# Patient Record
Sex: MALE | Race: WHITE | NOT HISPANIC OR LATINO | Employment: UNEMPLOYED | URBAN - METROPOLITAN AREA
[De-identification: names, ages, dates, MRNs, and addresses within clinical notes are randomized per-mention and may not be internally consistent; named-entity substitution may affect disease eponyms.]

---

## 2019-08-26 PROBLEM — F41.1 GENERALIZED ANXIETY DISORDER: Status: ACTIVE | Noted: 2019-03-19

## 2019-08-26 PROBLEM — F33.2 SEVERE EPISODE OF RECURRENT MAJOR DEPRESSIVE DISORDER, WITHOUT PSYCHOTIC FEATURES: Status: ACTIVE | Noted: 2019-03-19

## 2020-04-03 PROBLEM — F33.0 MILD EPISODE OF RECURRENT MAJOR DEPRESSIVE DISORDER: Status: ACTIVE | Noted: 2020-04-03

## 2022-03-04 PROBLEM — F43.10 POSTTRAUMATIC STRESS DISORDER: Status: ACTIVE | Noted: 2019-03-19

## 2022-03-04 PROBLEM — F32.9 MAJOR DEPRESSIVE DISORDER WITH SINGLE EPISODE: Status: ACTIVE | Noted: 2022-03-04

## 2022-03-04 PROBLEM — F31.9 BIPOLAR DISORDER: Status: ACTIVE | Noted: 2022-03-04

## 2022-03-04 PROBLEM — E66.01 MORBID OBESITY: Status: ACTIVE | Noted: 2022-03-04

## 2022-04-10 ENCOUNTER — HISTORICAL (OUTPATIENT)
Dept: ADMINISTRATIVE | Facility: HOSPITAL | Age: 32
End: 2022-04-10

## 2022-04-27 VITALS
BODY MASS INDEX: 44.1 KG/M2 | WEIGHT: 315 LBS | DIASTOLIC BLOOD PRESSURE: 84 MMHG | SYSTOLIC BLOOD PRESSURE: 169 MMHG | HEIGHT: 71 IN

## 2024-06-03 ENCOUNTER — TELEPHONE (OUTPATIENT)
Dept: FAMILY MEDICINE | Facility: CLINIC | Age: 34
End: 2024-06-03

## 2024-09-27 ENCOUNTER — HOSPITAL ENCOUNTER (INPATIENT)
Facility: HOSPITAL | Age: 34
LOS: 1 days | Discharge: HOME | DRG: 101 | End: 2024-09-28
Attending: EMERGENCY MEDICINE | Admitting: INTERNAL MEDICINE
Payer: MEDICAID

## 2024-09-27 DIAGNOSIS — F31.9 BIPOLAR AFFECTIVE DISORDER, REMISSION STATUS UNSPECIFIED: ICD-10-CM

## 2024-09-27 DIAGNOSIS — R07.9 CHEST PAIN: ICD-10-CM

## 2024-09-27 DIAGNOSIS — F41.9 ANXIETY: ICD-10-CM

## 2024-09-27 DIAGNOSIS — G47.30 SLEEP APNEA, UNSPECIFIED TYPE: ICD-10-CM

## 2024-09-27 DIAGNOSIS — R56.9 NEW ONSET SEIZURE: ICD-10-CM

## 2024-09-27 DIAGNOSIS — G96.89 CNS MASS: Primary | ICD-10-CM

## 2024-09-27 DIAGNOSIS — E66.9 OBESITY, UNSPECIFIED CLASSIFICATION, UNSPECIFIED OBESITY TYPE, UNSPECIFIED WHETHER SERIOUS COMORBIDITY PRESENT: ICD-10-CM

## 2024-09-27 LAB
ABORH RETYPE: NORMAL
ALBUMIN SERPL-MCNC: 3.9 G/DL (ref 3.5–5)
ALBUMIN/GLOB SERPL: 1.2 RATIO (ref 1.1–2)
ALP SERPL-CCNC: 65 UNIT/L (ref 40–150)
ALT SERPL-CCNC: 58 UNIT/L (ref 0–55)
ANION GAP SERPL CALC-SCNC: 8 MEQ/L
APTT PPP: 28.2 SECONDS (ref 23.2–33.7)
AST SERPL-CCNC: 39 UNIT/L (ref 5–34)
BASOPHILS # BLD AUTO: 0.05 X10(3)/MCL
BASOPHILS NFR BLD AUTO: 0.6 %
BILIRUB SERPL-MCNC: 0.4 MG/DL
BUN SERPL-MCNC: 13.1 MG/DL (ref 8.9–20.6)
CALCIUM SERPL-MCNC: 9.1 MG/DL (ref 8.4–10.2)
CHLORIDE SERPL-SCNC: 102 MMOL/L (ref 98–107)
CO2 SERPL-SCNC: 26 MMOL/L (ref 22–29)
CREAT SERPL-MCNC: 1.12 MG/DL (ref 0.73–1.18)
CREAT/UREA NIT SERPL: 12
EOSINOPHIL # BLD AUTO: 0.03 X10(3)/MCL (ref 0–0.9)
EOSINOPHIL NFR BLD AUTO: 0.3 %
ERYTHROCYTE [DISTWIDTH] IN BLOOD BY AUTOMATED COUNT: 12.5 % (ref 11.5–17)
GFR SERPLBLD CREATININE-BSD FMLA CKD-EPI: >60 ML/MIN/1.73/M2
GLOBULIN SER-MCNC: 3.3 GM/DL (ref 2.4–3.5)
GLUCOSE SERPL-MCNC: 164 MG/DL (ref 74–100)
GROUP & RH: NORMAL
HCT VFR BLD AUTO: 48.3 % (ref 42–52)
HGB BLD-MCNC: 16.4 G/DL (ref 14–18)
IMM GRANULOCYTES # BLD AUTO: 0.03 X10(3)/MCL (ref 0–0.04)
IMM GRANULOCYTES NFR BLD AUTO: 0.3 %
INDIRECT COOMBS: NORMAL
INR PPP: 1
LYMPHOCYTES # BLD AUTO: 0.97 X10(3)/MCL (ref 0.6–4.6)
LYMPHOCYTES NFR BLD AUTO: 11.3 %
MAGNESIUM SERPL-MCNC: 2.1 MG/DL (ref 1.6–2.6)
MCH RBC QN AUTO: 32.6 PG (ref 27–31)
MCHC RBC AUTO-ENTMCNC: 34 G/DL (ref 33–36)
MCV RBC AUTO: 96 FL (ref 80–94)
MONOCYTES # BLD AUTO: 0.19 X10(3)/MCL (ref 0.1–1.3)
MONOCYTES NFR BLD AUTO: 2.2 %
NEUTROPHILS # BLD AUTO: 7.32 X10(3)/MCL (ref 2.1–9.2)
NEUTROPHILS NFR BLD AUTO: 85.3 %
NRBC BLD AUTO-RTO: 0 %
PHOSPHATE SERPL-MCNC: 3.4 MG/DL (ref 2.3–4.7)
PLATELET # BLD AUTO: 207 X10(3)/MCL (ref 130–400)
PMV BLD AUTO: 9.1 FL (ref 7.4–10.4)
POTASSIUM SERPL-SCNC: 4.6 MMOL/L (ref 3.5–5.1)
PROT SERPL-MCNC: 7.2 GM/DL (ref 6.4–8.3)
PROTHROMBIN TIME: 12.9 SECONDS (ref 12.5–14.5)
RBC # BLD AUTO: 5.03 X10(6)/MCL (ref 4.7–6.1)
SODIUM SERPL-SCNC: 136 MMOL/L (ref 136–145)
SPECIMEN OUTDATE: NORMAL
WBC # BLD AUTO: 8.59 X10(3)/MCL (ref 4.5–11.5)

## 2024-09-27 PROCEDURE — 99223 1ST HOSP IP/OBS HIGH 75: CPT | Mod: FS,,, | Performed by: NEUROLOGICAL SURGERY

## 2024-09-27 PROCEDURE — 63600175 PHARM REV CODE 636 W HCPCS: Performed by: NURSE PRACTITIONER

## 2024-09-27 PROCEDURE — 86900 BLOOD TYPING SEROLOGIC ABO: CPT | Performed by: EMERGENCY MEDICINE

## 2024-09-27 PROCEDURE — 27000221 HC OXYGEN, UP TO 24 HOURS

## 2024-09-27 PROCEDURE — A9577 INJ MULTIHANCE: HCPCS | Performed by: INTERNAL MEDICINE

## 2024-09-27 PROCEDURE — 86850 RBC ANTIBODY SCREEN: CPT | Performed by: EMERGENCY MEDICINE

## 2024-09-27 PROCEDURE — 99285 EMERGENCY DEPT VISIT HI MDM: CPT | Mod: 25

## 2024-09-27 PROCEDURE — 85025 COMPLETE CBC W/AUTO DIFF WBC: CPT | Performed by: EMERGENCY MEDICINE

## 2024-09-27 PROCEDURE — 25500020 PHARM REV CODE 255: Performed by: INTERNAL MEDICINE

## 2024-09-27 PROCEDURE — 85730 THROMBOPLASTIN TIME PARTIAL: CPT | Performed by: EMERGENCY MEDICINE

## 2024-09-27 PROCEDURE — 80053 COMPREHEN METABOLIC PANEL: CPT | Performed by: EMERGENCY MEDICINE

## 2024-09-27 PROCEDURE — 84100 ASSAY OF PHOSPHORUS: CPT | Performed by: NURSE PRACTITIONER

## 2024-09-27 PROCEDURE — 25000003 PHARM REV CODE 250: Performed by: INTERNAL MEDICINE

## 2024-09-27 PROCEDURE — 85610 PROTHROMBIN TIME: CPT | Performed by: EMERGENCY MEDICINE

## 2024-09-27 PROCEDURE — 83735 ASSAY OF MAGNESIUM: CPT | Performed by: EMERGENCY MEDICINE

## 2024-09-27 PROCEDURE — 86901 BLOOD TYPING SEROLOGIC RH(D): CPT | Performed by: EMERGENCY MEDICINE

## 2024-09-27 PROCEDURE — 11000001 HC ACUTE MED/SURG PRIVATE ROOM

## 2024-09-27 RX ORDER — LABETALOL HYDROCHLORIDE 5 MG/ML
10 INJECTION, SOLUTION INTRAVENOUS EVERY 4 HOURS PRN
Status: DISCONTINUED | OUTPATIENT
Start: 2024-09-27 | End: 2024-09-28 | Stop reason: HOSPADM

## 2024-09-27 RX ORDER — HYDRALAZINE HYDROCHLORIDE 20 MG/ML
10 INJECTION INTRAMUSCULAR; INTRAVENOUS EVERY 4 HOURS PRN
Status: DISCONTINUED | OUTPATIENT
Start: 2024-09-27 | End: 2024-09-28 | Stop reason: HOSPADM

## 2024-09-27 RX ORDER — LISINOPRIL 20 MG/1
20 TABLET ORAL DAILY
Status: DISCONTINUED | OUTPATIENT
Start: 2024-09-28 | End: 2024-09-28 | Stop reason: HOSPADM

## 2024-09-27 RX ORDER — DULOXETIN HYDROCHLORIDE 60 MG/1
60 CAPSULE, DELAYED RELEASE ORAL DAILY
COMMUNITY

## 2024-09-27 RX ORDER — ACETAMINOPHEN 325 MG/1
650 TABLET ORAL EVERY 6 HOURS PRN
Status: DISCONTINUED | OUTPATIENT
Start: 2024-09-27 | End: 2024-09-28 | Stop reason: HOSPADM

## 2024-09-27 RX ORDER — POLYETHYLENE GLYCOL 3350 17 G/17G
17 POWDER, FOR SOLUTION ORAL 2 TIMES DAILY PRN
Status: DISCONTINUED | OUTPATIENT
Start: 2024-09-27 | End: 2024-09-28 | Stop reason: HOSPADM

## 2024-09-27 RX ORDER — HYDROCHLOROTHIAZIDE 12.5 MG/1
12.5 TABLET ORAL DAILY
Status: DISCONTINUED | OUTPATIENT
Start: 2024-09-28 | End: 2024-09-28 | Stop reason: HOSPADM

## 2024-09-27 RX ORDER — DEXAMETHASONE SODIUM PHOSPHATE 4 MG/ML
6 INJECTION, SOLUTION INTRA-ARTICULAR; INTRALESIONAL; INTRAMUSCULAR; INTRAVENOUS; SOFT TISSUE
Status: DISCONTINUED | OUTPATIENT
Start: 2024-09-27 | End: 2024-09-27

## 2024-09-27 RX ORDER — DULOXETIN HYDROCHLORIDE 30 MG/1
60 CAPSULE, DELAYED RELEASE ORAL DAILY
Status: DISCONTINUED | OUTPATIENT
Start: 2024-09-28 | End: 2024-09-28 | Stop reason: HOSPADM

## 2024-09-27 RX ORDER — FAMOTIDINE 20 MG/1
20 TABLET, FILM COATED ORAL 2 TIMES DAILY
Status: DISCONTINUED | OUTPATIENT
Start: 2024-09-27 | End: 2024-09-28 | Stop reason: HOSPADM

## 2024-09-27 RX ORDER — LEVETIRACETAM 15 MG/ML
1500 INJECTION INTRAVASCULAR
Status: DISCONTINUED | OUTPATIENT
Start: 2024-09-27 | End: 2024-09-27

## 2024-09-27 RX ORDER — ONDANSETRON HYDROCHLORIDE 2 MG/ML
4 INJECTION, SOLUTION INTRAVENOUS EVERY 4 HOURS PRN
Status: DISCONTINUED | OUTPATIENT
Start: 2024-09-27 | End: 2024-09-28 | Stop reason: HOSPADM

## 2024-09-27 RX ORDER — PROCHLORPERAZINE EDISYLATE 5 MG/ML
5 INJECTION INTRAMUSCULAR; INTRAVENOUS EVERY 6 HOURS PRN
Status: DISCONTINUED | OUTPATIENT
Start: 2024-09-27 | End: 2024-09-28 | Stop reason: HOSPADM

## 2024-09-27 RX ORDER — SIMETHICONE 80 MG
1 TABLET,CHEWABLE ORAL 4 TIMES DAILY PRN
Status: DISCONTINUED | OUTPATIENT
Start: 2024-09-27 | End: 2024-09-28 | Stop reason: HOSPADM

## 2024-09-27 RX ORDER — CLONAZEPAM 1 MG/1
1 TABLET ORAL 2 TIMES DAILY PRN
COMMUNITY

## 2024-09-27 RX ORDER — LURASIDONE HYDROCHLORIDE 40 MG/1
80 TABLET, FILM COATED ORAL DAILY
Status: DISCONTINUED | OUTPATIENT
Start: 2024-09-28 | End: 2024-09-28 | Stop reason: HOSPADM

## 2024-09-27 RX ORDER — MUPIROCIN 20 MG/G
OINTMENT TOPICAL 2 TIMES DAILY
Status: DISCONTINUED | OUTPATIENT
Start: 2024-09-29 | End: 2024-09-28 | Stop reason: HOSPADM

## 2024-09-27 RX ORDER — DEXAMETHASONE 4 MG/1
4 TABLET ORAL EVERY 8 HOURS
Status: DISCONTINUED | OUTPATIENT
Start: 2024-09-27 | End: 2024-09-28 | Stop reason: HOSPADM

## 2024-09-27 RX ORDER — TRAZODONE HYDROCHLORIDE 100 MG/1
100 TABLET ORAL NIGHTLY
Status: DISCONTINUED | OUTPATIENT
Start: 2024-09-27 | End: 2024-09-28 | Stop reason: HOSPADM

## 2024-09-27 RX ORDER — ALUMINUM HYDROXIDE, MAGNESIUM HYDROXIDE, AND SIMETHICONE 1200; 120; 1200 MG/30ML; MG/30ML; MG/30ML
30 SUSPENSION ORAL 4 TIMES DAILY PRN
Status: DISCONTINUED | OUTPATIENT
Start: 2024-09-27 | End: 2024-09-28 | Stop reason: HOSPADM

## 2024-09-27 RX ORDER — TALC
6 POWDER (GRAM) TOPICAL NIGHTLY PRN
Status: DISCONTINUED | OUTPATIENT
Start: 2024-09-27 | End: 2024-09-28 | Stop reason: HOSPADM

## 2024-09-27 RX ORDER — HYDROXYZINE PAMOATE 50 MG/1
50 CAPSULE ORAL EVERY 8 HOURS PRN
COMMUNITY

## 2024-09-27 RX ORDER — LEVETIRACETAM 500 MG/1
1500 TABLET ORAL 2 TIMES DAILY
Status: DISCONTINUED | OUTPATIENT
Start: 2024-09-27 | End: 2024-09-28 | Stop reason: HOSPADM

## 2024-09-27 RX ORDER — TRAZODONE HYDROCHLORIDE 100 MG/1
100 TABLET ORAL NIGHTLY
COMMUNITY

## 2024-09-27 RX ORDER — FAMOTIDINE 20 MG/1
20 TABLET, FILM COATED ORAL 2 TIMES DAILY
Status: ON HOLD | COMMUNITY
End: 2024-09-28 | Stop reason: HOSPADM

## 2024-09-27 RX ORDER — CLONAZEPAM 1 MG/1
1 TABLET ORAL 2 TIMES DAILY PRN
Status: DISCONTINUED | OUTPATIENT
Start: 2024-09-27 | End: 2024-09-28 | Stop reason: HOSPADM

## 2024-09-27 RX ORDER — LEVETIRACETAM 750 MG/1
1500 TABLET ORAL 2 TIMES DAILY
Qty: 28 TABLET | Refills: 0 | Status: SHIPPED | OUTPATIENT
Start: 2024-09-27 | End: 2024-09-27 | Stop reason: HOSPADM

## 2024-09-27 RX ORDER — BUSPIRONE HYDROCHLORIDE 5 MG/1
15 TABLET ORAL 3 TIMES DAILY
Status: DISCONTINUED | OUTPATIENT
Start: 2024-09-27 | End: 2024-09-28 | Stop reason: HOSPADM

## 2024-09-27 RX ORDER — DEXAMETHASONE 4 MG/1
4 TABLET ORAL EVERY 8 HOURS
Qty: 21 TABLET | Refills: 0 | Status: SHIPPED | OUTPATIENT
Start: 2024-09-27 | End: 2024-09-27 | Stop reason: HOSPADM

## 2024-09-27 RX ORDER — LISINOPRIL AND HYDROCHLOROTHIAZIDE 12.5; 2 MG/1; MG/1
1 TABLET ORAL DAILY
COMMUNITY

## 2024-09-27 RX ORDER — BUSPIRONE HYDROCHLORIDE 15 MG/1
15 TABLET ORAL 3 TIMES DAILY
COMMUNITY

## 2024-09-27 RX ORDER — LEVETIRACETAM 500 MG/1
1500 TABLET ORAL 2 TIMES DAILY
Status: DISCONTINUED | OUTPATIENT
Start: 2024-09-27 | End: 2024-09-27

## 2024-09-27 RX ORDER — NALOXONE HCL 0.4 MG/ML
0.02 VIAL (ML) INJECTION
Status: DISCONTINUED | OUTPATIENT
Start: 2024-09-27 | End: 2024-09-28 | Stop reason: HOSPADM

## 2024-09-27 RX ORDER — LURASIDONE HYDROCHLORIDE 40 MG/1
80 TABLET, FILM COATED ORAL DAILY
COMMUNITY

## 2024-09-27 RX ADMIN — IOHEXOL 100 ML: 350 INJECTION, SOLUTION INTRAVENOUS at 10:09

## 2024-09-27 RX ADMIN — DEXAMETHASONE SODIUM PHOSPHATE 6 MG: 4 INJECTION, SOLUTION INTRA-ARTICULAR; INTRALESIONAL; INTRAMUSCULAR; INTRAVENOUS; SOFT TISSUE at 09:09

## 2024-09-27 RX ADMIN — GADOBENATE DIMEGLUMINE 20 ML: 529 INJECTION, SOLUTION INTRAVENOUS at 06:09

## 2024-09-27 RX ADMIN — FAMOTIDINE 20 MG: 20 TABLET, FILM COATED ORAL at 09:09

## 2024-09-27 RX ADMIN — DEXAMETHASONE SODIUM PHOSPHATE 6 MG: 4 INJECTION, SOLUTION INTRA-ARTICULAR; INTRALESIONAL; INTRAMUSCULAR; INTRAVENOUS; SOFT TISSUE at 04:09

## 2024-09-27 RX ADMIN — LEVETIRACETAM INJECTION 1500 MG: 15 INJECTION INTRAVENOUS at 04:09

## 2024-09-27 RX ADMIN — BUSPIRONE HYDROCHLORIDE 15 MG: 5 TABLET ORAL at 09:09

## 2024-09-27 RX ADMIN — DEXAMETHASONE SODIUM PHOSPHATE 6 MG: 4 INJECTION, SOLUTION INTRA-ARTICULAR; INTRALESIONAL; INTRAMUSCULAR; INTRAVENOUS; SOFT TISSUE at 12:09

## 2024-09-27 NOTE — ED PROVIDER NOTES
Encounter Date: 9/26/2024       History     Chief Complaint   Patient presents with    Transfer     Transfer from [facility] for new onset seizure with brain mass     34 M transferred from [facility] for neurosurgical services.  Patient had new onset seizure activity today and CT scan showed large temporal brain mass.    He has history of bipolar disorder, depression, anxiety, sleep apnea (not compliant with cpap), obesity, hypertension.  States he was never had seizure activity before today around lunchtime he was sitting in his chair with his parents and he was told that he suddenly went stiff became unresponsive then vomited and woke up.  He reports that over the last 1-2 weeks he has been experiencing headaches only whenever she coughs but he states when he coughs he develops a severe splitting headache like his head is about a bust.  States he gets nauseated during those spells.  They last 1-2 minutes and then resolved.  States it was balance has been a little bit off when he walks for last 1-2 days Denies any vision changes denies headache currently no nausea or vomiting currently.  He denies any numbness or weakness in his extremities.  He states it was only previous CT of his brain was when he was a child of approximately 9 years of age because he was having some intermittent numbness weakness in his leg but he reports the CT of the head was normal at that time he was far as he knows            Review of patient's allergies indicates:   Allergen Reactions    Tree nut      Past Medical History:   Diagnosis Date    Bipolar disorder 3/4/2022    Depression     Posttraumatic stress disorder 3/19/2019     No past surgical history on file.  Family History   Problem Relation Name Age of Onset    Mental illness Mother      Mental illness Father      Heart disease Father       Social History     Tobacco Use    Smoking status: Never    Smokeless tobacco: Never     Review of Systems   Constitutional:  Negative for  chills and fever.   Respiratory:  Positive for cough. Negative for shortness of breath.    Cardiovascular:  Negative for chest pain.   Gastrointestinal:  Positive for nausea. Negative for abdominal pain.   Musculoskeletal:  Negative for myalgias.   Neurological:  Positive for headaches. Negative for weakness and numbness.   All other systems reviewed and are negative.      Physical Exam     Initial Vitals [09/27/24 0148]   BP Pulse Resp Temp SpO2   (!) 167/106 80 16 98.6 °F (37 °C) (!) 94 %      MAP       --         Physical Exam    Nursing note and vitals reviewed.  Constitutional: He appears well-developed and well-nourished. No distress.   HENT:   Head: Normocephalic and atraumatic.   Eyes: Conjunctivae and EOM are normal. Pupils are equal, round, and reactive to light.   Cardiovascular:  Normal rate.           Pulmonary/Chest: No respiratory distress. He has no wheezes. He has no rhonchi.   Abdominal: Abdomen is soft. There is no abdominal tenderness. There is no rebound and no guarding.   Musculoskeletal:         General: Normal range of motion.     Neurological: He is alert and oriented to person, place, and time. He has normal strength. No cranial nerve deficit or sensory deficit. GCS score is 15. GCS eye subscore is 4. GCS verbal subscore is 5. GCS motor subscore is 6.   Slight finger-to-nose ataxia bilaterally  Nl strength deltoids, 5/5 f/e elbows, wrists, nl hand . Nl light touch sensation in radial, median and ulner distribution  No leg drift.  Normal flexion-extension of the ankle is normal light touch sensation bilateral lower extremities   Skin: Skin is warm and dry.   Psychiatric: He has a normal mood and affect.         ED Course   Procedures  Labs Reviewed   COMPREHENSIVE METABOLIC PANEL - Abnormal       Result Value    Sodium 136      Potassium 4.6      Chloride 102      CO2 26      Glucose 164 (*)     Blood Urea Nitrogen 13.1      Creatinine 1.12      Calcium 9.1      Protein Total 7.2       Albumin 3.9      Globulin 3.3      Albumin/Globulin Ratio 1.2      Bilirubin Total 0.4      ALP 65      ALT 58 (*)     AST 39 (*)     eGFR >60      Anion Gap 8.0      BUN/Creatinine Ratio 12     CBC WITH DIFFERENTIAL - Abnormal    WBC 8.59      RBC 5.03      Hgb 16.4      Hct 48.3      MCV 96.0 (*)     MCH 32.6 (*)     MCHC 34.0      RDW 12.5      Platelet 207      MPV 9.1      Neut % 85.3      Lymph % 11.3      Mono % 2.2      Eos % 0.3      Basophil % 0.6      Lymph # 0.97      Neut # 7.32      Mono # 0.19      Eos # 0.03      Baso # 0.05      IG# 0.03      IG% 0.3      NRBC% 0.0     APTT - Normal    PTT 28.2     PROTIME-INR - Normal    PT 12.9      INR 1.0     MAGNESIUM - Normal    Magnesium Level 2.10     CBC W/ AUTO DIFFERENTIAL    Narrative:     The following orders were created for panel order CBC auto differential.  Procedure                               Abnormality         Status                     ---------                               -----------         ------                     CBC with Differential[[id number]]       Abnormal            Final result                 Please view results for these tests on the individual orders.   TYPE & SCREEN    Group & Rh A POS      Indirect Norma GEL NEG      Specimen Outdate 09/30/2024 23:59     ABORH RETYPE    ABORH Retype A POS            Imaging Results    None          Medications - No data to display  Medical Decision Making  Amount and/or Complexity of Data Reviewed  Labs: ordered.    Risk  Decision regarding hospitalization.               ED Course as of 09/27/24 0344   Fri Sep 27, 2024   0203 Prior to transfer patient received 1500 mg of Keppra, 12 mg of IV Decadron at 2108 hours lactated Ringer's [LF]   0340 I discussed the case with Dr. Olivo.  Recommends continuing Decadron 6 mg q.8 hours continue Keppra 1500 b.i.d..  He will order the MRIs with stealth protocol in the morning.  Okay to admit to the floor.  I discussed case with Mercedes of our  hospitalist service [facility] place orders with the Decadron and Keppra starting at 0500 hours (previous doses 21:00).  Hospitalist will admit [LF]      ED Course User Index  [LF] Jayme Aragon MD                           Clinical Impression:  Final diagnoses:  [G96.89] CNS mass (Primary)  [R56.9] New onset seizure  [E66.9] Obesity, unspecified classification, unspecified obesity type, unspecified whether serious comorbidity present  [G47.30] Sleep apnea, unspecified type  [F31.9] Bipolar affective disorder, remission status unspecified  [F41.9] Anxiety          ED Disposition Condition    Admit Stable                Jayme Aragon MD  09/27/24 0342       Jayme Aragon MD  09/27/24 0344

## 2024-09-27 NOTE — PLAN OF CARE
Cm met with pt for initial discharge assessment. Pt is single with three children. Pt has no POA or Living Will. Pt had no current identified needs.    09/27/24 0918   Discharge Assessment   Assessment Type Discharge Planning Assessment   Confirmed/corrected address, phone number and insurance Yes   Confirmed Demographics Correct on Facesheet   Source of Information patient   When was your last doctors appointment?   (Dr. René Huertas,[place] and Cristina Travis, Psych)   Communicated SELVIN with patient/caregiver Date not available/Unable to determine   People in Home parent(s)   Do you expect to return to your current living situation? Yes   Do you have help at home or someone to help you manage your care at home? Yes   Who are your caregiver(s) and their phone number(s)? Chrissie Gomes, Mother at [phone]   Prior to hospitilization cognitive status: Unable to Assess   Current cognitive status: Alert/Oriented   Walking or Climbing Stairs Difficulty no   Dressing/Bathing Difficulty no   Home Layout Able to live on 1st floor   Equipment Currently Used at Home none   Readmission within 30 days? No   Patient currently being followed by outpatient case management? No   Do you currently have service(s) that help you manage your care at home? No   Do you take prescription medications? Yes   Do you have prescription coverage? Yes   Coverage Medicaid   Do you have any problems affording any of your prescribed medications? No   Is the patient taking medications as prescribed? yes   Who is going to help you get home at discharge? pending   How do you get to doctors appointments? family or friend will provide   Are you on dialysis? No   Do you take coumadin? No   Discharge Plan A   (TBD)   DME Needed Upon Discharge    (TBD)   Transition of Care Barriers   (no barriers at this time)   Physical Activity   On average, how many days per week do you engage in moderate to strenuous exercise (like a brisk walk)? 0 days   On average,  how many minutes do you engage in exercise at this level? 0 min   Financial Resource Strain   How hard is it for you to pay for the very basics like food, housing, medical care, and heating? Not hard   Housing Stability   In the last 12 months, was there a time when you were not able to pay the mortgage or rent on time? N   At any time in the past 12 months, were you homeless or living in a shelter (including now)? N   Transportation Needs   Has the lack of transportation kept you from medical appointments, meetings, work or from getting things needed for daily living? No   Food Insecurity   Within the past 12 months, you worried that your food would run out before you got the money to buy more. Never true   Within the past 12 months, the food you bought just didn't last and you didn't have money to get more. Never true   Stress   Do you feel stress - tense, restless, nervous, or anxious, or unable to sleep at night because your mind is troubled all the time - these days? To some exte  (No resources needed per pt.)   Social Isolation   How often do you feel lonely or isolated from those around you?  Rarely   Alcohol Use   Q1: How often do you have a drink containing alcohol? 2-3 per wk   Q2: How many drinks containing alcohol do you have on a typical day when you are drinking? 1 or 2   Q3: How often do you have six or more drinks on one occasion? Never   Utilities   In the past 12 months has the electric, gas, oil, or water company threatened to shut off services in your home? No   Health Literacy   How often do you need to have someone help you when you read instructions, pamphlets, or other written material from your doctor or pharmacy? Never   OTHER   Name(s) of People in Home Chrissie Gomes

## 2024-09-27 NOTE — NURSING
Nurses Note -- 4 Eyes      9/27/2024   12:54 PM      Skin assessed during: Admit      [] No Altered Skin Integrity Present    []Prevention Measures Documented      [x] Yes- Altered Skin Integrity Present or Discovered   [] LDA Added if Not in Epic (Describe Wound)   [x] New Altered Skin Integrity was Present on Admit and Documented in LDA   [x] Wound Image Taken    Wound Care Consulted? yes      Attending Nurse:  Helga Ricci RN/Staff Member:   VIDYA Serrano

## 2024-09-27 NOTE — CONSULTS
[facility] - Emergency Dept  Neurosurgery  Consult Note    Inpatient consult to Neurosurgery  Consult performed by: Beatriz Feldman, AGACNP-BC  Consult ordered by: Jayme Aragon MD      Subjective:     Chief Complaint/Reason for Admission:  New onset seizure with brain mass    History of Present Illness:  This is a 34-year-old male transferred from [facility] for Neurological Services patient with new onset seizure activity today and CT demonstrated large temporal brain mass.  Past medical history significant for the following: Bipolar disorder, depression, anxiety, sleep apnea not compliant with CPAP, obesity, hypertension.  Patient has never experienced a seizure until yesterday at lunchtime.  Details surrounding seizure are as follows.  Patient was sitting in a chair with his parents and he was told that he suddenly went stiff and became unresponsive then vomited and then woke up.  Over the last 1-2 weeks he has been experiencing headaches when he coughs.  States he develops splitting headaches and states he feels like his head is about to bust.  Also associated with nausea during the spells.  Also with reports of balance being a little off.  Denies visual changes.    CT head without contrast:  Large heterogeneous mass centered in the right temporal lobe most compatible with primary brain neoplasm with marked associated mass effect as described including right on-call herniation and significant mass effect on the ventricular system.    An MRI with and without contrast of the brain has been ordered as well as Omni stealth protocol.    CT chest abdomen pelvis with and without contrast-with no convincing evidence of malignancy.       On physical exam:  Mr. Gomes is sitting up in the chair with his mother at the bedside.  Patient is awake and oriented and follows commands.  He has no weakness or pronator drift at this time.  His only complaint at this time is slight headache and some  mouth tingling.  He states that he also has this mouth tingling sensation when he smokes marijuana.  Patient's mother states that he has had some cognitive and mental decline over the last few months.  He understands the CT head findings and understands the plan for MRI, steroids, antiseizure medication.      (Not in a hospital admission)      Review of patient's allergies indicates:   Allergen Reactions    Tree nut        Past Medical History:   Diagnosis Date    Bipolar disorder 3/4/2022    Depression     Posttraumatic stress disorder 3/19/2019     No past surgical history on file.  Family History       Problem Relation (Age of Onset)    Heart disease Father    Mental illness Mother, Father          Tobacco Use    Smoking status: Never    Smokeless tobacco: Never   Substance and Sexual Activity    Alcohol use: Not on file    Drug use: Not on file    Sexual activity: Not on file     Review of Systems   Neurological:  Positive for seizures and headaches.        Oral paresthesias  Reports some cognitive impairment over the past few months     Objective:     Weight: (!) 145 kg (319 lb 10.7 oz)  Body mass index is 44.58 kg/m².  Vital Signs (Most Recent):  Temp: 98.6 °F (37 °C) (09/27/24 0148)  Pulse: 104 (09/27/24 0848)  Resp: 12 (09/27/24 0848)  BP: 133/87 (09/27/24 0848)  SpO2: (!) 94 % (09/27/24 0848) Vital Signs (24h Range):  Temp:  [98.6 °F (37 °C)] 98.6 °F (37 °C)  Pulse:  [] 104  Resp:  [12-18] 12  SpO2:  [93 %-97 %] 94 %  BP: (133-174)/() 133/87                              Physical Exam:  Nursing note and vitals reviewed.    Constitutional: He appears well-developed. He is not diaphoretic. No distress.   Robust, overweight     Eyes: Pupils are equal, round, and reactive to light. Conjunctivae and EOM are normal.     Cardiovascular: Normal rate.     Abdominal: Soft. Bowel sounds are normal.     Skin: Skin displays no rash on trunk. Skin displays no lesions on trunk.     Psych/Behavior: He is alert.  He is oriented to person, place, and time. He has a normal mood and affect.     Musculoskeletal:        Right Upper Extremities: Muscle strength is 5/5.        Left Upper Extremities: Muscle strength is 5/5.       Right Lower Extremities: Muscle strength is 5/5.        Left Lower Extremities: Muscle strength is 5/5.     Neurological:        Sensory: There is no sensory deficit in the trunk. There is no sensory deficit in the extremities.        DTRs: He displays no Babinski's sign on the right side. He displays no Babinski's sign on the left side.        Cranial nerves: Cranial nerve(s) II, III, IV, V, VI, VII, VIII, IX, X, XI and XII are intact.   GCS 15   Oriented to all spheres  PERRLA bilateral brisk  Fully oriented to all spheres.    Follows commands   No facial droop, no speech issues.      Moves all extremities with no lateralizing weakness.  Sensation intact throughout.    No gross visual issues.    Cranial nerves grossly intact   No pronator drift     Mouth tingling   Mild headache         Significant Labs:  Recent Labs   Lab 09/27/24 0218      K 4.6      CO2 26   BUN 13.1   CREATININE 1.12   CALCIUM 9.1   MG 2.10     Recent Labs   Lab 09/27/24 0218   WBC 8.59   HGB 16.4   HCT 48.3        Recent Labs   Lab 09/27/24 0218   INR 1.0   APTT 28.2     Microbiology Results (last 7 days)       ** No results found for the last 168 hours. **        PT INR 12.9 and 1.0.  PTT 28.2.  Platelets 207   Current sodium 136         Assessment/Plan:    Acute new onset seizures  Acute large homogeneous right temporal lobe mass suspicious for neoplasm  Headaches  Balance issues  Pressure in head while coughing  Reported cognitive decline over the last few months by mother  Oral paresthesias    Comorbidities include the following:  JANES noncompliant with CPAP, anxiety, depression, bipolar disorder, morbidly obese.  Patient smokes marijuana.  He also binge drinks      MRI of the brain with and without  contrast with Omni stealth protocol as well.    Seizure precautions Keppra   Decadron was initiated   Okay for floor admission with every 4 hour neurological exams   Fall precautions   SCDs   Hospitalist following-consider CIWA protocol      Chest CT already obtained   EKG ordered  Coags performed and are within normal limits  Type and cross has been performed         There are no hospital problems to display for this patient.      Thank you for your consult. I will follow-up with patient. Please contact us if you have any additional questions.    JAS StaplesNIKHIL-BC  Neurosurgery  [facility] - Emergency Dept

## 2024-09-27 NOTE — H&P
[facility] Hospital Medicine History & Physical Examination       Patient Name: Luis Gomes  MRN: [id number]  Patient Class: IP- Inpatient   Admission Date: 9/27/2024   Admitting Physician: [facility]   Length of Stay: 0  Attending Physician: Dr Miguel Smith  Primary Care Provider: Zarina Melvin FNP-C  Face-to-Face encounter date: 09/27/2024  Code Status: Full code  Chief Complaint: Transfer (Transfer from [facility] for new onset seizure with brain mass)        Screening for Social Drivers for health:  Patient screened for food insecurity, housing instability, transportation needs, utility difficulties, and interpersonal safety (select all that apply as identified as concern)  []Housing or Food  []Transportation Needs  []Utility Difficulties  []Interpersonal safety  [x]None    Patient information was obtained from patient, patient's family, past medical records and/or ER records.     HISTORY OF PRESENT ILLNESS:   Luis Gomes is a 34 y.o. male who PMH includes bipolar disorder, anxiety, depression, PTSD, JANES non complaint with CPAP; presented to the ED at [facility] on 09/26/2024  with new onset seizure activity, and altered mental status.  Patient reports no prior history of seizure activity.  Workup at [facility] CT head without contrast demonstrated large homogeneous mass center in the right temporal lobe.  No neurosurgery Services available at [facility].  Patient was transferred to [facility] on 9/27/2024 for neurosurgery Services. Patient reports he was [facility] had [facility]  Reports he was eating lunch sitting in his chair with his parents and he was told that he suddenly went stiff became unresponsive then vomited and woke up.  He reports that over the last 1-2 weeks he has been experiencing headaches with cough; He reports when he coughs he develops a severe splitting headache like his head is about a  magaly.  States he gets nauseated during those spells.  They last 1-2 minutes and then resolved.  Patient does endorse feeling off balance over the past 2 days while ambulating.  He denies any injury trauma or falls. Denies any vision changes denies headache currently no nausea or vomiting currently.  He denies any numbness or weakness in his extremities.  He states it was only previous CT of his brain was when he was a child of approximately 9 years of age because he was having some intermittent numbness weakness in his leg but he reports the CT of the head was normal at that time he was far as he knows.  He denies any prior episode of seizures.  No reported chest pain, fever, chills, or any sick contacts.  Lab work reviewed demonstrated AST 39, ALT 58; glucose 164, other indices unremarkable.  Initial vital signs /106 pulse 80 respirations 16 temperature 98.6° F O2 saturation 94% on room air.  Patient's blood pressure subsequently trended down, O2 saturation improved.  Neurosurgery Services were consulted and ED provider spoke with Neurosurgery Services and received recommendations.  Patient is admitted to hospital medicine services for further management.      PAST MEDICAL HISTORY:   Hold disorder   Anxiety   Depression  PTSD   JANES noncompliant with CPAP   Obesity    PAST SURGICAL HISTORY:   Denies    ALLERGIES:   Tree nut    FAMILY HISTORY:   Reviewed and negative    SOCIAL HISTORY:   No reports of alcohol, tobacco, or illicit drug use  Lives with family     HOME MEDICATIONS:   As documented  Prior to Admission medications    Medication Sig Start Date End Date Taking? Authorizing Provider   busPIRone (BUSPAR) 15 MG tablet Take 15 mg by mouth 3 (three) times daily.   Yes Provider, Historical   clonazePAM (KLONOPIN) 1 MG tablet Take 1 mg by mouth 2 (two) times daily as needed for Anxiety.   Yes Provider, Historical   DULoxetine (CYMBALTA) 60 MG capsule Take 60 mg by mouth once daily.   Yes Provider,  Historical   lisinopriL-hydrochlorothiazide (PRINZIDE,ZESTORETIC) 20-12.5 mg per tablet Take 1 tablet by mouth once daily.   Yes Provider, Historical   traZODone (DESYREL) 100 MG tablet Take 100 mg by mouth every evening.   Yes Provider, Historical   famotidine (PEPCID) 20 MG tablet Take 20 mg by mouth 2 (two) times daily.    Provider, Historical   hydrOXYzine pamoate (VISTARIL) 50 MG Cap Take 50 mg by mouth 3 (three) times daily. Take one to two capsules by mouth three times daily as needed for anxiety.    Provider, Historical   lurasidone (LATUDA) 40 mg Tab tablet Take 80 mg by mouth once daily.    Provider, Historical   ALPRAZolam (XANAX) 0.25 MG tablet Take 1 tablet (0.25 mg total) by mouth daily as needed for Anxiety. 5/23/22 9/27/24  Zarina Melvin, KARENP-C   busPIRone (BUSPAR) 15 MG tablet Take 1 tablet (15 mg total) by mouth 2 (two) times daily. 5/13/22 9/27/24  Zarina Melvin, KARENP-C   FLUoxetine 40 MG capsule Take 1 capsule (40 mg total) by mouth once daily. 5/13/22 9/27/24  Zarina Melvin, KARENP-C   ondansetron (ZOFRAN-ODT) 8 MG TbDL Take 1 tablet (8 mg total) by mouth every 8 (eight) hours as needed (nausea). 5/23/22 9/27/24  Zarina Melvin FNP-C   propranoloL (INDERAL) 20 MG tablet Take 1 tablet (20 mg total) by mouth 2 (two) times daily. 5/13/22 9/27/24  Zarina Melvin, KARENP-C       REVIEW OF SYSTEMS:   Except as documented, all other systems reviewed and negative     PHYSICAL EXAM:     VITAL SIGNS: 24 HRS MIN & MAX LAST   Temp  Min: 98.6 °F (37 °C)  Max: 98.6 °F (37 °C) 98.6 °F (37 °C)   BP  Min: 134/75  Max: 174/90 (!) 174/90   Pulse  Min: 80  Max: 101  101   Resp  Min: 14  Max: 17 17   SpO2  Min: 93 %  Max: 97 % 96 %       General appearance: Well-developed, well-nourished male in no apparent distress.  HENT: Atraumatic head. Moist mucous membranes of oral cavity.  Eyes: PERRL  Neck: Supple.   Lungs: Clear to auscultation bilaterally. No wheezing present.   Heart: Regular rate and rhythm. S1  and S2 present with no murmurs/gallop/rub. No pedal edema. No JVD present.   Abdomen: Soft, non-distended, non-tender. No rebound tenderness/guarding. Bowel sounds are normal.   Extremities: No cyanosis, clubbing, or edema.  Skin: No Rash.   Neuro:   Neuro  CN 2, 3, 4, 6: EOMI, nl visual fields, nl gross vision test, finger to nose ataxia bilaterally  CN 5: nl chewing, nl facial sensation  CN 7: nl smile, nl brow wrinkle, nl eyes closure  CN 8: nl hearing  CN 9, 10: nl uvular elevation on phonation  CN 11: nl shoulder shrug  CN 12: nl tongue protrusion, midline, nl lateral deviation   Strength: nl strength proximally and distally, upper and lower extremity  Sensation: nl to light touch proximally and distally, upper and lower extremity  Speech: clear, no dysarthria  Thinking: clear, goal oriented, no delusions  Psych/mental status: Appropriate mood and affect. Responds appropriately to questions.     LABS AND IMAGING:     Recent Labs   Lab 09/27/24  0218   WBC 8.59   RBC 5.03   HGB 16.4   HCT 48.3   MCV 96.0*   MCH 32.6*   MCHC 34.0   RDW 12.5      MPV 9.1       Recent Labs   Lab 09/27/24  0218      K 4.6      CO2 26   BUN 13.1   CREATININE 1.12   CALCIUM 9.1   MG 2.10   ALBUMIN 3.9   ALKPHOS 65   ALT 58*   AST 39*   BILITOT 0.4       Microbiology Results (last 7 days)       ** No results found for the last 168 hours. 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      VTE Prophylaxis: SCD for DVT prophylaxis and will be advised to be as mobile as possible and sit in a chair as tolerated    Patient condition:  Stable    __________________________________________________________________________  INPATIENT LIST OF MEDICATIONS     Scheduled Meds:   dexAMETHasone  6 mg Intravenous Q8H    levETIRAcetam (Keppra) IV (PEDS and ADULTS)  1,500 mg Intravenous Q12H    [START ON 9/29/2024] mupirocin   Nasal BID     Continuous Infusions:  PRN Meds:.  Current Facility-Administered Medications:     acetaminophen, 650 mg, Oral, Q6H PRN    aluminum-magnesium hydroxide-simethicone, 30 mL, Oral, QID PRN    melatonin, 6 mg, Oral, Nightly PRN    naloxone, 0.02 mg, Intravenous, PRN    ondansetron, 4 mg, Intravenous, Q4H PRN    polyethylene glycol, 17 g, Oral, BID PRN    prochlorperazine, 5 mg, Intravenous, Q6H PRN    simethicone, 1 tablet, Oral, QID PRN      I, MICHAEL Graham have reviewed and discussed the case with   Dr. Miguel Smith. Please see the following addendum for further assessment and plan from their attending MD.  MICHAEL Singh   09/27/2024    Dr. Smith 09/27/2024-chart reviewed patient examined.  Patient is a 34-year-old white male that had a sudden onset of seizure activity at home in front of parents.  Patient was taken to [facility] where he was transferred to [facility] after findings of a right temporal brain mass.  Patient was seen at emergency room [facility] and admitted to hospitalist services.  Patient has been seen by Neurosurgery.  CT chest abdomen and pelvis was done showing no signs of masses or acute findings.  This was followed by an MRI brain with and without contrast with findings of a right temporal lobe  numerous enhancing linear and nodular lesions which extend medially along midbrain margin and there additional enhancing lesions bilateral cerebellar peduncles.  Also heterogeneity and some enhancement of the right aspect of the midbrain.  There is around this substantial brain edema, mass effect and midline shift.  Findings are concerning to be neoplasm and could be metastatic.  There is no acute brain infarct.  Patient has been getting Keppra as well as steroids for his edema.  Again he was seen by neuro surgery with plans for re-evaluation and possibly discharge tomorrow to follow with Dr. Serna with Neurosurgery.  Patient admitted to room 845 2 nurses present at the time of my evaluation.  I was made aware willing unit that patient has decided to leave against medical advice.  Patient is alert/active and oriented.  Voices some vague complaints at the present moment concerning headaches.  There is no gross motor deficits.  He knows his name/location that he is at the present moment/he knows his address and is oriented today/month/year.  He refers that he wants to go to his house to see his family.  I explained findings on CT abdomen chest and pelvis as well as MRI.  Also explained that he has substantial cerebral edema with numerous bilateral masses.  I also make him to stay so we can continue his steroids/antiepileptics and that way we can make plans for him to follow-up tomorrow with Neurosurgery as outpatient.  The patient apologized but refers that he has made his decision and he wants to go home.  At that moment I offered him AMA papers which he signs with date.  Patient left SANGEETA Smith MD  09/27/2024   9:42 p.m..          Discharge Planning and Disposition: No mobility needs. Ambulating well. Good social support system.   Anticipated discharge    All diagnosis and differential diagnosis have been reviewed; assessment and plan has been documented; I have personally reviewed the labs and test results  that are presently available; I have reviewed the patients medication list; I have reviewed the consulting providers response and recommendations. I have reviewed or attempted to review medical records based upon their availability.    All of the patient and family questions have been addressed and answered. Patient's is agreeable to the above stated plan. I will continue to monitor closely and make adjustments to medical management as needed.

## 2024-09-27 NOTE — NURSING
Admission documentation completed by the admit nurse. Home med rec complete. Pt did elect for meds to bed with [facility]. 4 Eyes and standing weight to be completed by the admitting floor nurse.

## 2024-09-27 NOTE — PLAN OF CARE
Problem: Adult Inpatient Plan of Care  Goal: Plan of Care Review  Outcome: Progressing  Goal: Patient-Specific Goal (Individualized)  Outcome: Progressing  Goal: Absence of Hospital-Acquired Illness or Injury  Outcome: Progressing  Intervention: Identify and Manage Fall Risk  Flowsheets (Taken 9/27/2024 1721)  Safety Promotion/Fall Prevention:   assistive device/personal item within reach   in recliner, wheels locked   instructed to call staff for mobility   medications reviewed   side rails raised x 2  Goal: Optimal Comfort and Wellbeing  Outcome: Progressing  Goal: Readiness for Transition of Care  Outcome: Progressing     Problem: Bariatric Environmental Safety  Goal: Safety Maintained with Care  Outcome: Progressing     Problem: Wound  Goal: Optimal Coping  Outcome: Progressing  Goal: Optimal Functional Ability  Outcome: Progressing  Goal: Absence of Infection Signs and Symptoms  Outcome: Progressing  Goal: Improved Oral Intake  Outcome: Progressing  Goal: Optimal Pain Control and Function  Outcome: Progressing  Intervention: Prevent or Manage Pain  Flowsheets (Taken 9/27/2024 1721)  Pain Management Interventions: quiet environment facilitated  Goal: Skin Health and Integrity  Outcome: Progressing  Goal: Optimal Wound Healing  Outcome: Progressing

## 2024-09-28 VITALS
HEIGHT: 71 IN | RESPIRATION RATE: 20 BRPM | DIASTOLIC BLOOD PRESSURE: 78 MMHG | WEIGHT: 315 LBS | TEMPERATURE: 98 F | BODY MASS INDEX: 44.1 KG/M2 | OXYGEN SATURATION: 94 % | SYSTOLIC BLOOD PRESSURE: 141 MMHG | HEART RATE: 100 BPM

## 2024-09-28 LAB
ALBUMIN SERPL-MCNC: 4.1 G/DL (ref 3.5–5)
ALBUMIN/GLOB SERPL: 1.1 RATIO (ref 1.1–2)
ALP SERPL-CCNC: 66 UNIT/L (ref 40–150)
ALT SERPL-CCNC: 58 UNIT/L (ref 0–55)
ANION GAP SERPL CALC-SCNC: 13 MEQ/L
AST SERPL-CCNC: 31 UNIT/L (ref 5–34)
BASOPHILS # BLD AUTO: 0.02 X10(3)/MCL
BASOPHILS NFR BLD AUTO: 0.1 %
BILIRUB SERPL-MCNC: 0.4 MG/DL
BUN SERPL-MCNC: 18.6 MG/DL (ref 8.9–20.6)
CALCIUM SERPL-MCNC: 9.8 MG/DL (ref 8.4–10.2)
CHLORIDE SERPL-SCNC: 103 MMOL/L (ref 98–107)
CO2 SERPL-SCNC: 27 MMOL/L (ref 22–29)
CREAT SERPL-MCNC: 1.08 MG/DL (ref 0.73–1.18)
CREAT/UREA NIT SERPL: 17
EOSINOPHIL # BLD AUTO: 0 X10(3)/MCL (ref 0–0.9)
EOSINOPHIL NFR BLD AUTO: 0 %
ERYTHROCYTE [DISTWIDTH] IN BLOOD BY AUTOMATED COUNT: 12.4 % (ref 11.5–17)
GFR SERPLBLD CREATININE-BSD FMLA CKD-EPI: >60 ML/MIN/1.73/M2
GLOBULIN SER-MCNC: 3.6 GM/DL (ref 2.4–3.5)
GLUCOSE SERPL-MCNC: 176 MG/DL (ref 74–100)
HCT VFR BLD AUTO: 50.1 % (ref 42–52)
HGB BLD-MCNC: 16.9 G/DL (ref 14–18)
IMM GRANULOCYTES # BLD AUTO: 0.1 X10(3)/MCL (ref 0–0.04)
IMM GRANULOCYTES NFR BLD AUTO: 0.7 %
LYMPHOCYTES # BLD AUTO: 1.08 X10(3)/MCL (ref 0.6–4.6)
LYMPHOCYTES NFR BLD AUTO: 7.5 %
MAGNESIUM SERPL-MCNC: 2.3 MG/DL (ref 1.6–2.6)
MCH RBC QN AUTO: 32.4 PG (ref 27–31)
MCHC RBC AUTO-ENTMCNC: 33.7 G/DL (ref 33–36)
MCV RBC AUTO: 96.2 FL (ref 80–94)
MONOCYTES # BLD AUTO: 0.47 X10(3)/MCL (ref 0.1–1.3)
MONOCYTES NFR BLD AUTO: 3.3 %
NEUTROPHILS # BLD AUTO: 12.71 X10(3)/MCL (ref 2.1–9.2)
NEUTROPHILS NFR BLD AUTO: 88.4 %
NRBC BLD AUTO-RTO: 0 %
PLATELET # BLD AUTO: 285 X10(3)/MCL (ref 130–400)
PMV BLD AUTO: 9.9 FL (ref 7.4–10.4)
POCT GLUCOSE: 292 MG/DL (ref 70–110)
POTASSIUM SERPL-SCNC: 4.5 MMOL/L (ref 3.5–5.1)
PROT SERPL-MCNC: 7.7 GM/DL (ref 6.4–8.3)
RBC # BLD AUTO: 5.21 X10(6)/MCL (ref 4.7–6.1)
SODIUM SERPL-SCNC: 143 MMOL/L (ref 136–145)
WBC # BLD AUTO: 14.38 X10(3)/MCL (ref 4.5–11.5)

## 2024-09-28 PROCEDURE — 83735 ASSAY OF MAGNESIUM: CPT | Performed by: INTERNAL MEDICINE

## 2024-09-28 PROCEDURE — 99233 SBSQ HOSP IP/OBS HIGH 50: CPT | Mod: ,,, | Performed by: NEUROLOGICAL SURGERY

## 2024-09-28 PROCEDURE — 63600175 PHARM REV CODE 636 W HCPCS: Performed by: INTERNAL MEDICINE

## 2024-09-28 PROCEDURE — 99900035 HC TECH TIME PER 15 MIN (STAT)

## 2024-09-28 PROCEDURE — S4991 NICOTINE PATCH NONLEGEND: HCPCS | Performed by: INTERNAL MEDICINE

## 2024-09-28 PROCEDURE — 80053 COMPREHEN METABOLIC PANEL: CPT | Performed by: INTERNAL MEDICINE

## 2024-09-28 PROCEDURE — 85025 COMPLETE CBC W/AUTO DIFF WBC: CPT | Performed by: INTERNAL MEDICINE

## 2024-09-28 PROCEDURE — 36415 COLL VENOUS BLD VENIPUNCTURE: CPT | Performed by: INTERNAL MEDICINE

## 2024-09-28 PROCEDURE — 27000221 HC OXYGEN, UP TO 24 HOURS

## 2024-09-28 PROCEDURE — 25000003 PHARM REV CODE 250: Performed by: INTERNAL MEDICINE

## 2024-09-28 RX ORDER — DEXAMETHASONE 4 MG/1
4 TABLET ORAL 2 TIMES DAILY
Qty: 60 TABLET | Refills: 0 | Status: SHIPPED | OUTPATIENT
Start: 2024-09-28 | End: 2024-10-28

## 2024-09-28 RX ORDER — IBUPROFEN 200 MG
1 TABLET ORAL DAILY
Status: DISCONTINUED | OUTPATIENT
Start: 2024-09-28 | End: 2024-09-28 | Stop reason: HOSPADM

## 2024-09-28 RX ORDER — LEVETIRACETAM 1000 MG/1
1000 TABLET ORAL 2 TIMES DAILY
Qty: 60 TABLET | Refills: 0 | Status: SHIPPED | OUTPATIENT
Start: 2024-09-28 | End: 2024-10-28

## 2024-09-28 RX ORDER — PANTOPRAZOLE SODIUM 40 MG/1
40 TABLET, DELAYED RELEASE ORAL DAILY
Qty: 30 TABLET | Refills: 0 | Status: SHIPPED | OUTPATIENT
Start: 2024-09-28 | End: 2024-10-28

## 2024-09-28 RX ADMIN — DULOXETINE HYDROCHLORIDE 60 MG: 30 CAPSULE, DELAYED RELEASE ORAL at 08:09

## 2024-09-28 RX ADMIN — LISINOPRIL 20 MG: 20 TABLET ORAL at 08:09

## 2024-09-28 RX ADMIN — LEVETIRACETAM 1500 MG: 500 TABLET, FILM COATED ORAL at 12:09

## 2024-09-28 RX ADMIN — BUSPIRONE HYDROCHLORIDE 15 MG: 5 TABLET ORAL at 04:09

## 2024-09-28 RX ADMIN — CLONAZEPAM 1 MG: 1 TABLET ORAL at 04:09

## 2024-09-28 RX ADMIN — DEXAMETHASONE 4 MG: 4 TABLET ORAL at 06:09

## 2024-09-28 RX ADMIN — TRAZODONE HYDROCHLORIDE 100 MG: 100 TABLET ORAL at 12:09

## 2024-09-28 RX ADMIN — LURASIDONE HYDROCHLORIDE 80 MG: 40 TABLET, FILM COATED ORAL at 08:09

## 2024-09-28 RX ADMIN — LEVETIRACETAM 1500 MG: 500 TABLET, FILM COATED ORAL at 08:09

## 2024-09-28 RX ADMIN — CLONAZEPAM 1 MG: 1 TABLET ORAL at 03:09

## 2024-09-28 RX ADMIN — FAMOTIDINE 20 MG: 20 TABLET, FILM COATED ORAL at 08:09

## 2024-09-28 RX ADMIN — HYDROCHLOROTHIAZIDE 12.5 MG: 12.5 TABLET ORAL at 08:09

## 2024-09-28 RX ADMIN — NICOTINE 1 PATCH: 21 PATCH, EXTENDED RELEASE TRANSDERMAL at 08:09

## 2024-09-28 RX ADMIN — DEXAMETHASONE 4 MG: 4 TABLET ORAL at 04:09

## 2024-09-28 RX ADMIN — BUSPIRONE HYDROCHLORIDE 15 MG: 5 TABLET ORAL at 08:09

## 2024-09-28 RX ADMIN — DEXAMETHASONE 4 MG: 4 TABLET ORAL at 12:09

## 2024-09-28 NOTE — NURSING
Patient calls out requesting to leave. Patient educated on condition and necessity of staying in the hospital for treatment for his health and safety. Patient states he understands but really needs to get home as he misses his kids and feels like he's needed there more. AAO x 4. Provider to be notified.

## 2024-09-28 NOTE — NURSING
Patient left floor to smoke after being educated on necessity of staying on unit for close monitoring. KATY Conway went to ED to assist patient back to room. Patient re-educated that this is a smoke-free facility and leaving the unit to smoke could result in falls or injury as patient is on seizure and fall precautions - patient verbalized understanding. Patient oriented to bed alarm and agreed to it being placed on at this time, verbalized understanding of fall and seizure precautions and necessity.

## 2024-09-28 NOTE — NURSING
Patient requesting to rescind Man RUTHERFORD CN is second nurse verfiying patient's decision to stay. Patient educated on  safety measures, bed low/locked, siderails in use x 3 (seizure precautions), fall band, bed alarm, and non-skid socks - verbalized understanding.

## 2024-09-28 NOTE — NURSING
"Spoke to Dr. ROGER Jackson and  Vanesa Chamberlain NP about patient signing AMA paperwork and now agreeing to stay. Dr. Jackson states, "Okay for patient to stay and to continue Decadron, Keppra, and Trazadone as previously ordered."   "

## 2024-09-28 NOTE — NURSING
Dr. Smith on floor at this time, spoke to him about patient's wish to leave AMA. 2126: Provider seeing patient at the bedside at this time, explains risks of leaving AMA to patient. Patient verbalized understanding but adamant about leaving AMA. Patient, provider, and Shabbir sign AMA paperwork. Patient looking for a ride home.

## 2024-09-28 NOTE — DISCHARGE SUMMARY
[facility] Hospital Medicine Discharge Summary    Admit Date: 9/27/2024  Discharge Date and Time: 9/28/20245:17 PM  Admitting Physician:  Team  Discharging Physician: Miguel Smith MD.  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 male who PMH includes bipolar disorder, anxiety, depression, PTSD, JANES non complaint with CPAP; presented to the ED at [facility] on 09/26/2024  with new onset seizure activity, and altered mental status.  Patient reports no prior history of seizure activity.  Workup at [facility] CT head without contrast demonstrated large homogeneous mass center in the right temporal lobe.  No neurosurgery Services available at [facility].  Patient was transferred to [facility] on 9/27/2024 for neurosurgery Services. Patient reports he was never had seizure activity before presenting to the [facility] facility.  Reports he was eating lunch sitting in his chair with his parents and he was told that he suddenly went stiff became unresponsive then vomited and woke up.  He reports that over the last 1-2 weeks he has been experiencing headaches with cough; He reports when he coughs he develops a severe splitting headache like his head is about a bust.  States he gets nauseated during those spells.  They last 1-2 minutes and then resolved.  Patient does endorse feeling off balance over the past 2 days while ambulating.  He denies any injury trauma or falls. Denies any vision changes denies headache currently no nausea or vomiting currently.  He denies any numbness or weakness in his extremities.  He states it was only previous CT of his  brain was when he was a child of approximately 9 years of age because he was having some intermittent numbness weakness in his leg but he reports the CT of the head was normal at that time he was far as he knows.  He denies any prior episode of seizures.  No reported chest pain, fever, chills, or any sick contacts.  Lab work reviewed demonstrated AST 39, ALT 58; glucose 164, other indices unremarkable.  Initial vital signs /106 pulse 80 respirations 16 temperature 98.6° F O2 saturation 94% on room air.  Patient's blood pressure subsequently trended down, O2 saturation improved.  Neurosurgery Services were consulted and ED provider spoke with Neurosurgery Services and received recommendations.  Patient is admitted to hospital medicine services for further management    At the time of my evaluation pt referred that he wanted to leave hospital. I explained  findings  on imaging as well as consequences of leaving AMA. PT fully oriented and signed AMA just to end up staying.  Pt was continued on steroids iv as well as keppra. He was seen by dr sanchez next day with much improvement of headaches. Pt was cleared for discharge home on keppra 1 G po bi as well as dexamethasone 4 mgs po bid . Pt to follow up with his [address] [place] [address] physician  Monday and dr linares with [facility] in 2 weeks .   Pt was seen and examined on the day of discharge  Vitals:  VITAL SIGNS: 24 HRS MIN & MAX LAST   Temp  Min: 97.4 °F (36.3 °C)  Max: 98.6 °F (37 °C) 98.2 °F (36.8 °C)   BP  Min: 126/80  Max: 158/83 (!) 141/78   Pulse  Min: 92  Max: 109  100   Resp  Min: 20  Max: 22 20   SpO2  Min: 92 %  Max: 98 % (!) 94 %       Physical Exam:  General appearance: Well-developed, well-nourished male in no apparent distress.  HENT: Atraumatic head. Moist mucous membranes of oral cavity.  Eyes: PERRL  Neck: Supple.   Lungs: Clear to auscultation bilaterally. No wheezing present.   Heart: Regular rate and rhythm. S1 and S2 present with no murmurs/gallop/rub. No  pedal edema. No JVD present.   Abdomen: Soft, non-distended, non-tender. No rebound tenderness/guarding. Bowel sounds are normal.   Extremities: No cyanosis, clubbing, or edema.  Skin: No Rash.   Neuro:   Neuro  CN 2, 3, 4, 6: EOMI, nl visual fields, nl gross vision test, finger to nose ataxia bilaterally  CN 5: nl chewing, nl facial sensation  CN 7: nl smile, nl brow wrinkle, nl eyes closure  CN 8: nl hearing  CN 9, 10: nl uvular elevation on phonation  CN 11: nl shoulder shrug  CN 12: nl tongue protrusion, midline, nl lateral deviation   Strength: nl strength proximally and distally, upper and lower extremity  Sensation: nl to light touch proximally and distally, upper and lower extremity  Speech: clear, no dysarthria  Thinking: clear, goal oriented, no delusions  Psych/mental status: Appropriate mood and affect. Responds appropriately to questions.        Recent Labs   Lab 09/27/24 0218 09/28/24 0347   WBC 8.59 14.38*   RBC 5.03 5.21   HGB 16.4 16.9   HCT 48.3 50.1   MCV 96.0* 96.2*   MCH 32.6* 32.4*   MCHC 34.0 33.7   RDW 12.5 12.4    285   MPV 9.1 9.9       Recent Labs   Lab 09/27/24 0218 09/28/24  0347    143   K 4.6 4.5    103   CO2 26 27   BUN 13.1 18.6   CREATININE 1.12 1.08   CALCIUM 9.1 9.8   MG 2.10 2.30   ALBUMIN 3.9 4.1   ALKPHOS 65 66   ALT 58* 58*   AST 39* 31   BILITOT 0.4 0.4        Microbiology Results (last 7 days)       ** No results found for the last 168 hours. **             MRI Brain [facility] Stealth W/WO Contrast  Narrative: EXAMINATION:  MRI brain OMNI stealth within weight of without contrast.    CLINICAL HISTORY:  Right temporoparietal mass  Impression: This exam was performed according to the surgical planning purpose.  There is no charge rendered for this exam.    Electronically signed by: Juan Hernández  Date:    09/27/2024  Time:    19:10  MRI Brain W WO Contrast  Narrative: EXAMINATION:  MRI BRAIN W WO CONTRAST    CLINICAL HISTORY:  Brain/CNS neoplasm,  staging;    TECHNIQUE:  Multiplanar MRI sequences were performed of the brain without in following administration gadolinium based contrast.    COMPARISON:  Outside facility CT brain without contrast September 26, 2024    FINDINGS:  The left cerebellar peduncle is remarkable for heterogeneous enhancing lesion measuring 1.8 x 1.2 cm on image 50 series 18 with surrounding edematous changes.  This slightly causes compressive effect upon the 4th ventricle without significant narrowing.  There are also right cerebellar peduncle smaller enhancing lesions on image 71 series 18 which again are surrounded by brain edematous changes.  There is substantial heterogeneity of the right aspect of midbrain with subtle heterogeneous enhancement.  There are numerous heterogeneous linear and nodular enhancements of the right temporal lobe.  There are minimal associated dephasing artifacts on the gradient echo sequence reflective of old hemorrhagic byproducts.  There is also separate enhancing focus of the right basal ganglia on image 125 series 18.  These findings are surrounded by substantial edema which result in effacement of the sulci and there is compressive effect upon the right lateral ventricle.  Right to left midline shift is 6-7 mm.  The left lateral ventricle is mildly dilated.    There are periventricular T2 FLAIR hyperintensities which likely represent transependymal flow of the cerebrospinal fluid.  There is no diffusion weighted restriction or altered signal on the ADC map to reflect an acute infarct.  No cerebral convexities fluid collection.  Impression: 1.  Right temporal lobe numerous enhancing linear and nodular lesions which extend medially along midbrain margin and there are additional enhancing lesions bilateral cerebellar peduncles.  Also heterogeneity and some enhancement of the right aspect of midbrain.  Surrounding substantial brain edema, mass effect and midline shift.  Findings are concerning to be  neoplasm and could be metastatic.    2.  No acute brain infarct.    .    Electronically signed by: Juan Hernández  Date:    09/27/2024  Time:    19:08  US Abdomen Limited  Narrative: EXAMINATION:  US ABDOMEN LIMITED    CLINICAL HISTORY:  elevated liver enzymes;    COMPARISON:  CT earlier today.    FINDINGS:  Grayscale, color and spectral doppler evaluation of the right upper quadrant.    The pancreas is obscured.  Imaged portion of the IVC is normal in caliber.    The liver is mildly enlarged and there is heterogeneous increased hepatic parenchymal echogenicity.  Normal hepatopetal flow is noted in the portal vein.    The gallbladder is not visualized.  The common bile duct is not visualized.    Right kidney measures 13.5 cm in length. No hydronephrosis.  Impression: 1. Hepatomegaly with steatosis.  2. Gallbladder and common bile duct not visualized.  No significant abnormality of the gallbladder or biliary tree evident on same day CT.    Electronically signed by: Sha Duran  Date:    09/27/2024  Time:    11:11  CT Chest Abdomen Pelvis With IV Contrast (XPD) NO Oral Contrast  Narrative: EXAMINATION:  CT CHEST ABDOMEN PELVIS WITH IV CONTRAST (XPD)    CLINICAL HISTORY:  Metastatic disease evaluation;    TECHNIQUE:  Helical acquisition from the thoracic inlet through the ischia with  IV contrast. Three plane reconstructions made available for review. DLP 1345 mGycm. Automatic exposure control, adjustment of mA/kV or iterative reconstruction technique was used to reduce radiation.    COMPARISON:  None available.    FINDINGS:  Chest.    Heart size is within normal limits.  No pericardial effusion.    No mediastinal, hilar or axillary adenopathy by CT size criteria.  There is gynecomastia.    There is no pleural effusion.  There is no suspicious pulmonary nodule.  Mild dependent atelectasis.    Abdomen and pelvis.    The liver is mildly enlarged and there is hepatic steatosis.  Patent portal vein.  No significant  abnormality of the gallbladder, spleen, pancreas or adrenals.    No hydronephrosis.  There is a nonobstructing calculus right kidney.    No bowel obstruction.  No suspicious bowel wall thickening.  No mesenteric adenopathy.    Urinary bladder unremarkable. No pelvic free fluid. Abdominal aorta normal in caliber.  No retroperitoneal or inguinal adenopathy.    There is no suspicious osseous lesion.  Mild degenerative change of the spine.  Impression: No convincing CT evidence of malignancy chest, abdomen or pelvis.    Electronically signed by: Sha Duran  Date:    09/27/2024  Time:    10:28         Medication List        CONTINUE taking these medications      busPIRone 15 MG tablet  Commonly known as: BUSPAR     clonazePAM 1 MG tablet  Commonly known as: KlonoPIN     DULoxetine 60 MG capsule  Commonly known as: CYMBALTA     famotidine 20 MG tablet  Commonly known as: PEPCID     hydrOXYzine pamoate 50 MG Cap  Commonly known as: VISTARIL     LATUDA 40 mg Tab tablet  Generic drug: lurasidone     lisinopriL-hydrochlorothiazide 20-12.5 mg per tablet  Commonly known as: PRINZIDE,ZESTORETIC     traZODone 100 MG tablet  Commonly known as: DESYREL               Explained in detail to the patient about the discharge plan, medications, and follow-up visits. Pt understands and agrees with the treatment plan  Discharge Disposition:   home   Discharged Condition: stable  Diet- cardiac   Dietary Orders (From admission, onward)       Start     Ordered    09/27/24 1307  Diet Adult Regular  Diet effective now         09/27/24 1306                   Medications Per DC med rec  Activities as tolerated   Follow-up Information       Zarina Melvin, FNP-C Follow up.    Specialty: Family Medicine  Contact information:  [facility] [address] [phone]               Cole Smith MD. Schedule an appointment as soon as possible for a visit in 2 week(s).    Specialty:  Neurosurgery  Contact information:  [address] [place] [address] [address] [place] [address]  [phone]                           For further questions contact hospitalist office    Discharge time 33 minutes    For worsening symptoms, chest pain, shortness of breath, increased abdominal pain, high grade fever, stroke or stroke like symptoms, immediately go to the nearest Emergency Room or call 911 as soon as possible.      Miguel Morejon M.D, on 9/28/2024. at 5:17 PM.

## 2024-09-28 NOTE — NURSING
Nurses Note -- 4 Eyes      9/27/2024   1845 PM      Skin assessed during: Q Shift Change      [] No Altered Skin Integrity Present    []Prevention Measures Documented      [x] Yes- Altered Skin Integrity Present or Discovered   [] LDA Added if Not in Epic (Describe Wound)   [x] New Altered Skin Integrity was Present on Admit and Documented in LDA   [x] Wound Image Taken    Wound Care Consulted? Yes    Attending Nurse:  VIDYA Orantes    Second RN/Staff Member:  TERE Partida

## 2024-09-28 NOTE — PROGRESS NOTES
[facility] Acute  Wound Care    Patient Name:  Luis Gomes   MRN:  [id number]  Date: 9/28/2024  Diagnosis: CNS mass    History:     Past Medical History:   Diagnosis Date    Bipolar disorder 3/4/2022    Depression     Posttraumatic stress disorder 3/19/2019       Social History     Socioeconomic History    Marital status: Single   Tobacco Use    Smoking status: Never    Smokeless tobacco: Never     Social Drivers of Health     Financial Resource Strain: Low Risk  (9/27/2024)    Overall Financial Resource Strain (CARDIA)     Difficulty of Paying Living Expenses: Not hard at all   Food Insecurity: No Food Insecurity (9/27/2024)    Hunger Vital Sign     Worried About Running Out of Food in the Last Year: Never true     Ran Out of Food in the Last Year: Never true   Transportation Needs: No Transportation Needs (9/27/2024)    TRANSPORTATION NEEDS     Transportation : No   Physical Activity: Inactive (9/27/2024)    Exercise Vital Sign     Days of Exercise per Week: 0 days     Minutes of Exercise per Session: 0 min   Stress: Stress Concern Present (9/27/2024)    [employer] [place] [employer] - Occupational Stress Questionnaire     Feeling of Stress : To some extent   Housing Stability: Low Risk  (9/27/2024)    Housing Stability Vital Sign     Unable to Pay for Housing in the Last Year: No     Homeless in the Last Year: No       Precautions:     Allergies as of 09/26/2024    (No Known Allergies)       WO Assessment Details/Treatment      09/28/24 1128   WOCN Assessment   Visit Date 09/28/24   Visit Time 1128   Consult Type New   WOCN Speciality Wound   Intervention chart review;assessed;applied;orders   Teaching on-going        Wound 09/27/24 1230 Other (comment) lower Sacral spine #1   Date First Assessed/Time First Assessed: 09/27/24 1230   Present on Original Admission: Yes  Primary Wound Type: (c) Other (comment)  Orientation: lower  Location: Sacral spine  Wound Number: #1  Is  this injury device related?: No   Wound Image     Dressing Appearance Open to air   Drainage Amount Small   Drainage Characteristics/Odor Sanguineous   Appearance Red;Moist;Bleeding   Tissue loss description Full thickness   Black (%), Wound Tissue Color 0 %   Red (%), Wound Tissue Color 100 %   Yellow (%), Wound Tissue Color 0 %   Periwound Area Intact;Dry   Wound Length (cm) 1.1 cm   Wound Width (cm) 0.5 cm   Wound Depth (cm) 0.4 cm   Wound Volume (cm^3) 0.22 cm^3   Wound Surface Area (cm^2) 0.55 cm^2   Care Cleansed with:;Soap and water   Dressing Applied;Foam     WOCN consulted for sacrum. Discussed plan of care with nurse Partida prior to visiting the patient. Treatment recommendation in place. Sacrum: Cleanse with soap and water, dry well. Apply foam, change daily and PRN. Educated the patient on the importance of maintaining good hygiene regimen and he verbalized understanding. Patient verbalized wanting to have his mother roll him downstairs so he could smoke, told patient I wound notify nurse but that was not my decision to make. Nursing to continue with treatment recommendations and other preventative measures. Patient does mobilize self. No further questions. Will follow up.   09/28/2024

## 2024-09-28 NOTE — PROGRESS NOTES
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  He is awake, alert and oriented in all 4 spheres.  His speech is fluent.    Plan is for discharge home today on Decadron 4mg bid and Keppra 1000mg bid with plans to follow up with Dr. Serna in 2 weeks his blood sugar was 176 this morning and that we will need to be watched now that he is on steroids.

## 2024-09-28 NOTE — DISCHARGE INSTR - SUPPLEMENTARY INSTRUCTIONS
Follow up with your primary care physician this coming week . You are on steroids that will increase your blood sugars/make you thirsty and urinate  more .

## 2024-09-28 NOTE — NURSING
Patient feeling restless, anxious and agitated, requesting to go smoke, messaged KOLBY Chamberlain NP regarding patient and requested nicotine patch.

## 2024-09-28 NOTE — NURSING
Reached out to Vanesa Chamberlain NP on call for hospitalist regarding patient wanting to rescind his AMA. Awaiting provider response.

## 2024-09-30 ENCOUNTER — TELEPHONE (OUTPATIENT)
Dept: NEUROSURGERY | Facility: CLINIC | Age: 34
End: 2024-09-30
Payer: MEDICAID

## 2024-10-02 NOTE — TELEPHONE ENCOUNTER
I called and spoke to patient  to schedule hospital follow up per Dr. Serna patient is to be scheduled on 10/14 no imaging. I scheduled patient on 10/14 with  at 10:20 patient verbalized understanding and was complaint with date and time of appointment.

## 2024-10-07 ENCOUNTER — TELEPHONE (OUTPATIENT)
Dept: NEUROSURGERY | Facility: CLINIC | Age: 34
End: 2024-10-07

## 2024-10-07 ENCOUNTER — TELEPHONE (OUTPATIENT)
Dept: NEUROSURGERY | Facility: HOSPITAL | Age: 34
End: 2024-10-07
Payer: MEDICAID

## 2024-10-07 NOTE — TELEPHONE ENCOUNTER
I spoke to patient over the phone. Patient stated he had been experiencing one seizure per day over the past 3 days. He would zone out for a few minutes and not recall what happened. He currently is taking Keppra 1000mg BID. He does not have a neurology provider. He also complained of trouble swallowing and aspirating whenever he eats in the past few days. I advised him to go to the ED if he is having seizure. The ED can increase his Keppra or switch him over to a different anti-seizure medication. His seizure needs to be controlled. Patient verbalized understanding. Dr. Serna and I will see him in clinic on Monday, October 14, 2024.

## 2024-10-08 ENCOUNTER — ANESTHESIA EVENT (OUTPATIENT)
Dept: SURGERY | Facility: HOSPITAL | Age: 34
End: 2024-10-08
Payer: MEDICAID

## 2024-10-09 ENCOUNTER — TELEPHONE (OUTPATIENT)
Dept: NEUROSURGERY | Facility: CLINIC | Age: 34
End: 2024-10-09
Payer: MEDICAID

## 2024-10-09 DIAGNOSIS — M48.062 LUMBAR STENOSIS WITH NEUROGENIC CLAUDICATION: Primary | ICD-10-CM

## 2024-10-09 NOTE — TELEPHONE ENCOUNTER
I spoke to the patient over the phone to see if he had gone to the ED after I advised him on 10/7/2024 to go to the ED for his seizures. Patient stated he had a lot of things going on his mind. He had someone staying with him and watching over him so he felt safe and didn't feel the need to go to the hospital. He had another seizure episode last night. He is compliant and taking his keppra 1000mg BID. I advised him to increase his keppra from 1000mg to 1500mg BID. If he has another seizure he should go to the ED. I told the patient that I will try to get in contact with a neurologist here in [place] and see if they could see him within the next few days for his seizures. Patient agreed to this plan. He verbalized understanding.

## 2024-10-10 NOTE — PROGRESS NOTES
[facility]  History & Physical  Neurosurgery      Luis Gomes   [id number]   1990       SUBJECTIVE:     Chief Complaint:  Preoperative visit    History of Present Illness:   Luis Gomes is a 34 y.o. left handed male with a past medical history of bipolar disorder, depression, anxiety, sleep apnea (noncompliant with CPAP), obesity, and hypertension.  Patient came to all [facility] as a transfer from [facility] after patient had new onset of seizure activity.  Patient was seen his chair with his parents when suddenly patient went stiff and became unresponsive and had an emesis episode and then woke up.  For the past 1-2 weeks, he had been experiencing headaches and nausea when he coughed.  He also reported unstable balance.  Patient's mother reported he had some cognitive and mental decline over the past few months.    Patient is accompanied by his mother and father at today's visit. He states his pain is 0/10 on a pain scale today. He states when he cough, strain, or lean over, he would experience pressure inside his head, ringing in both ears, and dizziness. He complains of blurry vision in both eyes. Sometimes, he has numbness in the right temporal region by his right eye. He also has paresthesia of the lips and inside the cheeks of his mouth. He complains of intermittent slurred speech. When he experiences a petite mal seizure, he would zone off and not make any sense.  He is unstable on his feet and has multiple falls.  His last fall was this morning and patient denies hitting his head or losing consciousness. He is incontinent every other day when he wakes up in the morning. He is ready to move forward with surgery.       Past Medical History:   Diagnosis Date    Anxiety     Bipolar disorder 03/04/2022    Brain mass     Depression     Fatty liver     High blood sugar     Insomnia     Posttraumatic stress disorder 03/19/2019    Seizures     Sleep apnea         No past surgical history  on file.     Social History     Tobacco Use    Smoking status: Every Day     Current packs/day: 0.50     Types: Cigarettes    Smokeless tobacco: Never   Substance Use Topics    Alcohol use: Yes     Comment: beer occ        Family History   Problem Relation Name Age of Onset    Mental illness Mother      Mental illness Father      Heart disease Father          Review of patient's allergies indicates:   Allergen Reactions    Tree nut         Current Outpatient Medications   Medication Instructions    busPIRone (BUSPAR) 15 mg, 3 times daily    clonazePAM (KLONOPIN) 1 mg, 2 times daily PRN    dexAMETHasone (DECADRON) 4 mg, Oral, 2 times daily    dicyclomine (BENTYL) 20 mg, Every 6 hours PRN    DULoxetine (CYMBALTA) 60 mg, Daily    DULoxetine (CYMBALTA) 30 mg    hydrOXYzine pamoate (VISTARIL) 50 mg, Every 8 hours PRN    hydrOXYzine pamoate (VISTARIL) 25-50 mg    ibuprofen (ADVIL,MOTRIN) 800 mg, 3 times daily    levETIRAcetam (KEPPRA) 1,000 mg, Oral, 2 times daily    lisinopriL-hydrochlorothiazide (PRINZIDE,ZESTORETIC) 20-12.5 mg per tablet 1 tablet, Daily    lurasidone (LATUDA) 80 mg, Daily    ondansetron (ZOFRAN-ODT) 4 mg, Every 6 hours PRN    pantoprazole (PROTONIX) 40 mg, Oral, Daily    traZODone (DESYREL) 100 mg, Nightly PRN    TRUE METRIX GLUCOSE METER Misc USE TO check glucose BEFORE meals AND AT BEDTIME    TRUE METRIX GLUCOSE TEST STRIP Strp 4 times daily    TRUEPLUS LANCETS 30 gauge Misc USE TO check glucose BEFORE meals AND AT BEDTIME          Review of Systems   Constitutional: Negative.    HENT:          Ringing in ears   Eyes:  Positive for blurred vision.   Respiratory: Negative.     Cardiovascular: Negative.    Gastrointestinal: Negative.    Genitourinary:         Incontinent of voiding    Musculoskeletal:  Positive for falls.   Skin: Negative.    Neurological:  Positive for dizziness, tingling, speech change and seizures.        Paresthesia of right temporal region by the right eye, lips, and inside the  cheeks of his mouth.   Slurred speech   Endo/Heme/Allergies: Negative.    Psychiatric/Behavioral:  Positive for depression. The patient is nervous/anxious and has insomnia.        OBJECTIVE:       There were no vitals taken for this visit.         Physical Exam    General:  Pleasant, Well-nourished, Well-groomed.    Head:  Normocephalic without any obvious abnormality.     Cardiovascular:  Heart has regular rate and rhythm.  No bruits in bilateral carotids.     Lungs:  Breathing is quiet, non-lablored  Auscultate clear bilateral lungs.    Neurological:  Oriented to person, place, time, and situation.  Appropriate quality, quantity and organization of sentences.  Memory, cognition, and affect are appropriate.  Pupils are equal, round, and reactive to light  Extraocular movements are intact.  Visual field intact in all 4 quadrants.   Facial symmetric.   Tongue midline, facial sensation equal bilaterally.   Speech is clear and coherent.   Gait is unsteady.         ASSESSMENT:       ICD-10-CM ICD-9-CM   1. Brain mass  G93.89 348.89        PLAN:     Luis Gomes returns today for his preoperative visit. His symptoms continue to worsen. He is ready to move forward with surgery. He is scheduled for a right temporal craniotomy for tumor excision on 10/15/2024 with Dr. Serna. The risks and benefits were explained to the patient in a language he seems to understand including bleeding, infection, need for additional procedures, no change in current status, progressive decline, injury to major vessels, stroke, hemorrhage, loss of function, coma, injury to spinal nerve roots causing temporary or permanent paralysis. No assurance was given as squeale that the patient is suffering from at this time. Once patient voiced understanding consents were signed and surgery was scheduled.       Follow up at 2 week post-op on 10/30/2024 at 0900am.         Azul Del Toro, [facility]  Neurosurgery  [facility]    Disclaimer:  This  note is prepared using voice recognition software and as such is likely to have errors despite attempts at proofreading. Please contact me for questions.

## 2024-10-14 ENCOUNTER — LAB VISIT (OUTPATIENT)
Dept: LAB | Facility: HOSPITAL | Age: 34
End: 2024-10-14
Payer: MEDICAID

## 2024-10-14 ENCOUNTER — OFFICE VISIT (OUTPATIENT)
Dept: NEUROSURGERY | Facility: CLINIC | Age: 34
End: 2024-10-14
Payer: MEDICAID

## 2024-10-14 ENCOUNTER — PATIENT MESSAGE (OUTPATIENT)
Dept: SURGERY | Facility: HOSPITAL | Age: 34
End: 2024-10-14
Payer: MEDICAID

## 2024-10-14 VITALS
DIASTOLIC BLOOD PRESSURE: 86 MMHG | WEIGHT: 315 LBS | HEIGHT: 71 IN | SYSTOLIC BLOOD PRESSURE: 134 MMHG | BODY MASS INDEX: 44.1 KG/M2 | RESPIRATION RATE: 18 BRPM | HEART RATE: 78 BPM

## 2024-10-14 DIAGNOSIS — G93.89 BRAIN MASS: ICD-10-CM

## 2024-10-14 DIAGNOSIS — I10 HYPERTENSION, UNSPECIFIED TYPE: ICD-10-CM

## 2024-10-14 DIAGNOSIS — G93.89 BRAIN MASS: Primary | ICD-10-CM

## 2024-10-14 DIAGNOSIS — Z01.818 PREOPERATIVE TESTING: ICD-10-CM

## 2024-10-14 DIAGNOSIS — Z01.818 PREOPERATIVE TESTING: Primary | ICD-10-CM

## 2024-10-14 DIAGNOSIS — K21.9 GASTROESOPHAGEAL REFLUX DISEASE, UNSPECIFIED WHETHER ESOPHAGITIS PRESENT: ICD-10-CM

## 2024-10-14 LAB
BACTERIA #/AREA URNS AUTO: ABNORMAL /HPF
BILIRUB UR QL STRIP.AUTO: NEGATIVE
CLARITY UR: CLEAR
COLOR UR AUTO: ABNORMAL
GLUCOSE UR QL STRIP: ABNORMAL
HGB UR QL STRIP: NEGATIVE
KETONES UR QL STRIP: NEGATIVE
LEUKOCYTE ESTERASE UR QL STRIP: NEGATIVE
NITRITE UR QL STRIP: NEGATIVE
OHS QRS DURATION: 82 MS
OHS QTC CALCULATION: 431 MS
PH UR STRIP: 6 [PH]
PROT UR QL STRIP: ABNORMAL
RBC #/AREA URNS AUTO: ABNORMAL /HPF
SP GR UR STRIP.AUTO: 1.03 (ref 1–1.03)
SQUAMOUS #/AREA URNS LPF: ABNORMAL /HPF
UROBILINOGEN UR STRIP-ACNC: 4
WBC #/AREA URNS AUTO: ABNORMAL /HPF

## 2024-10-14 PROCEDURE — 93005 ELECTROCARDIOGRAM TRACING: CPT

## 2024-10-14 PROCEDURE — 3079F DIAST BP 80-89 MM HG: CPT | Mod: CPTII,,, | Performed by: STUDENT IN AN ORGANIZED HEALTH CARE EDUCATION/TRAINING PROGRAM

## 2024-10-14 PROCEDURE — 1159F MED LIST DOCD IN RCRD: CPT | Mod: CPTII,,, | Performed by: STUDENT IN AN ORGANIZED HEALTH CARE EDUCATION/TRAINING PROGRAM

## 2024-10-14 PROCEDURE — 3075F SYST BP GE 130 - 139MM HG: CPT | Mod: CPTII,,, | Performed by: STUDENT IN AN ORGANIZED HEALTH CARE EDUCATION/TRAINING PROGRAM

## 2024-10-14 PROCEDURE — 81001 URINALYSIS AUTO W/SCOPE: CPT

## 2024-10-14 PROCEDURE — 93010 ELECTROCARDIOGRAM REPORT: CPT | Mod: ,,, | Performed by: STUDENT IN AN ORGANIZED HEALTH CARE EDUCATION/TRAINING PROGRAM

## 2024-10-14 PROCEDURE — 4010F ACE/ARB THERAPY RXD/TAKEN: CPT | Mod: CPTII,,, | Performed by: STUDENT IN AN ORGANIZED HEALTH CARE EDUCATION/TRAINING PROGRAM

## 2024-10-14 PROCEDURE — 3008F BODY MASS INDEX DOCD: CPT | Mod: CPTII,,, | Performed by: STUDENT IN AN ORGANIZED HEALTH CARE EDUCATION/TRAINING PROGRAM

## 2024-10-14 PROCEDURE — 99024 POSTOP FOLLOW-UP VISIT: CPT | Mod: ,,,

## 2024-10-14 PROCEDURE — 4010F ACE/ARB THERAPY RXD/TAKEN: CPT | Mod: CPTII,,,

## 2024-10-14 PROCEDURE — 81015 MICROSCOPIC EXAM OF URINE: CPT

## 2024-10-14 PROCEDURE — 1160F RVW MEDS BY RX/DR IN RCRD: CPT | Mod: CPTII,,, | Performed by: STUDENT IN AN ORGANIZED HEALTH CARE EDUCATION/TRAINING PROGRAM

## 2024-10-14 PROCEDURE — 99215 OFFICE O/P EST HI 40 MIN: CPT | Mod: ,,, | Performed by: STUDENT IN AN ORGANIZED HEALTH CARE EDUCATION/TRAINING PROGRAM

## 2024-10-14 RX ORDER — BLOOD-GLUCOSE METER
EACH MISCELLANEOUS
COMMUNITY
Start: 2024-10-04

## 2024-10-14 RX ORDER — DICYCLOMINE HYDROCHLORIDE 20 MG/1
20 TABLET ORAL EVERY 6 HOURS PRN
COMMUNITY
Start: 2024-05-24

## 2024-10-14 RX ORDER — ONDANSETRON 4 MG/1
4 TABLET, ORALLY DISINTEGRATING ORAL EVERY 6 HOURS PRN
COMMUNITY
Start: 2024-08-01

## 2024-10-14 RX ORDER — GLUCOSAM/CHON-MSM1/C/MANG/BOSW 500-416.6
TABLET ORAL
COMMUNITY
Start: 2024-10-03

## 2024-10-14 RX ORDER — DULOXETIN HYDROCHLORIDE 30 MG/1
30 CAPSULE, DELAYED RELEASE ORAL
Status: ON HOLD | COMMUNITY
Start: 2024-10-02 | End: 2024-10-18 | Stop reason: HOSPADM

## 2024-10-14 RX ORDER — IBUPROFEN 800 MG/1
800 TABLET ORAL 3 TIMES DAILY
COMMUNITY
Start: 2024-04-24

## 2024-10-14 RX ORDER — HYDROXYZINE PAMOATE 25 MG/1
25-50 CAPSULE ORAL
Status: ON HOLD | COMMUNITY
Start: 2024-09-04 | End: 2024-10-18 | Stop reason: HOSPADM

## 2024-10-14 RX ORDER — CALCIUM CITRATE/VITAMIN D3 200MG-6.25
TABLET ORAL 4 TIMES DAILY
COMMUNITY
Start: 2024-10-03

## 2024-10-14 NOTE — H&P (VIEW-ONLY)
[facility]  History & Physical  Neurosurgery      Luis Gomes   [id number]   1990     SUBJECTIVE:     Chief Complaint:  Seizures    History of Present Illness:   Patient is a 34 y.o. left handed male is seen today for neurosurgical evaluation.  He was hospitalized at the end of September after experiencing first-time seizure.  He has a history of morbid obesity, sleep apnea and bipolar disorder.  Neuroimaging was obtained for workup of new onset seizures.  It demonstrated a very large intrinsic brain lesion.  He was discharged home on steroids (Dexamethasone 4mg BID) and Keppra.  He experienced ongoing seizures and required increasing Keppra dose.  He has not experienced further seizures since this change was made. He experiences pressure headaches when straining or coughing, but they resolve spontaneously. Denies difficulty with speech or motor deficits.  Endorses occasional blurry vision.  He is here today to discuss surgical intervention.    Past Medical History:   Diagnosis Date    Anxiety     Bipolar disorder 03/04/2022    Brain mass     Depression     Fatty liver     High blood sugar     Insomnia     Posttraumatic stress disorder 03/19/2019    Seizures     Sleep apnea       History reviewed. No pertinent surgical history.     Social History     Tobacco Use    Smoking status: Every Day     Current packs/day: 0.50     Types: Cigarettes    Smokeless tobacco: Never   Substance Use Topics    Alcohol use: Yes     Comment: beer occ      Family History   Problem Relation Name Age of Onset    Mental illness Mother      Mental illness Father      Heart disease Father        Review of patient's allergies indicates:   Allergen Reactions    Tree nut       Current Outpatient Medications   Medication Instructions    busPIRone (BUSPAR) 15 mg, 3 times daily    clonazePAM (KLONOPIN) 1 mg, 2 times daily PRN    dexAMETHasone (DECADRON) 4 mg, Oral, 2 times daily    dicyclomine (BENTYL) 20 mg, Every 6 hours  "PRN    DULoxetine (CYMBALTA) 60 mg, Daily    DULoxetine (CYMBALTA) 30 mg    hydrOXYzine pamoate (VISTARIL) 50 mg, Every 8 hours PRN    hydrOXYzine pamoate (VISTARIL) 25-50 mg    ibuprofen (ADVIL,MOTRIN) 800 mg, 3 times daily    levETIRAcetam (KEPPRA) 1,000 mg, Oral, 2 times daily    lisinopriL-hydrochlorothiazide (PRINZIDE,ZESTORETIC) 20-12.5 mg per tablet 1 tablet, Daily    lurasidone (LATUDA) 80 mg, Daily    ondansetron (ZOFRAN-ODT) 4 mg, Every 6 hours PRN    pantoprazole (PROTONIX) 40 mg, Oral, Daily    traZODone (DESYREL) 100 mg, Nightly PRN    TRUE METRIX GLUCOSE METER Misc USE TO check glucose BEFORE meals AND AT BEDTIME    TRUE METRIX GLUCOSE TEST STRIP Strp 4 times daily    TRUEPLUS LANCETS 30 gauge Misc USE TO check glucose BEFORE meals AND AT BEDTIME      Review of Systems   Constitutional: Negative.    HENT: Negative.     Eyes: Negative.    Respiratory: Negative.     Cardiovascular: Negative.    Gastrointestinal: Negative.    Endocrine: Negative.    Genitourinary: Negative.    Musculoskeletal: Negative.    Skin: Negative.    Allergic/Immunologic: Negative.    Neurological:  Positive for seizures.   Psychiatric/Behavioral:  Positive for behavioral problems, confusion and dysphoric mood. The patient is nervous/anxious.      OBJECTIVE:     Visit Vitals  /86   Pulse 78   Resp 18   Ht 5' 11" (1.803 m)   Wt (!) 178.2 kg (392 lb 12.8 oz)   BMI 54.78 kg/m²      General:  Pleasant, Well-groomed, overweight.    Head:  Normocephalic, atraumatic.    CV:  Neck is supple.  There are no carotid bruits.  Heart has regular rate and rhythm.    Lungs:  Lungs are clear to auscultation bilaterally with non-labored respirations.    Abdomen:  Soft, nontender, nondistended.    Neurological:    Oriented to Person, Place, Time   Speech: normal  Memory, cognition, and affect are appropriate.  Pupils are equal, round, and reactive to light,  Extraocular movements are intact.  Movements of facial expression are intact and " symmetric.  Motor examination reveals 5/5 strength with normal tone and bulk.  Sensation is intact.  Gait is normal.    Imaging:  All pertinent neuroimaging independently reviewed. These findings were discussed in detail with the patient.     MRI brain with and without contrast was completed on September 27, 2024.    Demonstrates large heterogeneously enhancing right temporal lesion causing significant local mass effect on surrounding structures including brainstem.  There seems to be brainstem compromise by this infiltrative process as well as contralateral lesions in cerebellar peduncle.    No convincing CT evidence of malignancy chest, abdomen or pelvis.    DIAGNOSES:       ICD-10-CM ICD-9-CM   1. Preoperative testing  Z01.818 V72.84   2. Brain mass  G93.89 348.89   3. Hypertension, unspecified type  I10 401.9   4. Gastroesophageal reflux disease, unspecified whether esophagitis present  K21.9 530.81     ASSESSMENT/PLAN:     Luis Gomes is a 34-year-old male with history of multiple comorbidities including morbid obesity, sleep apnea and bipolar disorder who presented with first-time seizure 3 weeks ago.  Neuroimaging demonstrated intrinsic, multifocal brain lesion.  Greatest compromise is in the right temporal lobe where there is significant mass effect on surrounding structures.  I have extensively discussed symptoms and imaging findings with the patient and his parents.  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  I have recommended craniotomy for debulking in order to obtain tissue diagnosis and for cytoreduction.  The risks of surgery include, but are not limited to bleeding, vascular injury, stroke, new onset motor, language and visual deficits, infection, CSF leak, need for additional procedures, ongoing seizures, altered mental status, coma, death.  The plan is to proceed tomorrow with right temporal craniotomy for tumor debulking with stealth guidance, ultrasound, neuro monitoring  including subcortical stimulation.  All questions were answered to their satisfaction.    Cole Serna MD  Neurosurgery  [facility]    40 minutes were personally spent on this visit including my review of available records, prior imaging, comprehensive physical and neurologic examination and discussion with the patient.     Disclaimer:  This note is prepared using voice recognition software and as such is likely to have errors despite attempts at proofreading.

## 2024-10-14 NOTE — PRE-PROCEDURE INSTRUCTIONS
"[facility]: Outpatient Surgery  Preprocedure Check-In Instructions     Your arrival time for your surgery or procedure is 9:30 am.   We ask patients to arrive about 2 hours before surgery to allow for enough time to review your health history & medications, start your IV, complete any outstanding labwork or tests, and meet your Anesthesiologist.    Expectations: "Because of inconsistent procedure completion times, an unexpected wait may occur. The Physicians would like you to be here to prepare in the event they run ahead of time. We will make you as comfortable as possible and keep you informed. We apologize in advance if this happens."    You will arrive at [facility], [facility] [place], [place].  Enter through the [place] entrance next to the Emergency Room, and come to the 6th floor to the Outpatient Surgery Department.     Visitory Policy:  You are allowed 2 adult visitors to be with you in the hospital. All hospital visitors should be in good current health.  No small children.     What to Bring:  Please have your ID, insurance cards, and all home medication bottles with you at check in.  Bring your CPAP machine if one is used at home.     Fasting:  Nothing to eat after midnight the night before your procedure. This includes no gum and/or tobacco products. You may have clear liquids limited to: water, gatorade/powerade, sprite, apple juice and/or black coffee up until 2 hours before your arrival time.   Follow your doctor's instructions for taking any medications on the morning of your procedure.  If no instructions for taking medications were given, do not take any medications but bring your medications in their bottles to your procedure check in.     Follow your doctor's preoperative instructions regarding skin prep, bowel prep, bathing, or showering prior to your procedure.  If any special soaps were provided to you, please use according to your doctor's " instructions. If no instructions were given from your doctor, take a good bath or shower with antibacterial soap the night before and the morning of your procedure.  On the morning of procedure, wear loose, comfortable clothing.  No lotions, makeup, perfumes, colognes, deodorant, or jewelry to your procedure.  Removable items (glasses, contact lenses, dentures, retainers, hearing aids) need to be removed for your procedure.  Bring your storage containers for these items if you wear them.     Artificial nails, body jewelry, eyelash extensions, and/or hair extensions with metal clips are not allowed during your surgery.  If you currently wear any of these items, please arrange for them to be removed prior to your arrival to the hospital.     Outpatient or Same Day Surgeries:  Any patients receiving sedation/anesthesia are advised not to drive for 24 hours after their procedure.  We do not allow patients to drive themselves home after discharge.  If you are going home after your procedure, please have someone available to drive you home from the hospital.        You may call the Outpatient Surgery Department at [phone] with any questions or concerns.  We are looking forward to meeting you and taking great care of you for your procedure.  Thank you for choosing [facility] for your surgical needs.     1.92

## 2024-10-14 NOTE — PROGRESS NOTES
[facility]  History & Physical  Neurosurgery      Luis Gomes   [id number]   1990     SUBJECTIVE:     Chief Complaint:  Seizures    History of Present Illness:   Patient is a 34 y.o. left handed male is seen today for neurosurgical evaluation.  He was hospitalized at the end of September after experiencing first-time seizure.  He has a history of morbid obesity, sleep apnea and bipolar disorder.  Neuroimaging was obtained for workup of new onset seizures.  It demonstrated a very large intrinsic brain lesion.  He was discharged home on steroids (Dexamethasone 4mg BID) and Keppra.  He experienced ongoing seizures and required increasing Keppra dose.  He has not experienced further seizures since this change was made. He experiences pressure headaches when straining or coughing, but they resolve spontaneously. Denies difficulty with speech or motor deficits.  Endorses occasional blurry vision.  He is here today to discuss surgical intervention.    Past Medical History:   Diagnosis Date    Anxiety     Bipolar disorder 03/04/2022    Brain mass     Depression     Fatty liver     High blood sugar     Insomnia     Posttraumatic stress disorder 03/19/2019    Seizures     Sleep apnea       History reviewed. No pertinent surgical history.     Social History     Tobacco Use    Smoking status: Every Day     Current packs/day: 0.50     Types: Cigarettes    Smokeless tobacco: Never   Substance Use Topics    Alcohol use: Yes     Comment: beer occ      Family History   Problem Relation Name Age of Onset    Mental illness Mother      Mental illness Father      Heart disease Father        Review of patient's allergies indicates:   Allergen Reactions    Tree nut       Current Outpatient Medications   Medication Instructions    busPIRone (BUSPAR) 15 mg, 3 times daily    clonazePAM (KLONOPIN) 1 mg, 2 times daily PRN    dexAMETHasone (DECADRON) 4 mg, Oral, 2 times daily    dicyclomine (BENTYL) 20 mg, Every 6 hours  "PRN    DULoxetine (CYMBALTA) 60 mg, Daily    DULoxetine (CYMBALTA) 30 mg    hydrOXYzine pamoate (VISTARIL) 50 mg, Every 8 hours PRN    hydrOXYzine pamoate (VISTARIL) 25-50 mg    ibuprofen (ADVIL,MOTRIN) 800 mg, 3 times daily    levETIRAcetam (KEPPRA) 1,000 mg, Oral, 2 times daily    lisinopriL-hydrochlorothiazide (PRINZIDE,ZESTORETIC) 20-12.5 mg per tablet 1 tablet, Daily    lurasidone (LATUDA) 80 mg, Daily    ondansetron (ZOFRAN-ODT) 4 mg, Every 6 hours PRN    pantoprazole (PROTONIX) 40 mg, Oral, Daily    traZODone (DESYREL) 100 mg, Nightly PRN    TRUE METRIX GLUCOSE METER Misc USE TO check glucose BEFORE meals AND AT BEDTIME    TRUE METRIX GLUCOSE TEST STRIP Strp 4 times daily    TRUEPLUS LANCETS 30 gauge Misc USE TO check glucose BEFORE meals AND AT BEDTIME      Review of Systems   Constitutional: Negative.    HENT: Negative.     Eyes: Negative.    Respiratory: Negative.     Cardiovascular: Negative.    Gastrointestinal: Negative.    Endocrine: Negative.    Genitourinary: Negative.    Musculoskeletal: Negative.    Skin: Negative.    Allergic/Immunologic: Negative.    Neurological:  Positive for seizures.   Psychiatric/Behavioral:  Positive for behavioral problems, confusion and dysphoric mood. The patient is nervous/anxious.      OBJECTIVE:     Visit Vitals  /86   Pulse 78   Resp 18   Ht 5' 11" (1.803 m)   Wt (!) 178.2 kg (392 lb 12.8 oz)   BMI 54.78 kg/m²      General:  Pleasant, Well-groomed, overweight.    Head:  Normocephalic, atraumatic.    CV:  Neck is supple.  There are no carotid bruits.  Heart has regular rate and rhythm.    Lungs:  Lungs are clear to auscultation bilaterally with non-labored respirations.    Abdomen:  Soft, nontender, nondistended.    Neurological:    Oriented to Person, Place, Time   Speech: normal  Memory, cognition, and affect are appropriate.  Pupils are equal, round, and reactive to light,  Extraocular movements are intact.  Movements of facial expression are intact and " symmetric.  Motor examination reveals 5/5 strength with normal tone and bulk.  Sensation is intact.  Gait is normal.    Imaging:  All pertinent neuroimaging independently reviewed. These findings were discussed in detail with the patient.     MRI brain with and without contrast was completed on September 27, 2024.    Demonstrates large heterogeneously enhancing right temporal lesion causing significant local mass effect on surrounding structures including brainstem.  There seems to be brainstem compromise by this infiltrative process as well as contralateral lesions in cerebellar peduncle.    No convincing CT evidence of malignancy chest, abdomen or pelvis.    DIAGNOSES:       ICD-10-CM ICD-9-CM   1. Preoperative testing  Z01.818 V72.84   2. Brain mass  G93.89 348.89   3. Hypertension, unspecified type  I10 401.9   4. Gastroesophageal reflux disease, unspecified whether esophagitis present  K21.9 530.81     ASSESSMENT/PLAN:     Luis Gomes is a 34-year-old male with history of multiple comorbidities including morbid obesity, sleep apnea and bipolar disorder who presented with first-time seizure 3 weeks ago.  Neuroimaging demonstrated intrinsic, multifocal brain lesion.  Greatest compromise is in the right temporal lobe where there is significant mass effect on surrounding structures.  I have extensively discussed symptoms and imaging findings with the patient and his parents.  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  I have recommended craniotomy for debulking in order to obtain tissue diagnosis and for cytoreduction.  The risks of surgery include, but are not limited to bleeding, vascular injury, stroke, new onset motor, language and visual deficits, infection, CSF leak, need for additional procedures, ongoing seizures, altered mental status, coma, death.  The plan is to proceed tomorrow with right temporal craniotomy for tumor debulking with stealth guidance, ultrasound, neuro monitoring  including subcortical stimulation.  All questions were answered to their satisfaction.    Cole Serna MD  Neurosurgery  [facility]    40 minutes were personally spent on this visit including my review of available records, prior imaging, comprehensive physical and neurologic examination and discussion with the patient.     Disclaimer:  This note is prepared using voice recognition software and as such is likely to have errors despite attempts at proofreading.

## 2024-10-14 NOTE — PATIENT INSTRUCTIONS
No NSAIDs starting today 10/14/2024.    Please go to the 2nd floor of the [facility] to obtain your preoperative workup at least 7 days prior to surgical intervention.    Someone from our hospital will contact you the day before surgery with your arrival time for the next morning.    The evening before and morning of surgery please wash entire body with anti-bacterial soap (Dial) and surgical area with Hibiclens. Do not contact the face or genitals with Hibiclens.      Nothing to eat or drink after midnight the evening before surgery.    You will be spending the night in the hospital so be sure to pack an overnight bag.    Your incision should remain clean, dry and cover for the first 72 hrs after surgery.  After 72 hrs you are able to shower although we do not want you submerging your incision.  Please pat the area dry following your shower.  Do not apply any type of lotions or ointments to your incision.    Notify the office of any fever greater than 101.5 as well as any redness, drainage, swelling or tenderness here incision.     We will have a 2 week postoperative visit to remove staples/sutures on 10/30/2024 at 0900 with LAURIE Liang.     No lifting greater than 15 lb for 4-6 weeks following surgery.     No driving for 6 months.

## 2024-10-15 ENCOUNTER — HOSPITAL ENCOUNTER (INPATIENT)
Facility: HOSPITAL | Age: 34
LOS: 3 days | Discharge: HOME | DRG: 026 | End: 2024-10-18
Attending: STUDENT IN AN ORGANIZED HEALTH CARE EDUCATION/TRAINING PROGRAM | Admitting: STUDENT IN AN ORGANIZED HEALTH CARE EDUCATION/TRAINING PROGRAM
Payer: MEDICAID

## 2024-10-15 ENCOUNTER — ANESTHESIA (OUTPATIENT)
Dept: SURGERY | Facility: HOSPITAL | Age: 34
End: 2024-10-15
Payer: MEDICAID

## 2024-10-15 DIAGNOSIS — C71.2 MALIGNANT NEOPLASM OF TEMPORAL LOBE OF BRAIN: ICD-10-CM

## 2024-10-15 DIAGNOSIS — G96.89 CNS MASS: Primary | ICD-10-CM

## 2024-10-15 DIAGNOSIS — E11.9 TYPE 2 DIABETES MELLITUS WITHOUT COMPLICATION, WITHOUT LONG-TERM CURRENT USE OF INSULIN: ICD-10-CM

## 2024-10-15 DIAGNOSIS — G93.89 BRAIN MASS: ICD-10-CM

## 2024-10-15 LAB
ANION GAP SERPL CALC-SCNC: 10 MEQ/L
BASOPHILS # BLD AUTO: 0.01 X10(3)/MCL
BASOPHILS NFR BLD AUTO: 0.1 %
BUN SERPL-MCNC: 21 MG/DL (ref 8.9–20.6)
CALCIUM SERPL-MCNC: 7.7 MG/DL (ref 8.4–10.2)
CHLORIDE SERPL-SCNC: 98 MMOL/L (ref 98–107)
CO2 SERPL-SCNC: 24 MMOL/L (ref 22–29)
CREAT SERPL-MCNC: 0.91 MG/DL (ref 0.73–1.18)
CREAT/UREA NIT SERPL: 23
EOSINOPHIL # BLD AUTO: 0 X10(3)/MCL (ref 0–0.9)
EOSINOPHIL NFR BLD AUTO: 0 %
ERYTHROCYTE [DISTWIDTH] IN BLOOD BY AUTOMATED COUNT: 12.8 % (ref 11.5–17)
GFR SERPLBLD CREATININE-BSD FMLA CKD-EPI: >60 ML/MIN/1.73/M2
GLUCOSE SERPL-MCNC: 313 MG/DL (ref 74–100)
GROUP & RH: NORMAL
HCT VFR BLD AUTO: 41.4 % (ref 42–52)
HGB BLD-MCNC: 14.2 G/DL (ref 14–18)
IMM GRANULOCYTES # BLD AUTO: 0.12 X10(3)/MCL (ref 0–0.04)
IMM GRANULOCYTES NFR BLD AUTO: 0.8 %
INDIRECT COOMBS: NORMAL
LYMPHOCYTES # BLD AUTO: 0.76 X10(3)/MCL (ref 0.6–4.6)
LYMPHOCYTES NFR BLD AUTO: 5.3 %
MCH RBC QN AUTO: 32.3 PG (ref 27–31)
MCHC RBC AUTO-ENTMCNC: 34.3 G/DL (ref 33–36)
MCV RBC AUTO: 94.1 FL (ref 80–94)
MONOCYTES # BLD AUTO: 0.76 X10(3)/MCL (ref 0.1–1.3)
MONOCYTES NFR BLD AUTO: 5.3 %
NEUTROPHILS # BLD AUTO: 12.81 X10(3)/MCL (ref 2.1–9.2)
NEUTROPHILS NFR BLD AUTO: 88.5 %
NRBC BLD AUTO-RTO: 0 %
PLATELET # BLD AUTO: 185 X10(3)/MCL (ref 130–400)
PMV BLD AUTO: 9.6 FL (ref 7.4–10.4)
POCT GLUCOSE: 259 MG/DL (ref 70–110)
POCT GLUCOSE: 292 MG/DL (ref 70–110)
POCT GLUCOSE: 298 MG/DL (ref 70–110)
POTASSIUM SERPL-SCNC: 4.9 MMOL/L (ref 3.5–5.1)
RBC # BLD AUTO: 4.4 X10(6)/MCL (ref 4.7–6.1)
SODIUM SERPL-SCNC: 132 MMOL/L (ref 136–145)
SPECIMEN OUTDATE: NORMAL
WBC # BLD AUTO: 14.46 X10(3)/MCL (ref 4.5–11.5)

## 2024-10-15 PROCEDURE — 36415 COLL VENOUS BLD VENIPUNCTURE: CPT | Performed by: STUDENT IN AN ORGANIZED HEALTH CARE EDUCATION/TRAINING PROGRAM

## 2024-10-15 PROCEDURE — 27000221 HC OXYGEN, UP TO 24 HOURS

## 2024-10-15 PROCEDURE — 85025 COMPLETE CBC W/AUTO DIFF WBC: CPT

## 2024-10-15 PROCEDURE — 36000712 HC OR TIME LEV V 1ST 15 MIN: Performed by: STUDENT IN AN ORGANIZED HEALTH CARE EDUCATION/TRAINING PROGRAM

## 2024-10-15 PROCEDURE — 36415 COLL VENOUS BLD VENIPUNCTURE: CPT

## 2024-10-15 PROCEDURE — 71000033 HC RECOVERY, INTIAL HOUR: Performed by: STUDENT IN AN ORGANIZED HEALTH CARE EDUCATION/TRAINING PROGRAM

## 2024-10-15 PROCEDURE — 61510 CRNEC TREPH EXC BRN TUM STTL: CPT | Mod: ,,, | Performed by: STUDENT IN AN ORGANIZED HEALTH CARE EDUCATION/TRAINING PROGRAM

## 2024-10-15 PROCEDURE — 80048 BASIC METABOLIC PNL TOTAL CA: CPT

## 2024-10-15 PROCEDURE — 63600175 PHARM REV CODE 636 W HCPCS: Performed by: NURSE ANESTHETIST, CERTIFIED REGISTERED

## 2024-10-15 PROCEDURE — 63600175 PHARM REV CODE 636 W HCPCS: Performed by: STUDENT IN AN ORGANIZED HEALTH CARE EDUCATION/TRAINING PROGRAM

## 2024-10-15 PROCEDURE — C9248 INJ, CLEVIDIPINE BUTYRATE: HCPCS

## 2024-10-15 PROCEDURE — 69990 MICROSURGERY ADD-ON: CPT | Mod: 59,,, | Performed by: STUDENT IN AN ORGANIZED HEALTH CARE EDUCATION/TRAINING PROGRAM

## 2024-10-15 PROCEDURE — 88331 PATH CONSLTJ SURG 1 BLK 1SPC: CPT

## 2024-10-15 PROCEDURE — 37000009 HC ANESTHESIA EA ADD 15 MINS: Performed by: STUDENT IN AN ORGANIZED HEALTH CARE EDUCATION/TRAINING PROGRAM

## 2024-10-15 PROCEDURE — 25000003 PHARM REV CODE 250: Performed by: STUDENT IN AN ORGANIZED HEALTH CARE EDUCATION/TRAINING PROGRAM

## 2024-10-15 PROCEDURE — C1762 CONN TISS, HUMAN(INC FASCIA): HCPCS | Performed by: STUDENT IN AN ORGANIZED HEALTH CARE EDUCATION/TRAINING PROGRAM

## 2024-10-15 PROCEDURE — 88360 TUMOR IMMUNOHISTOCHEM/MANUAL: CPT

## 2024-10-15 PROCEDURE — 63600175 PHARM REV CODE 636 W HCPCS

## 2024-10-15 PROCEDURE — 61510 CRNEC TREPH EXC BRN TUM STTL: CPT | Mod: AS,,,

## 2024-10-15 PROCEDURE — 86900 BLOOD TYPING SEROLOGIC ABO: CPT | Performed by: STUDENT IN AN ORGANIZED HEALTH CARE EDUCATION/TRAINING PROGRAM

## 2024-10-15 PROCEDURE — 88307 TISSUE EXAM BY PATHOLOGIST: CPT | Performed by: STUDENT IN AN ORGANIZED HEALTH CARE EDUCATION/TRAINING PROGRAM

## 2024-10-15 PROCEDURE — 61781 SCAN PROC CRANIAL INTRA: CPT | Mod: ,,, | Performed by: STUDENT IN AN ORGANIZED HEALTH CARE EDUCATION/TRAINING PROGRAM

## 2024-10-15 PROCEDURE — C1713 ANCHOR/SCREW BN/BN,TIS/BN: HCPCS | Performed by: STUDENT IN AN ORGANIZED HEALTH CARE EDUCATION/TRAINING PROGRAM

## 2024-10-15 PROCEDURE — 25000003 PHARM REV CODE 250: Performed by: ANESTHESIOLOGY

## 2024-10-15 PROCEDURE — 88334 PATH CONSLTJ SURG CYTO XM EA: CPT

## 2024-10-15 PROCEDURE — 00B70ZZ EXCISION OF CEREBRAL HEMISPHERE, OPEN APPROACH: ICD-10-PCS | Performed by: STUDENT IN AN ORGANIZED HEALTH CARE EDUCATION/TRAINING PROGRAM

## 2024-10-15 PROCEDURE — 25000003 PHARM REV CODE 250: Performed by: NURSE ANESTHETIST, CERTIFIED REGISTERED

## 2024-10-15 PROCEDURE — 37000008 HC ANESTHESIA 1ST 15 MINUTES: Performed by: STUDENT IN AN ORGANIZED HEALTH CARE EDUCATION/TRAINING PROGRAM

## 2024-10-15 PROCEDURE — 88342 IMHCHEM/IMCYTCHM 1ST ANTB: CPT

## 2024-10-15 PROCEDURE — 36000713 HC OR TIME LEV V EA ADD 15 MIN: Performed by: STUDENT IN AN ORGANIZED HEALTH CARE EDUCATION/TRAINING PROGRAM

## 2024-10-15 PROCEDURE — 88341 IMHCHEM/IMCYTCHM EA ADD ANTB: CPT

## 2024-10-15 PROCEDURE — 36620 INSERTION CATHETER ARTERY: CPT | Performed by: ANESTHESIOLOGY

## 2024-10-15 PROCEDURE — 63600175 PHARM REV CODE 636 W HCPCS: Performed by: ANESTHESIOLOGY

## 2024-10-15 PROCEDURE — A4216 STERILE WATER/SALINE, 10 ML: HCPCS | Performed by: NURSE ANESTHETIST, CERTIFIED REGISTERED

## 2024-10-15 PROCEDURE — 86850 RBC ANTIBODY SCREEN: CPT | Performed by: STUDENT IN AN ORGANIZED HEALTH CARE EDUCATION/TRAINING PROGRAM

## 2024-10-15 PROCEDURE — 20000000 HC ICU ROOM

## 2024-10-15 PROCEDURE — 27201423 OPTIME MED/SURG SUP & DEVICES STERILE SUPPLY: Performed by: STUDENT IN AN ORGANIZED HEALTH CARE EDUCATION/TRAINING PROGRAM

## 2024-10-15 PROCEDURE — 86901 BLOOD TYPING SEROLOGIC RH(D): CPT | Performed by: STUDENT IN AN ORGANIZED HEALTH CARE EDUCATION/TRAINING PROGRAM

## 2024-10-15 PROCEDURE — 25000003 PHARM REV CODE 250

## 2024-10-15 DEVICE — COVER UN3 BURRHOLE 14MM TAB: Type: IMPLANTABLE DEVICE | Site: CRANIAL | Status: FUNCTIONAL

## 2024-10-15 DEVICE — SCREW UN3 AXS SD 1.5X4MM: Type: IMPLANTABLE DEVICE | Site: CRANIAL | Status: FUNCTIONAL

## 2024-10-15 DEVICE — PLATE BONE 2X2 HOLE SM BOX: Type: IMPLANTABLE DEVICE | Site: CRANIAL | Status: FUNCTIONAL

## 2024-10-15 DEVICE — COLLAGEN DURAL REGENERATION MEMBRANE 3IN X 3IN (7.5CM X 7.5CM)
Type: IMPLANTABLE DEVICE | Site: CRANIAL | Status: FUNCTIONAL
Brand: DURAMATRIX-ONLAY PLUS

## 2024-10-15 RX ORDER — GLUCAGON 1 MG
1 KIT INJECTION
Status: DISCONTINUED | OUTPATIENT
Start: 2024-10-15 | End: 2024-10-18 | Stop reason: HOSPADM

## 2024-10-15 RX ORDER — MIDAZOLAM HYDROCHLORIDE 1 MG/ML
1 INJECTION INTRAMUSCULAR; INTRAVENOUS ONCE
Status: DISCONTINUED | OUTPATIENT
Start: 2024-10-15 | End: 2024-10-15 | Stop reason: HOSPADM

## 2024-10-15 RX ORDER — FENTANYL CITRATE 50 UG/ML
INJECTION, SOLUTION INTRAMUSCULAR; INTRAVENOUS
Status: DISCONTINUED | OUTPATIENT
Start: 2024-10-15 | End: 2024-10-15

## 2024-10-15 RX ORDER — ONDANSETRON HYDROCHLORIDE 2 MG/ML
INJECTION, SOLUTION INTRAMUSCULAR; INTRAVENOUS
Status: DISCONTINUED | OUTPATIENT
Start: 2024-10-15 | End: 2024-10-15

## 2024-10-15 RX ORDER — CEFAZOLIN SODIUM 2 G/50ML
2 SOLUTION INTRAVENOUS
Status: COMPLETED | OUTPATIENT
Start: 2024-10-15 | End: 2024-10-16

## 2024-10-15 RX ORDER — LURASIDONE HYDROCHLORIDE 40 MG/1
80 TABLET, FILM COATED ORAL DAILY
Status: DISCONTINUED | OUTPATIENT
Start: 2024-10-16 | End: 2024-10-18 | Stop reason: HOSPADM

## 2024-10-15 RX ORDER — LIDOCAINE HYDROCHLORIDE 10 MG/ML
1 INJECTION, SOLUTION EPIDURAL; INFILTRATION; INTRACAUDAL; PERINEURAL ONCE
Status: DISCONTINUED | OUTPATIENT
Start: 2024-10-15 | End: 2024-10-15 | Stop reason: HOSPADM

## 2024-10-15 RX ORDER — HYDROCODONE BITARTRATE AND ACETAMINOPHEN 10; 325 MG/1; MG/1
1 TABLET ORAL EVERY 4 HOURS PRN
Status: DISCONTINUED | OUTPATIENT
Start: 2024-10-15 | End: 2024-10-18 | Stop reason: HOSPADM

## 2024-10-15 RX ORDER — LEVETIRACETAM 500 MG/1
1500 TABLET ORAL 2 TIMES DAILY
Status: DISCONTINUED | OUTPATIENT
Start: 2024-10-15 | End: 2024-10-18 | Stop reason: HOSPADM

## 2024-10-15 RX ORDER — PROCHLORPERAZINE EDISYLATE 5 MG/ML
5 INJECTION INTRAMUSCULAR; INTRAVENOUS EVERY 6 HOURS PRN
Status: DISCONTINUED | OUTPATIENT
Start: 2024-10-15 | End: 2024-10-18 | Stop reason: HOSPADM

## 2024-10-15 RX ORDER — IBUPROFEN 200 MG
24 TABLET ORAL
Status: DISCONTINUED | OUTPATIENT
Start: 2024-10-15 | End: 2024-10-18 | Stop reason: HOSPADM

## 2024-10-15 RX ORDER — MIDAZOLAM HYDROCHLORIDE 1 MG/ML
INJECTION INTRAMUSCULAR; INTRAVENOUS
Status: DISCONTINUED | OUTPATIENT
Start: 2024-10-15 | End: 2024-10-15

## 2024-10-15 RX ORDER — PHENYLEPHRINE HYDROCHLORIDE 10 MG/ML
INJECTION INTRAVENOUS
Status: DISCONTINUED | OUTPATIENT
Start: 2024-10-15 | End: 2024-10-15

## 2024-10-15 RX ORDER — FAMOTIDINE 10 MG/ML
20 INJECTION INTRAVENOUS ONCE
Status: COMPLETED | OUTPATIENT
Start: 2024-10-15 | End: 2024-10-15

## 2024-10-15 RX ORDER — LIDOCAINE HYDROCHLORIDE 20 MG/ML
INJECTION, SOLUTION EPIDURAL; INFILTRATION; INTRACAUDAL; PERINEURAL
Status: DISCONTINUED | OUTPATIENT
Start: 2024-10-15 | End: 2024-10-15

## 2024-10-15 RX ORDER — SUCCINYLCHOLINE CHLORIDE 20 MG/ML
INJECTION INTRAMUSCULAR; INTRAVENOUS
Status: DISCONTINUED | OUTPATIENT
Start: 2024-10-15 | End: 2024-10-15

## 2024-10-15 RX ORDER — ONDANSETRON HYDROCHLORIDE 2 MG/ML
4 INJECTION, SOLUTION INTRAVENOUS EVERY 6 HOURS PRN
Status: DISCONTINUED | OUTPATIENT
Start: 2024-10-15 | End: 2024-10-18 | Stop reason: HOSPADM

## 2024-10-15 RX ORDER — HYDRALAZINE HYDROCHLORIDE 20 MG/ML
10 INJECTION INTRAMUSCULAR; INTRAVENOUS ONCE
Status: DISCONTINUED | OUTPATIENT
Start: 2024-10-15 | End: 2024-10-15 | Stop reason: HOSPADM

## 2024-10-15 RX ORDER — MANNITOL 250 MG/ML
INJECTION, SOLUTION INTRAVENOUS
Status: DISCONTINUED | OUTPATIENT
Start: 2024-10-15 | End: 2024-10-15

## 2024-10-15 RX ORDER — INSULIN ASPART 100 [IU]/ML
0-10 INJECTION, SOLUTION INTRAVENOUS; SUBCUTANEOUS
Status: DISCONTINUED | OUTPATIENT
Start: 2024-10-15 | End: 2024-10-18 | Stop reason: HOSPADM

## 2024-10-15 RX ORDER — BACITRACIN 500 [USP'U]/G
OINTMENT TOPICAL
Status: DISCONTINUED | OUTPATIENT
Start: 2024-10-15 | End: 2024-10-15 | Stop reason: HOSPADM

## 2024-10-15 RX ORDER — LEVETIRACETAM 500 MG/5ML
INJECTION, SOLUTION, CONCENTRATE INTRAVENOUS
Status: DISCONTINUED | OUTPATIENT
Start: 2024-10-15 | End: 2024-10-15

## 2024-10-15 RX ORDER — HYDROCODONE BITARTRATE AND ACETAMINOPHEN 5; 325 MG/1; MG/1
1 TABLET ORAL EVERY 4 HOURS PRN
Status: DISCONTINUED | OUTPATIENT
Start: 2024-10-15 | End: 2024-10-18 | Stop reason: HOSPADM

## 2024-10-15 RX ORDER — HYDROCHLOROTHIAZIDE 12.5 MG/1
12.5 TABLET ORAL DAILY
Status: DISCONTINUED | OUTPATIENT
Start: 2024-10-16 | End: 2024-10-18 | Stop reason: HOSPADM

## 2024-10-15 RX ORDER — PROPOFOL 10 MG/ML
VIAL (ML) INTRAVENOUS
Status: DISCONTINUED | OUTPATIENT
Start: 2024-10-15 | End: 2024-10-15

## 2024-10-15 RX ORDER — ACETAMINOPHEN 325 MG/1
650 TABLET ORAL EVERY 6 HOURS PRN
Status: DISCONTINUED | OUTPATIENT
Start: 2024-10-15 | End: 2024-10-18 | Stop reason: HOSPADM

## 2024-10-15 RX ORDER — LABETALOL HYDROCHLORIDE 5 MG/ML
10 INJECTION, SOLUTION INTRAVENOUS EVERY 4 HOURS PRN
Status: DISCONTINUED | OUTPATIENT
Start: 2024-10-15 | End: 2024-10-18 | Stop reason: HOSPADM

## 2024-10-15 RX ORDER — TRAZODONE HYDROCHLORIDE 100 MG/1
100 TABLET ORAL NIGHTLY PRN
Status: DISCONTINUED | OUTPATIENT
Start: 2024-10-15 | End: 2024-10-18 | Stop reason: HOSPADM

## 2024-10-15 RX ORDER — HYDRALAZINE HYDROCHLORIDE 20 MG/ML
INJECTION INTRAMUSCULAR; INTRAVENOUS
Status: COMPLETED
Start: 2024-10-15 | End: 2024-10-15

## 2024-10-15 RX ORDER — LISINOPRIL 20 MG/1
20 TABLET ORAL DAILY
Status: DISCONTINUED | OUTPATIENT
Start: 2024-10-16 | End: 2024-10-18 | Stop reason: HOSPADM

## 2024-10-15 RX ORDER — PROPOFOL 10 MG/ML
VIAL (ML) INTRAVENOUS CONTINUOUS PRN
Status: DISCONTINUED | OUTPATIENT
Start: 2024-10-15 | End: 2024-10-15

## 2024-10-15 RX ORDER — PANTOPRAZOLE SODIUM 40 MG/1
40 TABLET, DELAYED RELEASE ORAL DAILY
Status: DISCONTINUED | OUTPATIENT
Start: 2024-10-16 | End: 2024-10-18 | Stop reason: HOSPADM

## 2024-10-15 RX ORDER — HYDRALAZINE HYDROCHLORIDE 20 MG/ML
10 INJECTION INTRAMUSCULAR; INTRAVENOUS EVERY 6 HOURS PRN
Status: DISCONTINUED | OUTPATIENT
Start: 2024-10-15 | End: 2024-10-15

## 2024-10-15 RX ORDER — DEXAMETHASONE 4 MG/1
4 TABLET ORAL EVERY 8 HOURS
Status: DISCONTINUED | OUTPATIENT
Start: 2024-10-15 | End: 2024-10-18

## 2024-10-15 RX ORDER — LIDOCAINE HYDROCHLORIDE AND EPINEPHRINE 5; 5 MG/ML; UG/ML
INJECTION, SOLUTION INFILTRATION; PERINEURAL
Status: DISCONTINUED | OUTPATIENT
Start: 2024-10-15 | End: 2024-10-15 | Stop reason: HOSPADM

## 2024-10-15 RX ORDER — CEFAZOLIN SODIUM 1 G/3ML
INJECTION, POWDER, FOR SOLUTION INTRAMUSCULAR; INTRAVENOUS
Status: DISCONTINUED | OUTPATIENT
Start: 2024-10-15 | End: 2024-10-15 | Stop reason: HOSPADM

## 2024-10-15 RX ORDER — LISINOPRIL AND HYDROCHLOROTHIAZIDE 12.5; 2 MG/1; MG/1
1 TABLET ORAL DAILY
Status: DISCONTINUED | OUTPATIENT
Start: 2024-10-16 | End: 2024-10-15

## 2024-10-15 RX ORDER — HYDRALAZINE HYDROCHLORIDE 20 MG/ML
10 INJECTION INTRAMUSCULAR; INTRAVENOUS
Status: DISCONTINUED | OUTPATIENT
Start: 2024-10-15 | End: 2024-10-15

## 2024-10-15 RX ORDER — MUPIROCIN 20 MG/G
OINTMENT TOPICAL 2 TIMES DAILY
Status: COMPLETED | OUTPATIENT
Start: 2024-10-15 | End: 2024-10-17

## 2024-10-15 RX ORDER — IBUPROFEN 200 MG
16 TABLET ORAL
Status: DISCONTINUED | OUTPATIENT
Start: 2024-10-15 | End: 2024-10-18 | Stop reason: HOSPADM

## 2024-10-15 RX ORDER — ACETAMINOPHEN 10 MG/ML
INJECTION, SOLUTION INTRAVENOUS
Status: DISCONTINUED | OUTPATIENT
Start: 2024-10-15 | End: 2024-10-15

## 2024-10-15 RX ORDER — HYDROXYZINE PAMOATE 50 MG/1
50 CAPSULE ORAL EVERY 8 HOURS PRN
Status: DISCONTINUED | OUTPATIENT
Start: 2024-10-15 | End: 2024-10-18 | Stop reason: HOSPADM

## 2024-10-15 RX ORDER — HYDRALAZINE HYDROCHLORIDE 20 MG/ML
INJECTION INTRAMUSCULAR; INTRAVENOUS
Status: DISCONTINUED | OUTPATIENT
Start: 2024-10-15 | End: 2024-10-15

## 2024-10-15 RX ORDER — SODIUM CHLORIDE 9 MG/ML
INJECTION, SOLUTION INTRAVENOUS CONTINUOUS
Status: DISCONTINUED | OUTPATIENT
Start: 2024-10-15 | End: 2024-10-17

## 2024-10-15 RX ORDER — CLONAZEPAM 1 MG/1
1 TABLET ORAL 2 TIMES DAILY PRN
Status: DISCONTINUED | OUTPATIENT
Start: 2024-10-15 | End: 2024-10-18 | Stop reason: HOSPADM

## 2024-10-15 RX ORDER — BUSPIRONE HYDROCHLORIDE 5 MG/1
15 TABLET ORAL 3 TIMES DAILY
Status: DISCONTINUED | OUTPATIENT
Start: 2024-10-15 | End: 2024-10-18 | Stop reason: HOSPADM

## 2024-10-15 RX ORDER — DULOXETIN HYDROCHLORIDE 30 MG/1
60 CAPSULE, DELAYED RELEASE ORAL DAILY
Status: DISCONTINUED | OUTPATIENT
Start: 2024-10-16 | End: 2024-10-18 | Stop reason: HOSPADM

## 2024-10-15 RX ORDER — HYDRALAZINE HYDROCHLORIDE 20 MG/ML
10 INJECTION INTRAMUSCULAR; INTRAVENOUS
Status: DISCONTINUED | OUTPATIENT
Start: 2024-10-15 | End: 2024-10-18 | Stop reason: HOSPADM

## 2024-10-15 RX ORDER — DICYCLOMINE HYDROCHLORIDE 20 MG/1
20 TABLET ORAL EVERY 6 HOURS PRN
Status: DISCONTINUED | OUTPATIENT
Start: 2024-10-15 | End: 2024-10-18 | Stop reason: HOSPADM

## 2024-10-15 RX ORDER — ROCURONIUM BROMIDE 10 MG/ML
INJECTION, SOLUTION INTRAVENOUS
Status: DISCONTINUED | OUTPATIENT
Start: 2024-10-15 | End: 2024-10-15

## 2024-10-15 RX ORDER — DEXAMETHASONE SODIUM PHOSPHATE 4 MG/ML
INJECTION, SOLUTION INTRA-ARTICULAR; INTRALESIONAL; INTRAMUSCULAR; INTRAVENOUS; SOFT TISSUE
Status: DISCONTINUED | OUTPATIENT
Start: 2024-10-15 | End: 2024-10-15

## 2024-10-15 RX ADMIN — PHENYLEPHRINE HYDROCHLORIDE 100 MCG: 10 INJECTION INTRAVENOUS at 04:10

## 2024-10-15 RX ADMIN — ACETAMINOPHEN 1000 MG: 10 INJECTION, SOLUTION INTRAVENOUS at 04:10

## 2024-10-15 RX ADMIN — SODIUM CHLORIDE: 9 INJECTION, SOLUTION INTRAVENOUS at 06:10

## 2024-10-15 RX ADMIN — ONDANSETRON 4 MG: 2 INJECTION INTRAMUSCULAR; INTRAVENOUS at 03:10

## 2024-10-15 RX ADMIN — LEVETIRACETAM 1500 MG: 500 TABLET, FILM COATED ORAL at 08:10

## 2024-10-15 RX ADMIN — ROCURONIUM BROMIDE 10 MG: 10 SOLUTION INTRAVENOUS at 11:10

## 2024-10-15 RX ADMIN — CEFAZOLIN SODIUM 2 G: 2 SOLUTION INTRAVENOUS at 10:10

## 2024-10-15 RX ADMIN — PROPOFOL 50 MG: 10 INJECTION, EMULSION INTRAVENOUS at 12:10

## 2024-10-15 RX ADMIN — SODIUM CHLORIDE, SODIUM GLUCONATE, SODIUM ACETATE, POTASSIUM CHLORIDE AND MAGNESIUM CHLORIDE: 526; 502; 368; 37; 30 INJECTION, SOLUTION INTRAVENOUS at 11:10

## 2024-10-15 RX ADMIN — DEXAMETHASONE 4 MG: 4 TABLET ORAL at 09:10

## 2024-10-15 RX ADMIN — DEXAMETHASONE SODIUM PHOSPHATE 8 MG: 4 INJECTION, SOLUTION INTRA-ARTICULAR; INTRALESIONAL; INTRAMUSCULAR; INTRAVENOUS; SOFT TISSUE at 11:10

## 2024-10-15 RX ADMIN — FENTANYL CITRATE 25 MCG: 50 INJECTION, SOLUTION INTRAMUSCULAR; INTRAVENOUS at 12:10

## 2024-10-15 RX ADMIN — MIDAZOLAM HYDROCHLORIDE 2 MG: 1 INJECTION, SOLUTION INTRAMUSCULAR; INTRAVENOUS at 11:10

## 2024-10-15 RX ADMIN — CLEVIPIDINE 22 MG/HR: 0.5 EMULSION INTRAVENOUS at 09:10

## 2024-10-15 RX ADMIN — SODIUM CHLORIDE 0.05 MCG/KG/MIN: 9 INJECTION, SOLUTION INTRAVENOUS at 11:10

## 2024-10-15 RX ADMIN — CLEVIPIDINE 32 MG/HR: 0.5 EMULSION INTRAVENOUS at 11:10

## 2024-10-15 RX ADMIN — MANNITOL 100 G: 12.5 INJECTION, SOLUTION INTRAVENOUS at 12:10

## 2024-10-15 RX ADMIN — HYDRALAZINE HYDROCHLORIDE 10 MG: 20 INJECTION INTRAMUSCULAR; INTRAVENOUS at 10:10

## 2024-10-15 RX ADMIN — CLEVIPIDINE 2 MG/HR: 0.5 EMULSION INTRAVENOUS at 08:10

## 2024-10-15 RX ADMIN — LEVETIRACETAM 1000 MG: 100 INJECTION, SOLUTION INTRAVENOUS at 12:10

## 2024-10-15 RX ADMIN — LABETALOL HYDROCHLORIDE 10 MG: 5 INJECTION, SOLUTION INTRAVENOUS at 11:10

## 2024-10-15 RX ADMIN — INSULIN HUMAN 5 UNITS: 100 INJECTION, SOLUTION PARENTERAL at 10:10

## 2024-10-15 RX ADMIN — LIDOCAINE HYDROCHLORIDE 80 MG: 20 INJECTION, SOLUTION INTRAVENOUS at 11:10

## 2024-10-15 RX ADMIN — FAMOTIDINE 20 MG: 10 INJECTION, SOLUTION INTRAVENOUS at 10:10

## 2024-10-15 RX ADMIN — PHENYLEPHRINE HYDROCHLORIDE 100 MCG: 10 INJECTION INTRAVENOUS at 03:10

## 2024-10-15 RX ADMIN — HYDROCODONE BITARTRATE AND ACETAMINOPHEN 1 TABLET: 10; 325 TABLET ORAL at 08:10

## 2024-10-15 RX ADMIN — DEXTROSE MONOHYDRATE 3 G: 5 INJECTION INTRAVENOUS at 03:10

## 2024-10-15 RX ADMIN — SUCCINYLCHOLINE CHLORIDE 200 MG: 20 INJECTION, SOLUTION INTRAMUSCULAR; INTRAVENOUS at 11:10

## 2024-10-15 RX ADMIN — SODIUM CHLORIDE, SODIUM GLUCONATE, SODIUM ACETATE, POTASSIUM CHLORIDE AND MAGNESIUM CHLORIDE: 526; 502; 368; 37; 30 INJECTION, SOLUTION INTRAVENOUS at 04:10

## 2024-10-15 RX ADMIN — HYDRALAZINE HYDROCHLORIDE 10 MG: 20 INJECTION INTRAMUSCULAR; INTRAVENOUS at 12:10

## 2024-10-15 RX ADMIN — BUSPIRONE HYDROCHLORIDE 15 MG: 5 TABLET ORAL at 08:10

## 2024-10-15 RX ADMIN — PROPOFOL 200 MG: 10 INJECTION, EMULSION INTRAVENOUS at 11:10

## 2024-10-15 RX ADMIN — INSULIN ASPART 3 UNITS: 100 INJECTION, SOLUTION INTRAVENOUS; SUBCUTANEOUS at 08:10

## 2024-10-15 RX ADMIN — PROPOFOL 150 MG: 10 INJECTION, EMULSION INTRAVENOUS at 01:10

## 2024-10-15 RX ADMIN — HYDRALAZINE HYDROCHLORIDE 10 MG: 20 INJECTION INTRAMUSCULAR; INTRAVENOUS at 06:10

## 2024-10-15 RX ADMIN — DEXMEDETOMIDINE 0.4 MCG/KG/HR: 200 INJECTION, SOLUTION INTRAVENOUS at 11:10

## 2024-10-15 RX ADMIN — MUPIROCIN: 20 OINTMENT TOPICAL at 08:10

## 2024-10-15 RX ADMIN — MANNITOL 100 G: 12.5 INJECTION, SOLUTION INTRAVENOUS at 01:10

## 2024-10-15 RX ADMIN — PROPOFOL 50 MCG/KG/MIN: 10 INJECTION, EMULSION INTRAVENOUS at 11:10

## 2024-10-15 RX ADMIN — FENTANYL CITRATE 50 MCG: 50 INJECTION, SOLUTION INTRAMUSCULAR; INTRAVENOUS at 11:10

## 2024-10-15 RX ADMIN — CLEVIPIDINE 32 MG/HR: 0.5 EMULSION INTRAVENOUS at 10:10

## 2024-10-15 RX ADMIN — DEXTROSE MONOHYDRATE 3 G: 5 INJECTION INTRAVENOUS at 11:10

## 2024-10-15 NOTE — TRANSFER OF CARE
"Anesthesia Transfer of Care Note    Patient: Luis Gomes    Procedure(s) Performed: Procedure(s) (LRB):  CRANIOTOMY, WITH NEOPLASM EXCISION USING COMPUTER-ASSISTED NAVIGATION (Right)    Patient location: PACU    Anesthesia Type: general    Transport from OR: Transported from OR on room air with adequate spontaneous ventilation    Post pain: pain needs to be addressed    Post assessment: no apparent anesthetic complications and tolerated procedure well    Post vital signs: stable    Level of consciousness: responds to stimulation    Nausea/Vomiting: no nausea/vomiting    Complications: none    Transfer of care protocol was followed    Last vitals: Visit Vitals  /69   Pulse 96   Temp 36.8 °C (98.2 °F)   Resp (!) 38   Ht 5' 11" (1.803 m)   Wt (!) 167.7 kg (369 lb 11.4 oz)   SpO2 96%   BMI 51.56 kg/m²     "

## 2024-10-15 NOTE — BRIEF OP NOTE
[facility] - Periop Services  Brief Operative Note    SUMMARY     Surgery Date: 10/15/2024     Surgeons and Role:     * Cole Smith MD - Primary    Assisting Surgeon: None    Pre-op Diagnosis:  Brain mass [G93.89]    Post-op Diagnosis:  Post-Op Diagnosis Codes:     * Brain mass [G93.89]    Procedure(s) (LRB):  CRANIOTOMY, WITH NEOPLASM EXCISION USING COMPUTER-ASSISTED NAVIGATION (Right)    Right temporal craniotomy for tumor excision    Anesthesia: General    Implants:  Implant Name Type Inv. Item Serial No.  Lot No. LRB No. Used Action   COVER UN3 BURRHOLE 14MM TAB - [id number]  COVER UN3 BURRHOLE 14MM TAB  [employer].  Right 3 Implanted   PLATE BONE 2X2 HOLE SM BOX - [id number]  PLATE BONE 2X2 HOLE SM BOX  [employer].  Right 1 Implanted   SCREW UN3 AXS SD 1.5X4MM - SN/A  SCREW UN3 AXS SD 1.5X4MM N/A [employer]. N/A Right 11 Implanted   DURA MATRIX ONLAY PLUS 3X3 - [id number]  DURA MATRIX ONLAY PLUS 3X3  [employer]. [id number] Right 1 Implanted       Operative Findings:   Excision of right temporal tumor.   Stable neuromonitoring.  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     Estimated Blood Loss: 500mL    Estimated Blood Loss has been documented.         Specimens:   Specimen (24h ago, onward)       Start     Ordered    10/15/24 1613  Specimen to Pathology  RELEASE UPON ORDERING        References:    Click here for ordering Quick Tip   Question:  Release to patient  Answer:  Immediate    10/15/24 1613    10/15/24 1332  Specimen to Pathology  RELEASE UPON ORDERING        References:    Click here for ordering Quick Tip   Question:  Release to patient  Answer:  Immediate    10/15/24 1332                    [id number]      Plan:  - PACU to ICU  - Neuro checks Q1H  - SBP<140/90  - Ancef 2g x 24 hours  - Keppra 1500mg BID  - Pain control  - Decadron 4mg Q8H  - Keep muir catheter in  - Hemovac drain to full suction  - Advance diet as  tolerated  - PT, OT  - SCDs for DVT prophylaxis  - CT head without contrast today at 1830.       Azul Del Toro, [facility]-BC  Neurosurgery  [facility]

## 2024-10-15 NOTE — ANESTHESIA PROCEDURE NOTES
Arterial    Diagnosis: brain mass    Patient location during procedure: pre-op  Timeout: 10/15/2024 10:55 AM  Procedure end time: 10/15/2024 11:00 AM    Staffing  Authorizing Provider: Tyrel Zepeda DO  Performing Provider: Tyrel Zepeda DO    Staffing  Performed by: Tyrel Zepeda DO  Authorized by: Tyrel Zepeda DO    Anesthesiologist was present at the time of the procedure.    Preanesthetic Checklist  Completed: patient identified, IV checked, site marked, risks and benefits discussed, surgical consent, monitors and equipment checked, pre-op evaluation, timeout performed and anesthesia consent givenArterial  Skin Prep: chlorhexidine gluconate  Local Infiltration: lidocaine  Orientation: right  Location: radial    Catheter Size: 20 G  Catheter placement by Ultrasound guidance. Heme positive aspiration all ports.   Vessel Caliber: medium, patent  Vascular Doppler:  not done  Needle advanced into vessel with real time Ultrasound guidance.  Guidewire confirmed in vessel.Insertion Attempts: 1  Assessment  Dressing: secured with tape and tegaderm  Patient: Tolerated well

## 2024-10-15 NOTE — H&P
[facility] - ICU  Pulmonary Critical Care Note    Patient Name: Luis Gomes  MRN: [id number]  Admission Date: 10/15/2024  Hospital Length of Stay: 0 days  Code Status: Prior  Attending Provider: Cole Smith MD  Primary Care Provider: Clemente Huertas FNP     Subjective:     HPI:   Patient is a 34-year-old male with past medical history of hypertension, hyperlipidemia, depression, anxiety, bipolar disorder, seizure disorder, obesity class 3, JANES noncompliant with CPAP, who is being admitted to ICU following craniotomy on 10/15/2024.  Scheduled routine craniotomy for 1.8 x 1.2 cm left cerebellar mass of unknown etiology.  Patient initially presented back in September 27, 2024 for new onset seizures and MRI revealing said mask in addition to numerous right temporal lobe enhancing linear and nodular lesions.  Patient arrives to ICU room  extubated and on nasal cannula.    Hospital Course/Significant events:  10/15/2024 admitted to ICU following craniotomy    24 Hour Interval History:  Pending    Past Medical History:   Diagnosis Date    Anxiety     Bipolar disorder 03/04/2022    Brain mass     Depression     Fatty liver     High blood sugar     Insomnia     Posttraumatic stress disorder 03/19/2019    Seizures     Sleep apnea        History reviewed. No pertinent surgical history.    Social History     Socioeconomic History    Marital status: Single   Tobacco Use    Smoking status: Every Day     Current packs/day: 0.50     Types: Cigarettes    Smokeless tobacco: Never   Substance and Sexual Activity    Alcohol use: Yes     Comment: beer occ    Drug use: Yes     Types: Marijuana     Comment: medical marijuana     Social Drivers of Health     Financial Resource Strain: Low Risk  (9/27/2024)    Overall Financial Resource Strain (CARDIA)     Difficulty of Paying Living Expenses: Not hard at all   Food Insecurity: No Food Insecurity (9/27/2024)    Hunger Vital Sign     Worried About Running Out of  Food in the Last Year: Never true     Ran Out of Food in the Last Year: Never true   Transportation Needs: No Transportation Needs (9/27/2024)    TRANSPORTATION NEEDS     Transportation : No   Physical Activity: Inactive (9/27/2024)    Exercise Vital Sign     Days of Exercise per Week: 0 days     Minutes of Exercise per Session: 0 min   Stress: Stress Concern Present (9/27/2024)    [employer] - Occupational Stress Questionnaire     Feeling of Stress : To some extent   Housing Stability: Low Risk  (9/27/2024)    Housing Stability Vital Sign     Unable to Pay for Housing in the Last Year: No     Homeless in the Last Year: No           Current Outpatient Medications   Medication Instructions    busPIRone (BUSPAR) 15 mg, 3 times daily    clonazePAM (KLONOPIN) 1 mg, 2 times daily PRN    dexAMETHasone (DECADRON) 4 mg, Oral, 2 times daily    dicyclomine (BENTYL) 20 mg, Every 6 hours PRN    DULoxetine (CYMBALTA) 60 mg, Daily    DULoxetine (CYMBALTA) 30 mg    hydrOXYzine pamoate (VISTARIL) 50 mg, Every 8 hours PRN    hydrOXYzine pamoate (VISTARIL) 25-50 mg    ibuprofen (ADVIL,MOTRIN) 800 mg, 3 times daily    levETIRAcetam (KEPPRA) 1,000 mg, Oral, 2 times daily    lisinopriL-hydrochlorothiazide (PRINZIDE,ZESTORETIC) 20-12.5 mg per tablet 1 tablet, Daily    lurasidone (LATUDA) 80 mg, Daily    ondansetron (ZOFRAN-ODT) 4 mg, Every 6 hours PRN    pantoprazole (PROTONIX) 40 mg, Oral, Daily    traZODone (DESYREL) 100 mg, Nightly PRN    TRUE METRIX GLUCOSE METER Misc USE TO check glucose BEFORE meals AND AT BEDTIME    TRUE METRIX GLUCOSE TEST STRIP Strp 4 times daily    TRUEPLUS LANCETS 30 gauge Misc USE TO check glucose BEFORE meals AND AT BEDTIME       Current Inpatient Medications   busPIRone  15 mg Oral TID    ceFAZolin (Ancef) IV (PEDS and ADULTS)  2 g Intravenous Q8H    dexAMETHasone  4 mg Oral Q8H    [START ON 10/16/2024] DULoxetine  60 mg Oral Daily    [START ON 10/16/2024] lisinopriL  20 mg  Oral Daily    And    [START ON 10/16/2024] hydroCHLOROthiazide  12.5 mg Oral Daily    levETIRAcetam  1,500 mg Oral BID    [START ON 10/16/2024] lurasidone  80 mg Oral Daily    mupirocin   Nasal BID    [START ON 10/16/2024] pantoprazole  40 mg Oral Daily       Current Intravenous Infusions   0.9% NaCl   Intravenous Continuous 100 mL/hr at 10/15/24 1827 New Bag at 10/15/24 1827    clevidipine  0-16 mg/hr Intravenous Continuous             Review of Systems   Respiratory:  Negative for cough and shortness of breath.    Cardiovascular:  Negative for chest pain and palpitations.   Neurological:  Positive for headaches. Negative for sensory change, speech change, focal weakness and seizures.          Objective:       Intake/Output Summary (Last 24 hours) at 10/15/2024 1935  Last data filed at 10/15/2024 1755  Gross per 24 hour   Intake 3250 ml   Output 2540 ml   Net 710 ml         Vital Signs (Most Recent):  Temp: 98.5 °F (36.9 °C) (10/15/24 1830)  Pulse: 102 (10/15/24 1845)  Resp: (!) 24 (10/15/24 1845)  BP: (!) 172/59 (10/15/24 1845)  SpO2: (!) 92 % (10/15/24 1845)  Body mass index is 51.56 kg/m².  Weight: (!) 167.7 kg (369 lb 11.4 oz) Vital Signs (24h Range):  Temp:  [98.2 °F (36.8 °C)-98.6 °F (37 °C)] 98.5 °F (36.9 °C)  Pulse:  [] 102  Resp:  [18-44] 24  SpO2:  [92 %-96 %] 92 %  BP: (109-172)/(59-85) 172/59  Arterial Line BP: (172-176)/(61-73) 172/61     Physical Exam  Vitals reviewed.   Constitutional:       Appearance: He is obese.   HENT:      Head:      Comments: Dressing overlying head with ventricular drain present  Eyes:      Conjunctiva/sclera: Conjunctivae normal.      Pupils: Pupils are equal, round, and reactive to light.   Cardiovascular:      Rate and Rhythm: Normal rate and regular rhythm.      Pulses: Normal pulses.      Heart sounds: Normal heart sounds.   Pulmonary:      Effort: Pulmonary effort is normal. No respiratory distress.      Breath sounds: Normal breath sounds. No wheezing.  "  Abdominal:      General: Bowel sounds are normal.      Palpations: Abdomen is soft.   Musculoskeletal:      Right lower leg: No edema.      Left lower leg: No edema.   Skin:     General: Skin is warm and dry.   Neurological:      Mental Status: He is alert.      Comments: Patient oriented to year, but not month or date.  Oriented to place and situation.  Moving all 4 extremities without focal weakness.           Lines/Drains/Airways       Drain  Duration                  Closed/Suction Drain 10/15/24 1638 Right Scalp Accordion <1 day         Urethral Catheter 10/15/24 1145 Silicone;Straight-tip 16 Fr. <1 day              Arterial Line  Duration             Arterial Line 10/15/24 1055 Right Radial <1 day              Peripheral Intravenous Line  Duration                  Peripheral IV - Single Lumen 10/15/24 1022 20 G Posterior;Right Hand <1 day         Peripheral IV - Single Lumen 10/15/24 1906 18 G No Anterior;Left Forearm <1 day                    Significant Labs:    Lab Results   Component Value Date    WBC 14.46 (H) 10/15/2024    HGB 14.2 10/15/2024    HCT 41.4 (L) 10/15/2024    MCV 94.1 (H) 10/15/2024     10/15/2024           BMP  Lab Results   Component Value Date     (L) 10/15/2024    K 4.9 10/15/2024    CO2 24 10/15/2024    BUN 21.0 (H) 10/15/2024    CREATININE 0.91 10/15/2024    CALCIUM 7.7 (L) 10/15/2024    AGAP 10.0 10/15/2024    ESTGFRAFRICA 104 03/07/2022    EGFRNONAA 89 03/07/2022         ABG  No results for input(s): "PH", "PO2", "PCO2", "HCO3", "POCBASEDEF" in the last 168 hours.    Mechanical Ventilation Support:         Significant Imaging:  I have reviewed the pertinent imaging within the past 24 hours.        Assessment/Plan:     Assessment  1.8 x 1.2 cm left cerebellar lesion concerning for malignancy   R temporal lobe enhancing linear and nodular lesions  S/p R temporal craniotomy 10/15/2024  Past medical history of anxiety, bipolar disorder, depression, MASH, PTSD, seizure, " JANES      Plan  Admit to ICU for close monitoring  S/p craniotomy of brain mass -   Frozen section consistent with high grade glioma per OP note  Pathology pending   Post-op CT head done with   Q.1h neuro checks  Continue Keppra 1500 mg b.i.d.  Continue dexamethasone 4 mg q.8 hours  BP parameters less than 140/90  P.r.n. blood pressure on board  Cleviprex ordered p.r.n.  Resume home anxiety medications-clonazepam 1 mg b.i.d. p.r.n.  Resume home duloxetine, BuSpar  Moderate scale sliding scale insulin in setting of glucocorticoid use  Hemovac drain to full suction   Seizure precaution, aspiration precautions, raise head of bed  Multimodal pain control per neurosurgery - close monitoring w/ opioids    DVT Prophylaxis: SCDs  GI Prophylaxis:  Protonix        Brett Levy DO  Pulmonary Critical Care Medicine  [facility] - ICU  DOS: 10/15/2024

## 2024-10-15 NOTE — PROGRESS NOTES
Patient in CT. Handoff was done after CT was finished to Aneta DASILVA. Pt was transferred to ICU room once CT was complete. Patient in stable condition.

## 2024-10-15 NOTE — ANESTHESIA PROCEDURE NOTES
Intubation    Date/Time: 10/15/2024 11:37 AM    Performed by: Kaden Johnson CRNA  Authorized by: Tyrel Zepeda DO    Intubation:     Induction:  Intravenous    Intubated:  Postinduction    Mask Ventilation:  Moderately difficult with oral airway    Attempts:  1    Attempted By:  Student    Method of Intubation:  Video laryngoscopy    Blade:  Ly 4    Laryngeal View Grade: Grade IIA - cords partially seen      Difficult Airway Encountered?: No      Complications:  None    Airway Device:  Oral endotracheal tube    Airway Device Size:  8.0    Style/Cuff Inflation:  Cuffed (inflated to minimal occlusive pressure)    Tube secured:  23    Secured at:  The teeth    Placement Verified By:  Capnometry    Complicating Factors:  None    Findings Post-Intubation:  BS equal bilateral and atraumatic/condition of teeth unchanged

## 2024-10-15 NOTE — ANESTHESIA PREPROCEDURE EVALUATION
10/15/2024  Luis Gomes is a 34 y.o., male.      Pre-op Assessment    I have reviewed the Patient Summary Reports.     I have reviewed the Nursing Notes. I have reviewed the NPO Status.   I have reviewed the Medications.     Review of Systems  Anesthesia Hx:  No problems with previous Anesthesia                Social:  Smoker       Cardiovascular:     Hypertension   Denies MI.        Denies Angina.  Denies CHF.                                   Pulmonary:    Denies COPD.  Denies Asthma.    Sleep Apnea     Obstructive Sleep Apnea (JANES).           Hepatic/GI:     GERD, well controlled Liver Disease, (fatty liver)  Denies Hepatitis.           Liver Disease        Neurological:    Denies CVA.    Seizures, poorly controlled    CNS mass    Seizures are petit mal  Seizures started 2 weeks ago  Last seizure 2 days ago       Seizure Disorder                          Endocrine:  Denies Diabetes. Denies Hypothyroidism.  Denies Hyperthyroidism.       Obesity / BMI > 30, Morbid Obesity / BMI > 40  Psych:  Psychiatric History (PTSD)                  Physical Exam  General: Well nourished, Cooperative, Alert and Oriented    Airway:  Mallampati: I   Mouth Opening: Normal  TM Distance: Normal  Tongue: Large  Neck ROM: Normal ROM    Dental:  Intact        Anesthesia Plan  Type of Anesthesia, risks & benefits discussed:    Anesthesia Type: Gen ETT  Intra-op Monitoring Plan: Standard ASA Monitors and Art Line  Induction:  IV  Airway Plan: Video  Informed Consent: Informed consent signed with the Patient and all parties understand the risks and agree with anesthesia plan.  All questions answered. Patient consented to blood products? Yes  ASA Score: 3  Day of Surgery Review of History & Physical: H&P Update referred to the surgeon/provider.    Ready For Surgery From Anesthesia Perspective.     .

## 2024-10-16 LAB
ABS NEUT CALC (OHS): 21.13 X10(3)/MCL (ref 2.1–9.2)
ANION GAP SERPL CALC-SCNC: 12 MEQ/L
ANISOCYTOSIS BLD QL SMEAR: ABNORMAL
BUN SERPL-MCNC: 20.6 MG/DL (ref 8.9–20.6)
CALCIUM SERPL-MCNC: 8.2 MG/DL (ref 8.4–10.2)
CHLORIDE SERPL-SCNC: 99 MMOL/L (ref 98–107)
CO2 SERPL-SCNC: 23 MMOL/L (ref 22–29)
CREAT SERPL-MCNC: 0.95 MG/DL (ref 0.73–1.18)
CREAT/UREA NIT SERPL: 22
ERYTHROCYTE [DISTWIDTH] IN BLOOD BY AUTOMATED COUNT: 13 % (ref 11.5–17)
GFR SERPLBLD CREATININE-BSD FMLA CKD-EPI: >60 ML/MIN/1.73/M2
GLUCOSE SERPL-MCNC: 398 MG/DL (ref 74–100)
HCT VFR BLD AUTO: 43.4 % (ref 42–52)
HGB BLD-MCNC: 15.1 G/DL (ref 14–18)
LYMPHOCYTES NFR BLD MANUAL: 1.38 X10(3)/MCL
LYMPHOCYTES NFR BLD MANUAL: 6 % (ref 13–40)
MCH RBC QN AUTO: 33.3 PG (ref 27–31)
MCHC RBC AUTO-ENTMCNC: 34.8 G/DL (ref 33–36)
MCV RBC AUTO: 95.6 FL (ref 80–94)
MONOCYTES NFR BLD MANUAL: 0.46 X10(3)/MCL (ref 0.1–1.3)
MONOCYTES NFR BLD MANUAL: 2 % (ref 2–11)
NEUTROPHILS NFR BLD MANUAL: 92 % (ref 47–80)
NRBC BLD AUTO-RTO: 0 %
PLATELET # BLD AUTO: 216 X10(3)/MCL (ref 130–400)
PLATELET # BLD EST: NORMAL 10*3/UL
PMV BLD AUTO: 9.5 FL (ref 7.4–10.4)
POCT GLUCOSE: 262 MG/DL (ref 70–110)
POCT GLUCOSE: 324 MG/DL (ref 70–110)
POCT GLUCOSE: 392 MG/DL (ref 70–110)
POCT GLUCOSE: 443 MG/DL (ref 70–110)
POTASSIUM SERPL-SCNC: 4.4 MMOL/L (ref 3.5–5.1)
RBC # BLD AUTO: 4.54 X10(6)/MCL (ref 4.7–6.1)
RBC MORPH BLD: ABNORMAL
SODIUM SERPL-SCNC: 134 MMOL/L (ref 136–145)
WBC # BLD AUTO: 22.97 X10(3)/MCL (ref 4.5–11.5)

## 2024-10-16 PROCEDURE — 63600175 PHARM REV CODE 636 W HCPCS

## 2024-10-16 PROCEDURE — 36415 COLL VENOUS BLD VENIPUNCTURE: CPT

## 2024-10-16 PROCEDURE — 63600175 PHARM REV CODE 636 W HCPCS: Performed by: STUDENT IN AN ORGANIZED HEALTH CARE EDUCATION/TRAINING PROGRAM

## 2024-10-16 PROCEDURE — 25000003 PHARM REV CODE 250

## 2024-10-16 PROCEDURE — 80048 BASIC METABOLIC PNL TOTAL CA: CPT

## 2024-10-16 PROCEDURE — 85027 COMPLETE CBC AUTOMATED: CPT

## 2024-10-16 PROCEDURE — 85007 BL SMEAR W/DIFF WBC COUNT: CPT

## 2024-10-16 PROCEDURE — 27000221 HC OXYGEN, UP TO 24 HOURS

## 2024-10-16 PROCEDURE — C9248 INJ, CLEVIDIPINE BUTYRATE: HCPCS

## 2024-10-16 PROCEDURE — 97162 PT EVAL MOD COMPLEX 30 MIN: CPT

## 2024-10-16 PROCEDURE — 20000000 HC ICU ROOM

## 2024-10-16 PROCEDURE — 97166 OT EVAL MOD COMPLEX 45 MIN: CPT

## 2024-10-16 RX ORDER — INSULIN GLARGINE 100 [IU]/ML
10 INJECTION, SOLUTION SUBCUTANEOUS NIGHTLY
Status: DISCONTINUED | OUTPATIENT
Start: 2024-10-16 | End: 2024-10-17

## 2024-10-16 RX ORDER — HYDROMORPHONE HYDROCHLORIDE 2 MG/ML
2 INJECTION, SOLUTION INTRAMUSCULAR; INTRAVENOUS; SUBCUTANEOUS
Status: DISCONTINUED | OUTPATIENT
Start: 2024-10-16 | End: 2024-10-18 | Stop reason: HOSPADM

## 2024-10-16 RX ADMIN — BUSPIRONE HYDROCHLORIDE 15 MG: 5 TABLET ORAL at 08:10

## 2024-10-16 RX ADMIN — TRAZODONE HYDROCHLORIDE 100 MG: 100 TABLET ORAL at 09:10

## 2024-10-16 RX ADMIN — CLONAZEPAM 1 MG: 1 TABLET ORAL at 09:10

## 2024-10-16 RX ADMIN — CLEVIPIDINE 16 MG/HR: 0.5 EMULSION INTRAVENOUS at 09:10

## 2024-10-16 RX ADMIN — INSULIN ASPART 10 UNITS: 100 INJECTION, SOLUTION INTRAVENOUS; SUBCUTANEOUS at 06:10

## 2024-10-16 RX ADMIN — CLEVIPIDINE 16 MG/HR: 0.5 EMULSION INTRAVENOUS at 07:10

## 2024-10-16 RX ADMIN — CLEVIPIDINE 32 MG/HR: 0.5 EMULSION INTRAVENOUS at 12:10

## 2024-10-16 RX ADMIN — CLEVIPIDINE 14 MG/HR: 0.5 EMULSION INTRAVENOUS at 11:10

## 2024-10-16 RX ADMIN — HYDROCODONE BITARTRATE AND ACETAMINOPHEN 1 TABLET: 10; 325 TABLET ORAL at 10:10

## 2024-10-16 RX ADMIN — DEXAMETHASONE 4 MG: 4 TABLET ORAL at 05:10

## 2024-10-16 RX ADMIN — CLEVIPIDINE 32 MG/HR: 0.5 EMULSION INTRAVENOUS at 04:10

## 2024-10-16 RX ADMIN — CLEVIPIDINE 32 MG/HR: 0.5 EMULSION INTRAVENOUS at 05:10

## 2024-10-16 RX ADMIN — INSULIN ASPART 10 UNITS: 100 INJECTION, SOLUTION INTRAVENOUS; SUBCUTANEOUS at 11:10

## 2024-10-16 RX ADMIN — HYDRALAZINE HYDROCHLORIDE 10 MG: 20 INJECTION INTRAMUSCULAR; INTRAVENOUS at 12:10

## 2024-10-16 RX ADMIN — LABETALOL HYDROCHLORIDE 10 MG: 5 INJECTION, SOLUTION INTRAVENOUS at 04:10

## 2024-10-16 RX ADMIN — HYDROMORPHONE HYDROCHLORIDE 2 MG: 2 INJECTION, SOLUTION INTRAMUSCULAR; INTRAVENOUS; SUBCUTANEOUS at 12:10

## 2024-10-16 RX ADMIN — CLONAZEPAM 1 MG: 1 TABLET ORAL at 10:10

## 2024-10-16 RX ADMIN — LABETALOL HYDROCHLORIDE 10 MG: 5 INJECTION, SOLUTION INTRAVENOUS at 08:10

## 2024-10-16 RX ADMIN — HYDROMORPHONE HYDROCHLORIDE 2 MG: 2 INJECTION, SOLUTION INTRAMUSCULAR; INTRAVENOUS; SUBCUTANEOUS at 04:10

## 2024-10-16 RX ADMIN — BUSPIRONE HYDROCHLORIDE 15 MG: 5 TABLET ORAL at 02:10

## 2024-10-16 RX ADMIN — CLEVIPIDINE 32 MG/HR: 0.5 EMULSION INTRAVENOUS at 02:10

## 2024-10-16 RX ADMIN — HYDROCODONE BITARTRATE AND ACETAMINOPHEN 1 TABLET: 10; 325 TABLET ORAL at 02:10

## 2024-10-16 RX ADMIN — CEFAZOLIN SODIUM 2 G: 2 SOLUTION INTRAVENOUS at 02:10

## 2024-10-16 RX ADMIN — PANTOPRAZOLE SODIUM 40 MG: 40 TABLET, DELAYED RELEASE ORAL at 08:10

## 2024-10-16 RX ADMIN — DEXAMETHASONE 4 MG: 4 TABLET ORAL at 09:10

## 2024-10-16 RX ADMIN — HYDROCHLOROTHIAZIDE 12.5 MG: 12.5 TABLET ORAL at 08:10

## 2024-10-16 RX ADMIN — MUPIROCIN: 20 OINTMENT TOPICAL at 09:10

## 2024-10-16 RX ADMIN — LURASIDONE HYDROCHLORIDE 80 MG: 40 TABLET, FILM COATED ORAL at 08:10

## 2024-10-16 RX ADMIN — HYDRALAZINE HYDROCHLORIDE 10 MG: 20 INJECTION INTRAMUSCULAR; INTRAVENOUS at 09:10

## 2024-10-16 RX ADMIN — INSULIN ASPART 10 UNITS: 100 INJECTION, SOLUTION INTRAVENOUS; SUBCUTANEOUS at 05:10

## 2024-10-16 RX ADMIN — ONDANSETRON 4 MG: 2 INJECTION INTRAMUSCULAR; INTRAVENOUS at 03:10

## 2024-10-16 RX ADMIN — INSULIN GLARGINE 10 UNITS: 100 INJECTION, SOLUTION SUBCUTANEOUS at 09:10

## 2024-10-16 RX ADMIN — LEVETIRACETAM 1500 MG: 500 TABLET, FILM COATED ORAL at 08:10

## 2024-10-16 RX ADMIN — DEXAMETHASONE 4 MG: 4 TABLET ORAL at 02:10

## 2024-10-16 RX ADMIN — MUPIROCIN: 20 OINTMENT TOPICAL at 08:10

## 2024-10-16 RX ADMIN — LEVETIRACETAM 1500 MG: 500 TABLET, FILM COATED ORAL at 09:10

## 2024-10-16 RX ADMIN — LISINOPRIL 20 MG: 20 TABLET ORAL at 08:10

## 2024-10-16 RX ADMIN — CEFAZOLIN SODIUM 2 G: 2 SOLUTION INTRAVENOUS at 05:10

## 2024-10-16 RX ADMIN — BUSPIRONE HYDROCHLORIDE 15 MG: 5 TABLET ORAL at 09:10

## 2024-10-16 RX ADMIN — DULOXETINE HYDROCHLORIDE 60 MG: 30 CAPSULE, DELAYED RELEASE ORAL at 08:10

## 2024-10-16 NOTE — PT/OT/SLP EVAL
"Occupational Therapy  Evaluation    Name: Luis Gomes  MRN: [id number]  Admitting Diagnosis:   Recent Surgery: Procedure(s) (LRB):  CRANIOTOMY, WITH NEOPLASM EXCISION USING COMPUTER-ASSISTED NAVIGATION (Right) 1 Day Post-Op    Recommendations:     Discharge therapy intensity: Low Intensity Therapy   Discharge Equipment Recommendations:  TBD   Barriers to discharge:   ongoing medical needs    Assessment:     Luis Gomes is a 34 y.o. male with a medical diagnosis of brain mass s/p craniotomy with neoplasm excision, hx of seizure disorders, obesity, sleep apnea.  He presents awake, pleasant and agreeable.  He reports recent falls when symptoms appeared.  Pt modifies ADLs at baseline due to body habitus.  OT to introduce adaptive equipment for LB dressing to allow independence.    He presents with the following performance deficits affecting function: impaired self care skills, impaired functional mobility, impaired balance, visual deficits, impaired endurance, decreased safety awareness.     Rehab Prognosis: Good; patient would benefit from acute skilled OT services to address these deficits and reach maximum level of function.       Plan:     Patient to be seen 5 x/week to address the above listed problems via self-care/home management  Plan of Care Expires: 11/16/24  Plan of Care Reviewed with: patient, parent    Subjective     Chief Complaint: "I have a mild headache"  Patient/Family Comments/goals: "get better"    Occupational Profile:  Living Environment: lives with parents who are retired and able to assist, tub and walk in shower with bench  Previous level of function: independent dressing, wears crocs, difficulty donning socks and pants due to body habitus, showers after BM due to difficulty reaching for wiping.  Pt does not drive due to seizures.  He enjoys cooking.  Pt recently ordered a cane due to falls.  Roles and Routines: son  Equipment Used at Home:  (shower chair, cane)  Assistance upon Discharge: " parents    Pain/Comfort:  Pain Rating 1: 2/10 (headache, repositioning/distraction)    Patients cultural, spiritual, Yazidism conflicts given the current situation:      Objective:     OT communicated with RN prior to session.      Patient was found HOB elevated with  (art line, vital monitoring, hemovac) upon OT entry to room.    General Precautions: Standard, seizure (<140/90)  Orthopedic Precautions:    Braces: N/A    Vital Signs: Blood Pressure: 138/73 nasal cannula 5L    Bed Mobility:    Patient completed Supine to Sit with minimum assistance      Functional Mobility/Transfers:  Patient completed Sit <> Stand Transfer with minimum assistance  with  hand-held assist   Patient completed Bed <> Chair Transfer using Step Transfer technique with minimum assistance with hand-held assist  Functional Mobility: minx2 HHA progressing towards min A.  Low endurance.    Activities of Daily Living:  Lower Body Dressing: maximal assistance introduce hip kit In upcoming sessions  Toileting: maximal assistance requires modifications at baseline  Grooming: SBA    AMPA 6 Click ADL:  AMPAC Total Score: 16    Functional Cognition:  Intact  Affect: Appropriate to situation    Visual Perceptual Skills:  Nystagmus noted with tracking, dysmetria present with LUE    Upper Extremity Function:  Right Upper Extremity:   WFL    Left Upper Extremity:  WFL    Balance:   Intact    Therapeutic Positioning  Risk for acquired pressure injuries is decreased due to ability to get to BSC/toilet with assist.    OT interventions performed during the course of today's session:   Therapeutic positioning was provided at the conclusion of session to offload all bony prominences for the prevention and/or reduction of pressure injuries    Skin assessment: all bony prominences were assessed    Findings: redness on buttock present, heels clear    OT recommendations for therapeutic positioning throughout hospitalization:   Follow [facility] Pressure Injury  Prevention Protocol  Jayashreet recommended for sacral protection while UIC      Patient Education:  Patient provided with verbal education education regarding OT role/goals/POC, post op precautions, fall prevention, and Discharge/DME recommendations.  Understanding was verbalized, however additional teaching warranted.     Patient left up in chair with all lines intact and RN and parents present.    GOALS:   Multidisciplinary Problems       Occupational Therapy Goals          Problem: Occupational Therapy    Goal Priority Disciplines Outcome Interventions   Occupational Therapy Goal     OT, PT/OT Progressing    Description: Goals to be met by: 11/16/24     Patient will increase functional independence with ADLs by performing:    UE Dressing with Modified Sanders.  LE Dressing with Modified Sanders with use of AD.  Grooming while standing at sink with Modified Sanders.  Toileting from toilet with Modified Sanders for hygiene and clothing management, may need AD  Toilet transfer to toilet with Modified Sanders.                         History:     Past Medical History:   Diagnosis Date    Anxiety     Bipolar disorder 03/04/2022    Brain mass     Depression     Fatty liver     High blood sugar     Insomnia     Posttraumatic stress disorder 03/19/2019    Seizures     Sleep apnea          Past Surgical History:   Procedure Laterality Date    CRANIOTOMY, WITH NEOPLASM EXCISION USING COMPUTER-ASSISTED NAVIGATION Right 10/15/2024    Procedure: CRANIOTOMY, WITH NEOPLASM EXCISION USING COMPUTER-ASSISTED NAVIGATION;  Surgeon: Cole Smith MD;  Location: [facility];  Service: Neurosurgery;  Laterality: Right;  right temporal crani for excision of tumor  Stealth // ULTRASOUND // TIVA SETUP  NTI (meps, sseps, eeg, surbcortical stim - need grid)       Time Tracking:     OT Date of Treatment:    OT Start Time: 1010  OT Stop Time: 1030  OT Total Time (min): 20 min    Billable Minutes:Evaluation  MOD    10/16/2024

## 2024-10-16 NOTE — PROGRESS NOTES
[facility] [address]  Neurosurgery  Progress Note    Subjective:     Interval History:   POD #1: Right temporal craniotomy for tumor excision  Patient is laying in bed.  He currently denies of having a headache.  His pain is under control.  His systolic blood pressure on the arterial line is showing 150-160 while being on Cleviprex.  Sodium is 134 this morning.    Post-Op Info:  Procedure(s) (LRB):  CRANIOTOMY, WITH NEOPLASM EXCISION USING COMPUTER-ASSISTED NAVIGATION (Right)   1 Day Post-Op      Medications:  Continuous Infusions:   0.9% NaCl   Intravenous Continuous   Stopped at 10/16/24 0527    clevidipine  0-16 mg/hr Intravenous Continuous 32 mL/hr at 10/16/24 0714 16 mg/hr at 10/16/24 0714     Scheduled Meds:   busPIRone  15 mg Oral TID    ceFAZolin (Ancef) IV (PEDS and ADULTS)  2 g Intravenous Q8H    dexAMETHasone  4 mg Oral Q8H    DULoxetine  60 mg Oral Daily    lisinopriL  20 mg Oral Daily    And    hydroCHLOROthiazide  12.5 mg Oral Daily    levETIRAcetam  1,500 mg Oral BID    lurasidone  80 mg Oral Daily    mupirocin   Nasal BID    pantoprazole  40 mg Oral Daily     PRN Meds:  Current Facility-Administered Medications:     acetaminophen, 650 mg, Oral, Q6H PRN    clonazePAM, 1 mg, Oral, BID PRN    dextrose 10%, 12.5 g, Intravenous, PRN    dextrose 10%, 25 g, Intravenous, PRN    dicyclomine, 20 mg, Oral, Q6H PRN    glucagon (human recombinant), 1 mg, Intramuscular, PRN    glucose, 16 g, Oral, PRN    glucose, 24 g, Oral, PRN    hydrALAZINE, 10 mg, Intravenous, Q1H PRN    HYDROcodone-acetaminophen, 1 tablet, Oral, Q4H PRN    HYDROcodone-acetaminophen, 1 tablet, Oral, Q4H PRN    HYDROmorphone, 2 mg, Intravenous, Q2H PRN    hydrOXYzine pamoate, 50 mg, Oral, Q8H PRN    insulin aspart U-100, 0-10 Units, Subcutaneous, QID (AC + HS) PRN    labetalol, 10 mg, Intravenous, Q4H PRN    ondansetron, 4 mg, Intravenous, Q6H PRN    prochlorperazine, 5 mg, Intravenous, Q6H PRN    traZODone, 100 mg, Oral,  "Nightly PRN     Review of Systems  Objective:     Weight: (!) 157.1 kg (346 lb 5.5 oz)  Body mass index is 48.3 kg/m².  Vital Signs (Most Recent):  Temp: 98.2 °F (36.8 °C) (10/16/24 0400)  Pulse: 103 (10/16/24 0715)  Resp: (!) 37 (10/16/24 0715)  BP: (!) 151/78 (10/16/24 0600)  SpO2: (!) 89 % (10/16/24 0715) Vital Signs (24h Range):  Temp:  [97.7 °F (36.5 °C)-98.6 °F (37 °C)] 98.2 °F (36.8 °C)  Pulse:  [] 103  Resp:  [13-51] 37  SpO2:  [89 %-96 %] 89 %  BP: (109-172)/(51-95) 151/78  Arterial Line BP: ()/(47-83) 145/55                              Closed/Suction Drain 10/15/24 1638 Right Scalp Accordion (Active)   Site Description Unable to view 10/16/24 0740   Dressing Type Gauze 10/16/24 0740   Dressing Status Clean;Dry;Intact 10/16/24 0740   Dressing Intervention Integrity maintained 10/16/24 0740   Drainage Serosanguineous 10/16/24 0740   Status To bulb suction 10/16/24 0740   Output (mL) 110 mL 10/16/24 0600            Urethral Catheter 10/15/24 1145 Silicone;Straight-tip 16 Fr. (Active)   Site Assessment Clean;Intact;Dry 10/16/24 0740   Collection Container Urimeter 10/16/24 0740   Securement Method secured to top of thigh w/ adhesive device 10/16/24 0740   Catheter Care Performed yes 10/16/24 0740   Reason for Continuing Urinary Catheterization Post operative 10/16/24 0740   CAUTI Prevention Bundle Securement Device in place with 1" slack;Intact seal between catheter & drainage tubing;Drainage bag/urimeter off the floor;Sheeting clip in use;No dependent loops or kinks;Drainage bag/urimeter not overfilled (<2/3 full);Drainage bag/urimeter below bladder 10/16/24 0740   Output (mL) 375 mL 10/16/24 0600       Neurosurgery Physical Exam  GCS:  15  E: 4, V: 5, M: 6  Alert and oriented to self, place, month, and year   PERRLA and EOMI  Speech is clear and coherent  No facial droop  No pronator drift  Follow commands  Move all extremities on command  Surgical turban: dry blood, intact  Hemovac drain: " "sanguineous  Hemovac output (12hrs/24hrs): 110/150mL      Significant Labs:  Recent Labs   Lab 10/15/24  1828 10/16/24  0419   * 134*   K 4.9 4.4   CL 98 99   CO2 24 23   BUN 21.0* 20.6   CREATININE 0.91 0.95   CALCIUM 7.7* 8.2*     Recent Labs   Lab 10/15/24  1828 10/16/24  0419   WBC 14.46* 22.97*   HGB 14.2 15.1   HCT 41.4* 43.4    216     No results for input(s): "LABPT", "INR", "APTT" in the last 48 hours.  Microbiology Results (last 7 days)       ** No results found for the last 168 hours. **            Significant Diagnostics:  CT head without contrast on 10/15/2024 showed:  FINDINGS:  The patient has undergone recent craniotomy for evacuation of the right temporal and parietal mass as well as the mass extending to the cerebellopontine angle.  There is small amount of hemorrhage seen at the resection site.  There is some edema seen at the resection site.  There is mass effect on the right lateral ventricle and effacement the right lateral ventricle.  No significant midline shift is seen.  There is evidence of pneumo cephaly seen.     Impression:     Extensive postsurgical changes seen as outlined above    Assessment/Plan:    Plan:  - ICU status  - Neuro checks Q2H  - SBP<140/90, wean cleviprex gtt   - Keppra 1500mg BID  - Pain control  - Decadron 4mg Q8H  - D/C muir catheter   - Hemovac drain to full suction  - PT, OT  - SCDs for DVT prophylaxis     Active Diagnoses:    Diagnosis Date Noted POA    PRINCIPAL PROBLEM:  Brain mass [G93.89] 10/15/2024 Yes      Problems Resolved During this Admission:       HOLLIE Torres-BC  Neurosurgery  [facility] - [address] [place] [address]    "

## 2024-10-16 NOTE — PROGRESS NOTES
Inpatient Nutrition Evaluation    Admit Date: 10/15/2024   Total duration of encounter: 1 day   Patient Age: 34 y.o.    Nutrition Recommendation/Prescription     Continue Diet Adult Regular as tolerated.    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   R temporal lobe enhancing linear and nodular lesions  S/p R temporal craniotomy 10/15/2024    Relevant Medical History: HTN, HLD, depression, anxiety, bipolar, seizures    Scheduled Medications:  busPIRone, 15 mg, TID  ceFAZolin (Ancef) IV (PEDS and ADULTS), 2 g, Q8H  dexAMETHasone, 4 mg, Q8H  DULoxetine, 60 mg, Daily  lisinopriL, 20 mg, Daily   And  hydroCHLOROthiazide, 12.5 mg, Daily  levETIRAcetam, 1,500 mg, BID  lurasidone, 80 mg, Daily  mupirocin, , BID  pantoprazole, 40 mg, Daily    Continuous Infusions:  0.9% NaCl, Last Rate: Stopped (10/16/24 0527)  clevidipine, Last Rate: 16 mg/hr (10/16/24 0917)    PRN Medications:   Current Facility-Administered Medications:     acetaminophen, 650 mg, Oral, Q6H PRN    clonazePAM, 1 mg, Oral, BID PRN    dextrose 10%, 12.5 g, Intravenous, PRN    dextrose 10%, 25 g, Intravenous, PRN    dicyclomine, 20 mg, Oral, Q6H PRN    glucagon (human recombinant), 1 mg, Intramuscular, PRN    glucose, 16 g, Oral, PRN    glucose, 24 g, Oral, PRN    hydrALAZINE, 10 mg, Intravenous, Q1H PRN    HYDROcodone-acetaminophen, 1 tablet, Oral, Q4H PRN    HYDROcodone-acetaminophen, 1 tablet, Oral, Q4H PRN    HYDROmorphone, 2 mg, Intravenous, Q2H PRN    hydrOXYzine pamoate, 50 mg, Oral, Q8H PRN    insulin aspart U-100, 0-10 Units, Subcutaneous, QID (AC + HS) PRN    labetalol, 10 mg, Intravenous, Q4H PRN    ondansetron, 4 mg, Intravenous, Q6H PRN    prochlorperazine, 5  "mg, Intravenous, Q6H PRN    traZODone, 100 mg, Oral, Nightly PRN    Recent Labs   Lab 10/15/24  1828 10/16/24  0419   * 134*   K 4.9 4.4   CALCIUM 7.7* 8.2*   CL 98 99   CO2 24 23   BUN 21.0* 20.6   CREATININE 0.91 0.95   EGFRNORACEVR >60 >60   GLUCOSE 313* 398*   WBC 14.46* 22.97*   HGB 14.2 15.1   HCT 41.4* 43.4     Nutrition Orders:  Diet Adult Regular      Appetite/Oral Intake: good/% of meals  Factors Affecting Nutritional Intake: none identified  Food/Anabaptism/Cultural Preferences: none reported  Food Allergies: tree nut  Last Bowel Movement: 10/14/24  Wound(s):  Incision documentation noted     Comments    10/16/24: No wt or appetite changes PTA. Good po intake of meals.    Anthropometrics    Height: 5' 11" (180.3 cm), Height Method: Stated  Last Weight: (!) 157.1 kg (346 lb 5.5 oz) (10/16/24 0600), Weight Method: Bed Scale  BMI (Calculated): 48.3  BMI Classification: obese grade III (BMI >/=40)        Ideal Body Weight (IBW), Male: 172 lb     % Ideal Body Weight, Male (lb): 201.37 %                          Usual Weight Provided By: patient denies unintentional weight loss    Wt Readings from Last 5 Encounters:   10/16/24 (!) 157.1 kg (346 lb 5.5 oz)   10/14/24 (!) 178.2 kg (392 lb 12.8 oz)   09/27/24 (!) 145 kg (319 lb 10.7 oz)   05/23/22 (!) 159.7 kg (352 lb)   04/18/22 (!) 159.2 kg (351 lb)     Weight Change(s) Since Admission:   Wt Readings from Last 1 Encounters:   10/16/24 0600 (!) 157.1 kg (346 lb 5.5 oz)   10/15/24 1007 (!) 167.7 kg (369 lb 11.4 oz)   10/08/24 0931 (!) 181.4 kg (400 lb)   Admit Weight: (!) 181.4 kg (400 lb) (10/08/24 0931), Weight Method: Stated    Patient Education     Not applicable.    Nutrition Goals & Monitoring     Dietitian will monitor: food and beverage intake and energy intake    Nutrition Risk/Follow-Up: low (follow-up in 5-7 days)  Patients assigned 'low nutrition risk' status do not qualify for a full nutritional assessment but will be monitored and " re-evaluated in a 5-7 day time period. Please consult if re-evaluation needed sooner.

## 2024-10-16 NOTE — PLAN OF CARE
Pt lives with parents, has been diagnosed with mental illness, and states he ordered a cane for when he goes home.   10/16/24 1507   Discharge Assessment   Assessment Type Discharge Planning Assessment   Confirmed/corrected address, phone number and insurance Yes   Confirmed Demographics Correct on Facesheet   Source of Information patient   When was your last doctors appointment?   (2 weeks ago)   Communicated SELVIN with patient/caregiver Date not available/Unable to determine   Reason For Admission Brain Mass   Do you expect to return to your current living situation? Yes   Do you have help at home or someone to help you manage your care at home? Yes   Who are your caregiver(s) and their phone number(s)? parents   Prior to hospitilization cognitive status: Unable to Assess   Current cognitive status: Alert/Oriented   Walking or Climbing Stairs Difficulty no   Dressing/Bathing Difficulty no   Equipment Currently Used at Home none   Readmission within 30 days? Yes   Patient currently being followed by outpatient case management? No   Do you currently have service(s) that help you manage your care at home? No   Do you take prescription medications? Yes   Do you have prescription coverage? Yes   Coverage tuyet   Do you have any problems affording any of your prescribed medications? No   Is the patient taking medications as prescribed? yes   Who is going to help you get home at discharge? parents   How do you get to doctors appointments? family or friend will provide   Are you on dialysis? No   Do you take coumadin? No   Discharge Plan A Home with family;Home Health   Discharge Plan B Home with family   DME Needed Upon Discharge  other (see comments)  (tbd)   Discharge Plan discussed with: Patient;Parent(s)   Name(s) and Number(s) saurav [id number] [phone]   Transition of Care Barriers Mental illness   Physical Activity   On average, how many days per week do you engage in moderate to strenuous exercise (like a brisk walk)? 0  days   On average, how many minutes do you engage in exercise at this level? 0 min   Financial Resource Strain   How hard is it for you to pay for the very basics like food, housing, medical care, and heating?   (n/a)   Housing Stability   In the last 12 months, was there a time when you were not able to pay the mortgage or rent on time? N   At any time in the past 12 months, were you homeless or living in a shelter (including now)? N   Transportation Needs   Has the lack of transportation kept you from medical appointments, meetings, work or from getting things needed for daily living? No   Food Insecurity   Within the past 12 months, you worried that your food would run out before you got the money to buy more. Never true   Within the past 12 months, the food you bought just didn't last and you didn't have money to get more. Never true   Stress   Do you feel stress - tense, restless, nervous, or anxious, or unable to sleep at night because your mind is troubled all the time - these days? To some exte   Social Isolation   How often do you feel lonely or isolated from those around you?  Sometimes   Alcohol Use   Q1: How often do you have a drink containing alcohol? Monthly or l   Q2: How many drinks containing alcohol do you have on a typical day when you are drinking? 1 or 2   Utilities   In the past 12 months has the electric, gas, oil, or water company threatened to shut off services in your home? No   Health Literacy   How often do you need to have someone help you when you read instructions, pamphlets, or other written material from your doctor or pharmacy? Never

## 2024-10-16 NOTE — PLAN OF CARE
Problem: Wound  Goal: Optimal Coping  Outcome: Progressing  Goal: Optimal Functional Ability  Outcome: Progressing  Goal: Absence of Infection Signs and Symptoms  Outcome: Progressing  Goal: Improved Oral Intake  Outcome: Progressing  Goal: Optimal Wound Healing  Outcome: Progressing     Problem: Adult Inpatient Plan of Care  Goal: Optimal Comfort and Wellbeing  Outcome: Progressing     Problem: Infection  Goal: Absence of Infection Signs and Symptoms  Outcome: Progressing

## 2024-10-16 NOTE — ANESTHESIA POSTPROCEDURE EVALUATION
Anesthesia Post Evaluation    Patient: Luis Gomes    Procedure(s) Performed: Procedure(s) (LRB):  CRANIOTOMY, WITH NEOPLASM EXCISION USING COMPUTER-ASSISTED NAVIGATION (Right)    Final Anesthesia Type: general      Patient location during evaluation: PACU  Patient participation: Yes- Able to Participate  Level of consciousness: awake and alert  Post-procedure vital signs: reviewed and stable  Pain management: adequate  Airway patency: patent    PONV status at discharge: No PONV  Anesthetic complications: no      Cardiovascular status: blood pressure returned to baseline  Respiratory status: unassisted and spontaneous ventilation  Hydration status: euvolemic  Follow-up needed               Vitals Value Taken Time   /64 10/15/24 1917   Temp 36.9 °C (98.5 °F) 10/15/24 1830   Pulse 74 10/15/24 1922   Resp 31 10/15/24 1922   SpO2 93 % 10/15/24 1922   Vitals shown include unfiled device data.      Event Time   Out of Recovery 18:10:00         Pain/Craig Score: Craig Score: 9 (10/15/2024  5:55 PM)

## 2024-10-16 NOTE — PLAN OF CARE
Problem: Physical Therapy  Goal: Physical Therapy Goal  Description: Goals to be met by: 24     Patient will increase functional independence with mobility by performin. Supine to sit with [place]  2. Sit to supine with [place]  3. Sit to stand transfer with [place]  4. Gait  x 200 feet with Modified [place] using Rolling Walker vs no AD.     Outcome: Progressing

## 2024-10-16 NOTE — PLAN OF CARE
Problem: Occupational Therapy  Goal: Occupational Therapy Goal  Description: Goals to be met by: 11/16/24     Patient will increase functional independence with ADLs by performing:    UE Dressing with Modified [place].  LE Dressing with Modified [place] with use of AD.  Grooming while standing at sink with Modified [place].  Toileting from toilet with Modified [place] for hygiene and clothing management, may need AD  Toilet transfer to toilet with Modified [place].    Outcome: Progressing

## 2024-10-16 NOTE — PT/OT/SLP EVAL
Physical Therapy Evaluation    Patient Name:  Luis Gomes   MRN:  [id number]    Recommendations:     Discharge therapy intensity: Low Intensity Therapy   Discharge Equipment Recommendations:  (TBD)   Barriers to discharge:  medical dx, impaired mobility, decreased independence     Assessment:     Luis Gomes is a 34 y.o. 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 Hx of seizures and bipolar.     He presents with the following impairments/functional limitations: gait instability, impaired endurance, impaired balance, impaired self care skills, impaired functional mobility, impaired cardiopulmonary response to activity, decreased safety awareness.    Pt tolerates PT eval fairly well. Pt requires Mary overall for transfers and ambulation. Slightly anxious at times. Appears as if he will make good progress but will update recommendations as necessary.     Rehab Prognosis: Good; patient would benefit from acute skilled PT services to address these deficits and reach maximum level of function.    Recent Surgery: Procedure(s) (LRB):  CRANIOTOMY, WITH NEOPLASM EXCISION USING COMPUTER-ASSISTED NAVIGATION (Right) 1 Day Post-Op    Plan:     During this hospitalization, patient would benefit from acute PT services 6 x/week to address the identified rehab impairments via gait training, therapeutic activities, therapeutic exercises, neuromuscular re-education and progress toward the following goals:    Plan of Care Expires:  11/16/24    Subjective     Chief Complaint: mild headache  Patient/Family Comments/goals: to return home   Pain/Comfort:  Pain Rating 1: 2/10  Location 1:  (headache)    Patients cultural, spiritual, Sabianism conflicts given the current situation: no    Living Environment:  Pt lives with parents in a [facility]  Prior to admission, patients level of function was independent; reports recent hx of falls.    Equipment used at home: cane, straight, shower chair.    Upon  discharge, patient will have assistance from parentrs.    Objective:     Communicated with NSG prior to session.  Patient found HOB elevated with blood pressure cuff, pulse ox (continuous), peripheral IV, arterial line, telemetry, oxygen, hemovac, muir catheter  upon PT entry to room.    General Precautions: Standard, fall (SBP<140/90)  Orthopedic Precautions:N/A   Braces: N/A  Respiratory Status: Nasal cannula, flow 5 L/min  Blood Pressure: 138/73      Exams:  Cognitive Exam:  Patient is oriented to Person, Place, Time, and Situation  RLE Strength: 5/5  LLE Strength: 5/5  Skin integrity: Visible skin intact; head turbin wrapped      Functional Mobility:  Bed Mobility:     Supine to Sit: minimum assistance with HOB elevated   Transfers:     Sit to Stand:  minimum assistance and of 2 persons with hand-held assist  Gait: pt ambulates approx 50 ft with Mary and no AD, holds on to IV pole; fairly steady, step through pattern; limited by decreased tolerance to activity; no major LOB       AM-PAC 6 CLICK MOBILITY  Total Score:18       Treatment & Education:  Patient and parents   provided with verbal education  regarding PT role/goals/POC, post-op precautions, fall prevention, safety awareness, and discharge/DME recommendations.  Understanding was verbalized.     Patient left up in chair with all lines intact, call button in reach, RN notified, and parents present.    GOALS:   Multidisciplinary Problems       Physical Therapy Goals          Problem: Physical Therapy    Goal Priority Disciplines Outcome Interventions   Physical Therapy Goal     PT, PT/OT Progressing    Description: Goals to be met by: 24     Patient will increase functional independence with mobility by performin. Supine to sit with [place]  2. Sit to supine with [place]  3. Sit to stand transfer with [place]  4. Gait  x 200 feet with Modified [place] using Rolling Walker vs no AD.                          History:      Past Medical History:   Diagnosis Date    Anxiety     Bipolar disorder 03/04/2022    Brain mass     Depression     Fatty liver     High blood sugar     Insomnia     Posttraumatic stress disorder 03/19/2019    Seizures     Sleep apnea        Past Surgical History:   Procedure Laterality Date    CRANIOTOMY, WITH NEOPLASM EXCISION USING COMPUTER-ASSISTED NAVIGATION Right 10/15/2024    Procedure: CRANIOTOMY, WITH NEOPLASM EXCISION USING COMPUTER-ASSISTED NAVIGATION;  Surgeon: Cole Smith MD;  Location: [facility] OR;  Service: Neurosurgery;  Laterality: Right;  right temporal crani for excision of tumor  Stealth // ULTRASOUND // TIVA SETUP  NTI (meps, sseps, eeg, surbcortical stim - need grid)       Time Tracking:     PT Received On: 10/16/24  PT Start Time: 1011     PT Stop Time: 1034  PT Total Time (min): 23 min     Billable Minutes: Evaluation mod      10/16/2024

## 2024-10-17 LAB
ALBUMIN SERPL-MCNC: 3.1 G/DL (ref 3.5–5)
ALBUMIN/GLOB SERPL: 1.1 RATIO (ref 1.1–2)
ALP SERPL-CCNC: 47 UNIT/L (ref 40–150)
ALT SERPL-CCNC: 108 UNIT/L (ref 0–55)
ANION GAP SERPL CALC-SCNC: 7 MEQ/L
AST SERPL-CCNC: 40 UNIT/L (ref 5–34)
BASOPHILS # BLD AUTO: 0.01 X10(3)/MCL
BASOPHILS NFR BLD AUTO: 0.1 %
BILIRUB SERPL-MCNC: 1.2 MG/DL
BUN SERPL-MCNC: 19.6 MG/DL (ref 8.9–20.6)
CALCIUM SERPL-MCNC: 8.1 MG/DL (ref 8.4–10.2)
CHLORIDE SERPL-SCNC: 98 MMOL/L (ref 98–107)
CO2 SERPL-SCNC: 28 MMOL/L (ref 22–29)
CREAT SERPL-MCNC: 0.8 MG/DL (ref 0.73–1.18)
CREAT/UREA NIT SERPL: 25
EOSINOPHIL # BLD AUTO: 0 X10(3)/MCL (ref 0–0.9)
EOSINOPHIL NFR BLD AUTO: 0 %
ERYTHROCYTE [DISTWIDTH] IN BLOOD BY AUTOMATED COUNT: 13.2 % (ref 11.5–17)
GFR SERPLBLD CREATININE-BSD FMLA CKD-EPI: >60 ML/MIN/1.73/M2
GLOBULIN SER-MCNC: 2.7 GM/DL (ref 2.4–3.5)
GLUCOSE SERPL-MCNC: 240 MG/DL (ref 74–100)
HCT VFR BLD AUTO: 40.7 % (ref 42–52)
HGB BLD-MCNC: 13.4 G/DL (ref 14–18)
IMM GRANULOCYTES # BLD AUTO: 0.06 X10(3)/MCL (ref 0–0.04)
IMM GRANULOCYTES NFR BLD AUTO: 0.5 %
LYMPHOCYTES # BLD AUTO: 0.72 X10(3)/MCL (ref 0.6–4.6)
LYMPHOCYTES NFR BLD AUTO: 5.7 %
MCH RBC QN AUTO: 32.3 PG (ref 27–31)
MCHC RBC AUTO-ENTMCNC: 32.9 G/DL (ref 33–36)
MCV RBC AUTO: 98.1 FL (ref 80–94)
MONOCYTES # BLD AUTO: 0.8 X10(3)/MCL (ref 0.1–1.3)
MONOCYTES NFR BLD AUTO: 6.4 %
NEUTROPHILS # BLD AUTO: 10.96 X10(3)/MCL (ref 2.1–9.2)
NEUTROPHILS NFR BLD AUTO: 87.3 %
NRBC BLD AUTO-RTO: 0 %
PLATELET # BLD AUTO: 154 X10(3)/MCL (ref 130–400)
PMV BLD AUTO: 9.7 FL (ref 7.4–10.4)
POCT GLUCOSE: 237 MG/DL (ref 70–110)
POCT GLUCOSE: 316 MG/DL (ref 70–110)
POCT GLUCOSE: 332 MG/DL (ref 70–110)
POTASSIUM SERPL-SCNC: 4.7 MMOL/L (ref 3.5–5.1)
PROT SERPL-MCNC: 5.8 GM/DL (ref 6.4–8.3)
RBC # BLD AUTO: 4.15 X10(6)/MCL (ref 4.7–6.1)
SODIUM SERPL-SCNC: 133 MMOL/L (ref 136–145)
WBC # BLD AUTO: 12.55 X10(3)/MCL (ref 4.5–11.5)

## 2024-10-17 PROCEDURE — A9577 INJ MULTIHANCE: HCPCS | Performed by: INTERNAL MEDICINE

## 2024-10-17 PROCEDURE — 97535 SELF CARE MNGMENT TRAINING: CPT

## 2024-10-17 PROCEDURE — 25500020 PHARM REV CODE 255: Performed by: INTERNAL MEDICINE

## 2024-10-17 PROCEDURE — 80053 COMPREHEN METABOLIC PANEL: CPT

## 2024-10-17 PROCEDURE — 11000001 HC ACUTE MED/SURG PRIVATE ROOM

## 2024-10-17 PROCEDURE — 36415 COLL VENOUS BLD VENIPUNCTURE: CPT

## 2024-10-17 PROCEDURE — 21400001 HC TELEMETRY ROOM

## 2024-10-17 PROCEDURE — 85025 COMPLETE CBC W/AUTO DIFF WBC: CPT

## 2024-10-17 PROCEDURE — 25000003 PHARM REV CODE 250

## 2024-10-17 PROCEDURE — 63600175 PHARM REV CODE 636 W HCPCS

## 2024-10-17 PROCEDURE — 63600175 PHARM REV CODE 636 W HCPCS: Performed by: STUDENT IN AN ORGANIZED HEALTH CARE EDUCATION/TRAINING PROGRAM

## 2024-10-17 PROCEDURE — 97116 GAIT TRAINING THERAPY: CPT

## 2024-10-17 PROCEDURE — 63600175 PHARM REV CODE 636 W HCPCS: Performed by: INTERNAL MEDICINE

## 2024-10-17 RX ORDER — IBUPROFEN 200 MG
24 TABLET ORAL
Status: DISCONTINUED | OUTPATIENT
Start: 2024-10-17 | End: 2024-10-18 | Stop reason: HOSPADM

## 2024-10-17 RX ORDER — INSULIN GLARGINE 100 [IU]/ML
15 INJECTION, SOLUTION SUBCUTANEOUS NIGHTLY
Status: DISCONTINUED | OUTPATIENT
Start: 2024-10-17 | End: 2024-10-18 | Stop reason: HOSPADM

## 2024-10-17 RX ORDER — GLUCAGON 1 MG
1 KIT INJECTION
Status: DISCONTINUED | OUTPATIENT
Start: 2024-10-17 | End: 2024-10-18 | Stop reason: HOSPADM

## 2024-10-17 RX ORDER — INSULIN ASPART 100 [IU]/ML
0-10 INJECTION, SOLUTION INTRAVENOUS; SUBCUTANEOUS
Status: DISCONTINUED | OUTPATIENT
Start: 2024-10-17 | End: 2024-10-18 | Stop reason: HOSPADM

## 2024-10-17 RX ORDER — IBUPROFEN 200 MG
16 TABLET ORAL
Status: DISCONTINUED | OUTPATIENT
Start: 2024-10-17 | End: 2024-10-18 | Stop reason: HOSPADM

## 2024-10-17 RX ADMIN — BUSPIRONE HYDROCHLORIDE 15 MG: 5 TABLET ORAL at 09:10

## 2024-10-17 RX ADMIN — LURASIDONE HYDROCHLORIDE 80 MG: 40 TABLET, FILM COATED ORAL at 10:10

## 2024-10-17 RX ADMIN — BUSPIRONE HYDROCHLORIDE 15 MG: 5 TABLET ORAL at 01:10

## 2024-10-17 RX ADMIN — MUPIROCIN: 20 OINTMENT TOPICAL at 09:10

## 2024-10-17 RX ADMIN — CLONAZEPAM 1 MG: 1 TABLET ORAL at 09:10

## 2024-10-17 RX ADMIN — GADOBENATE DIMEGLUMINE 20 ML: 529 INJECTION, SOLUTION INTRAVENOUS at 01:10

## 2024-10-17 RX ADMIN — LABETALOL HYDROCHLORIDE 10 MG: 5 INJECTION, SOLUTION INTRAVENOUS at 02:10

## 2024-10-17 RX ADMIN — HYDROCODONE BITARTRATE AND ACETAMINOPHEN 1 TABLET: 10; 325 TABLET ORAL at 01:10

## 2024-10-17 RX ADMIN — INSULIN ASPART 8 UNITS: 100 INJECTION, SOLUTION INTRAVENOUS; SUBCUTANEOUS at 08:10

## 2024-10-17 RX ADMIN — HYDROCHLOROTHIAZIDE 12.5 MG: 12.5 TABLET ORAL at 09:10

## 2024-10-17 RX ADMIN — LEVETIRACETAM 1500 MG: 500 TABLET, FILM COATED ORAL at 09:10

## 2024-10-17 RX ADMIN — DEXAMETHASONE 4 MG: 4 TABLET ORAL at 01:10

## 2024-10-17 RX ADMIN — INSULIN ASPART 8 UNITS: 100 INJECTION, SOLUTION INTRAVENOUS; SUBCUTANEOUS at 05:10

## 2024-10-17 RX ADMIN — HYDROCODONE BITARTRATE AND ACETAMINOPHEN 1 TABLET: 10; 325 TABLET ORAL at 09:10

## 2024-10-17 RX ADMIN — HYDRALAZINE HYDROCHLORIDE 10 MG: 20 INJECTION INTRAMUSCULAR; INTRAVENOUS at 02:10

## 2024-10-17 RX ADMIN — TRAZODONE HYDROCHLORIDE 100 MG: 100 TABLET ORAL at 09:10

## 2024-10-17 RX ADMIN — DEXAMETHASONE 4 MG: 4 TABLET ORAL at 09:10

## 2024-10-17 RX ADMIN — DULOXETINE HYDROCHLORIDE 60 MG: 30 CAPSULE, DELAYED RELEASE ORAL at 09:10

## 2024-10-17 RX ADMIN — LABETALOL HYDROCHLORIDE 10 MG: 5 INJECTION, SOLUTION INTRAVENOUS at 08:10

## 2024-10-17 RX ADMIN — INSULIN GLARGINE 15 UNITS: 100 INJECTION, SOLUTION SUBCUTANEOUS at 09:10

## 2024-10-17 RX ADMIN — PANTOPRAZOLE SODIUM 40 MG: 40 TABLET, DELAYED RELEASE ORAL at 09:10

## 2024-10-17 RX ADMIN — LISINOPRIL 20 MG: 20 TABLET ORAL at 09:10

## 2024-10-17 RX ADMIN — DEXAMETHASONE 4 MG: 4 TABLET ORAL at 05:10

## 2024-10-17 NOTE — PT/OT/SLP PROGRESS
"Occupational Therapy   Treatment    Name: Luis Gomes  MRN: [id number]  Admitting Diagnosis:  Brain mass  2 Days Post-Op    Recommendations:     Recommended therapy intensity at discharge: Low Intensity Therapy   Discharge Equipment Recommendations:  none  Barriers to discharge:       Assessment:     Luis Gomes is a 34 y.o. male with a medical diagnosis of brain mass s/p craniotomy with neoplasm excision, hx of seizure disorders, obesity, sleep apnea.   He presents awake, alert, up in chair.  CGA functional mobility today.  One LOB during functional mobility house hold distances but pt able to self correct.    Pt reporting "I feel good today".  Performance deficits affecting function are impaired self care skills, impaired functional mobility, impaired balance, visual deficits, impaired endurance, decreased safety awareness.     Rehab Prognosis:  Good; patient would benefit from acute skilled OT services to address these deficits and reach maximum level of function.       Plan:     Patient to be seen 5 x/week to address the above listed problems via self-care/home management  Plan of Care Expires: 11/16/24  Plan of Care Reviewed with: patient    Subjective     Pain/Comfort:  Pain Rating 1: 0/10    Objective:     Communicated with: RN prior to session.  Patient found up in chair with  (hemovac, vital monitoring) upon OT entry to room.    General Precautions: Standard, seizure (<140/90)    Orthopedic Precautions:N/A  Braces: N/A  Respiratory Status: Room air  Vital Signs: 121/92     Occupational Performance:       Functional Mobility/Transfers:  Patient completed Sit <> Stand Transfer with contact guard assistance  with  no assistive device   Patient completed Toilet Transfer Step Transfer technique with contact guard assistance with  no AD  Functional Mobility: Yifan gait belt applied for safety, CGA today for functional mobility.  Remains with low endurance.  Educated on exercises to increase overall " endurance.    Activities of Daily Living:  Lower Body Dressing: minimum assistance, introduced hip kit reacher and sock aid, good return demonstration.  Continue education  Toileting: max A hygiene due to body habitus, educated on hygiene techniques to promote independence    [facility] 6 Click ADL: 17    Patient Education:  Patient provided with verbal education education regarding OT role/goals/POC, post op precautions, and fall prevention.  Understanding was verbalized, however additional teaching warranted.      Patient left up in chair with all lines intact.  Call bell in reach.    GOALS:   Multidisciplinary Problems       Occupational Therapy Goals          Problem: Occupational Therapy    Goal Priority Disciplines Outcome Interventions   Occupational Therapy Goal     OT, PT/OT Progressing    Description: Goals to be met by: 11/16/24     Patient will increase functional independence with ADLs by performing:    UE Dressing with Modified [place].  LE Dressing with Modified [place] with use of AD.  Grooming while standing at sink with Modified [place].  Toileting from toilet with Modified [place] for hygiene and clothing management, may need AD  Toilet transfer to toilet with Modified [place].                         Time Tracking:     OT Date of Treatment:    OT Start Time: 0843  OT Stop Time: 0857  OT Total Time (min): 14 min    Billable Minutes:Self Care/Home Management 1    OT/JL: OT     Number of JL visits since last OT visit: 1    10/17/2024

## 2024-10-17 NOTE — PT/OT/SLP PROGRESS
"Physical Therapy Treatment    Patient Name:  Luis Gomes   MRN:  [id number]    Recommendations:     Discharge therapy intensity: Low Intensity Therapy   Discharge Equipment Recommendations: none  Barriers to discharge: Ongoing medical needs    Assessment:     Luis Gomes is a 34 y.o. 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 Hx of seizures and bipolar.  He presents with the following impairments/functional limitations: gait instability, impaired balance, impaired self care skills, impaired functional mobility. PT progressed gait to use of SC with good pt response. Overall SBA with single LOB during dynamic activity. PT will follow up 1-2 more sessions to ensure ongoing improvement before signing off.    Rehab Prognosis: Good; patient would benefit from acute skilled PT services to address these deficits and reach maximum level of function.    Recent Surgery: Procedure(s) (LRB):  CRANIOTOMY, WITH NEOPLASM EXCISION USING COMPUTER-ASSISTED NAVIGATION (Right) 2 Days Post-Op    Plan:     During this hospitalization, patient would benefit from acute PT services 3 x/week to address the identified rehab impairments via gait training, therapeutic activities, therapeutic exercises, neuromuscular re-education and progress toward the following goals:    Plan of Care Expires:  11/16/24    Subjective     Chief Complaint: none  Patient/Family Comments/goals: :"ffels so good to get up and walk"  Pain/Comfort:  Pain Rating 1: 0/10  Pain Rating Post-Intervention 1: 0/10      Objective:     Communicated with nurse prior to session.  Patient found up in chair with blood pressure cuff, telemetry upon PT entry to room.     General Precautions: Standard, seizure (BP<140/90)  Orthopedic Precautions: N/A  Braces: N/A  Respiratory Status: Room air  Blood Pressure: 114/67  Skin Integrity:  surgical incision JL      Functional Mobility:  Transfers:     Sit to Stand:  supervision with no AD  Gait: " 200ft with SC CGA. Pt with inconsistent 3 pt gait pattern but overall stable. Single LOB occurred during turn along with simultaneous environmental distraction. Pt able to stabilize with steppage strategy with no increased PT assistance necessary.    Therapeutic Activities/Exercises:  Education and demonstration regarding navigation dynamic environments including stopping movement when turning head or scanning, slower pace in distracting environments, use of SC at all times.    Education:  Patient provided with verbal education and demonstrations education regarding PT role/goals/POC, post-op precautions, fall prevention, and safety awareness.  Understanding was verbalized.     Patient left up in chair with all lines intact, call button in reach, and nurse notified    GOALS:   Multidisciplinary Problems       Physical Therapy Goals          Problem: Physical Therapy    Goal Priority Disciplines Outcome Interventions   Physical Therapy Goal     PT, PT/OT Progressing    Description: Goals to be met by: 24     Patient will increase functional independence with mobility by performin. Supine to sit with [place]  2. Sit to supine with [place]  3. Sit to stand transfer with [place]  4. Gait  x 200 feet with Modified [place] using Rolling Walker vs no AD.                          Time Tracking:     PT Received On: 10/17/24  PT Start Time: 1438     PT Stop Time: 1455  PT Total Time (min): 17 min     Billable Minutes: Gait Training 17min    Treatment Type: Treatment  PT/PTA: PT     Number of PTA visits since last PT visit: 1     10/17/2024

## 2024-10-17 NOTE — H&P
[facility] Hospital Medicine History & Physical Examination       Patient Name: Luis Gomes  MRN: [id number]  Patient Class: IP- Inpatient   Admission Date: 10/17/2024   Admitting Service: Hospital Medicine   Length of Stay: 2  Attending Physician: Dr. Sesay   Primary Care Provider: Clemente Huertas FNP  Face-to-Face encounter date: 10/17/2024  Code Status: Full  Chief Complaint: No chief complaint on file.      Patient information was obtained from patient, patient's family, past medical records and ER records.    HISTORY OF PRESENT ILLNESS:   Luis Gomes is a 34 y.o. male with a PMHx of essential hypertension, hyperlipidemia, bipolar disorder, anxiety, depression, PTSD, JANES noncompliant with CPAP, obesity, seizure disorder, right temporal lobe mass who presented to [facility] on 10/15/2024 for a scheduled right craniotomy by Dr. Cole Serna.  He was admitted to ICU postoperatively.  He briefly required Cleviprex which has since been weaned off.  He does endorsed headaches postoperatively.  Noted to be hyperglycemic.  Is on Decadron 4 mg IV q.8 hours.  PT/OT on board.  Cleared for downgrade to the floor on 10/17/2024.  Hospital medicine consulted for assumption of care and further medical management.    REVIEW OF SYSTEMS:   Except as documented, all other systems reviewed and negative.    PAST MEDICAL HISTORY:   Essential hypertension   Hyperlipidemia  Bipolar disorder  Anxiety   Depression   PTSD  JANES noncompliant with CPAP   Obesity  Seizure disorders   Right temporal lobe mass s/p craniotomy    PAST SURGICAL HISTORY:     Past Surgical History:   Procedure Laterality Date    CRANIOTOMY, WITH NEOPLASM EXCISION USING COMPUTER-ASSISTED NAVIGATION Right 10/15/2024    Procedure: CRANIOTOMY, WITH NEOPLASM EXCISION USING COMPUTER-ASSISTED NAVIGATION;  Surgeon: Cole Smith MD;  Location: [facility];  Service: Neurosurgery;  Laterality: Right;  right temporal crani for excision  of tumor  Stealth // ULTRASOUND // TIVA SETUP  NTI (meps, sseps, eeg, surbcortical stim - need grid)       FAMILY HISTORY:   Reviewed and negative    SOCIAL HISTORY:   Denied alcohol, tobacco or illicit drug use.     SCREENING FOR SOCIAL DRIVERS FOR HEALTH:   Patient screened for food insecurity, housing instability, transportation needs, utility difficulties, and interpersonal safety (select all that apply as identified as concern):  []Housing or Food  []Transportation Needs  []Utility Difficulties  []Interpersonal safety  [x]None    ALLERGIES:   Tree nut    HOME MEDICATIONS:     Prior to Admission medications    Medication Sig Start Date End Date Taking? Authorizing Provider   busPIRone (BUSPAR) 15 MG tablet Take 15 mg by mouth 3 (three) times daily.   Yes Provider, Historical   clonazePAM (KLONOPIN) 1 MG tablet Take 1 mg by mouth 2 (two) times daily as needed for Anxiety.   Yes Provider, Historical   dexAMETHasone (DECADRON) 4 MG Tab Take 1 tablet (4 mg total) by mouth 2 (two) times a day. 9/28/24 10/28/24 Yes Miguel Smith MD   DULoxetine (CYMBALTA) 30 MG capsule Take 30 mg by mouth. 10/2/24  Yes Provider, Historical   DULoxetine (CYMBALTA) 60 MG capsule Take 60 mg by mouth once daily.   Yes Provider, Historical   hydrOXYzine pamoate (VISTARIL) 25 MG Cap Take 25-50 mg by mouth. 9/4/24  Yes Provider, Historical   hydrOXYzine pamoate (VISTARIL) 50 MG Cap Take 50 mg by mouth every 8 (eight) hours as needed. Take one to two capsules by mouth three times daily as needed for anxiety.   Yes Provider, Historical   ibuprofen (ADVIL,MOTRIN) 800 MG tablet Take 800 mg by mouth 3 (three) times daily. 4/24/24  Yes Provider, Historical   levETIRAcetam (KEPPRA) 1000 MG tablet Take 1 tablet (1,000 mg total) by mouth 2 (two) times daily. 9/28/24 10/28/24 Yes Miguel Smith MD   lisinopriL-hydrochlorothiazide (PRINZIDE,ZESTORETIC) 20-12.5 mg per tablet Take 1 tablet by mouth once daily.   Yes Provider, Historical   lurasidone  (LATUDA) 40 mg Tab tablet Take 80 mg by mouth once daily.   Yes Provider, Historical   ondansetron (ZOFRAN-ODT) 4 MG TbDL Take 4 mg by mouth every 6 (six) hours as needed. 8/1/24  Yes Provider, Historical   pantoprazole (PROTONIX) 40 MG tablet Take 1 tablet (40 mg total) by mouth once daily. 9/28/24 10/28/24 Yes Miguel Smith MD   traZODone (DESYREL) 100 MG tablet Take 100 mg by mouth nightly as needed.   Yes Provider, Historical   dicyclomine (BENTYL) 20 mg tablet Take 20 mg by mouth every 6 (six) hours as needed. 5/24/24   Provider, Historical   TRUE METRIX GLUCOSE METER Misc USE TO check glucose BEFORE meals AND AT BEDTIME 10/4/24   Provider, Historical   TRUE METRIX GLUCOSE TEST STRIP Strp 4 (four) times daily. 10/3/24   Provider, Historical   TRUEPLUS LANCETS 30 gauge Misc USE TO check glucose BEFORE meals AND AT BEDTIME 10/3/24   Provider, Historical     ________________________________________________________________________  INPATIENT LIST OF MEDICATIONS     Current Facility-Administered Medications:     0.9%  NaCl infusion, , Intravenous, Continuous, Alverto, Azul, NP, Stopped at 10/16/24 0527    acetaminophen tablet 650 mg, 650 mg, Oral, Q6H PRN, Alverto, Azul, NP    busPIRone tablet 15 mg, 15 mg, Oral, TID, Alverto, Azul, NP, 15 mg at 10/17/24 0922    clonazePAM tablet 1 mg, 1 mg, Oral, BID PRN, Alverto, Azul, NP, 1 mg at 10/16/24 2112    dexAMETHasone tablet 4 mg, 4 mg, Oral, Q8H, Alverto, Azul, NP, 4 mg at 10/17/24 0511    dextrose 10% bolus 125 mL 125 mL, 12.5 g, Intravenous, PRN, Brett Levy, DO    dextrose 10% bolus 125 mL 125 mL, 12.5 g, Intravenous, PRN, Delbert Sesay MD    dextrose 10% bolus 250 mL 250 mL, 25 g, Intravenous, PRN, Brett Levy DO    dextrose 10% bolus 250 mL 250 mL, 25 g, Intravenous, PRN, Shama, MD Delbert    dicyclomine tablet 20 mg, 20 mg, Oral, Q6H PRN, Azul Del Toro NP    DULoxetine DR capsule 60 mg, 60 mg, Oral, Daily, Azul Del Toro NP, 60 mg at 10/17/24 0922     glucagon (human recombinant) injection 1 mg, 1 mg, Intramuscular, PRN, Brett Levy,     glucagon (human recombinant) injection 1 mg, 1 mg, Intramuscular, PRN, Delbert Sesay MD    glucose chewable tablet 16 g, 16 g, Oral, PRN, Brett Levy,     glucose chewable tablet 16 g, 16 g, Oral, PRShama HOGUE Praneet, MD    glucose chewable tablet 24 g, 24 g, Oral, PRN, Brett Levy,     glucose chewable tablet 24 g, 24 g, Oral, PRNShama Praneet, MD    hydrALAZINE injection 10 mg, 10 mg, Intravenous, Q1H PRN, Cole Smith MD, 10 mg at 10/16/24 0914    lisinopriL tablet 20 mg, 20 mg, Oral, Daily, 20 mg at 10/17/24 0922 **AND** hydroCHLOROthiazide tablet 12.5 mg, 12.5 mg, Oral, Daily, Azul Del Toro, NP, 12.5 mg at 10/17/24 0923    HYDROcodone-acetaminophen  mg per tablet 1 tablet, 1 tablet, Oral, Q4H PRN, Azul Del Toro, NP, 1 tablet at 10/17/24 0955    HYDROcodone-acetaminophen 5-325 mg per tablet 1 tablet, 1 tablet, Oral, Q4H PRN, Azul Del Toro, NP    HYDROmorphone (PF) injection 2 mg, 2 mg, Intravenous, Q2H PRN, Cole Smith MD, 2 mg at 10/16/24 0441    hydrOXYzine pamoate capsule 50 mg, 50 mg, Oral, Q8H PRN, Azul Del Toro, NP    insulin aspart U-100 injection 0-10 Units, 0-10 Units, Subcutaneous, QID (AC + HS) PRN, Brett Levy DO, 10 Units at 10/16/24 1712    insulin aspart U-100 injection 0-10 Units, 0-10 Units, Subcutaneous, QID (AC + HS) PRShama HOGUE Praneet, MD    insulin glargine U-100 (Lantus) injection 10 Units, 10 Units, Subcutaneous, QHS, Brett Levy DO, 10 Units at 10/16/24 2118    labetaloL injection 10 mg, 10 mg, Intravenous, Q4H PRN, Brett Levy DO, 10 mg at 10/16/24 0814    levETIRAcetam tablet 1,500 mg, 1,500 mg, Oral, BID, Alverto, Azul, NP, 1,500 mg at 10/17/24 0922    lurasidone tablet 80 mg, 80 mg, Oral, Daily, Alverto, Azul, NP, 80 mg at 10/17/24 1004    mupirocin 2 % ointment, , Nasal, BID, Alverto, Azul, NP, Given at 10/17/24 0923    ondansetron  injection 4 mg, 4 mg, Intravenous, Q6H PRN, Alverto, Azul, NP, 4 mg at 10/16/24 0344    pantoprazole EC tablet 40 mg, 40 mg, Oral, Daily, Alverto, Azul, NP, 40 mg at 10/17/24 0923    prochlorperazine injection Soln 5 mg, 5 mg, Intravenous, Q6H PRN, Alverto, Azul, NP    traZODone tablet 100 mg, 100 mg, Oral, Nightly PRN, Alverto, Azul, NP, 100 mg at 10/16/24 2111    Scheduled Meds:   busPIRone  15 mg Oral TID    dexAMETHasone  4 mg Oral Q8H    DULoxetine  60 mg Oral Daily    lisinopriL  20 mg Oral Daily    And    hydroCHLOROthiazide  12.5 mg Oral Daily    insulin glargine U-100  10 Units Subcutaneous QHS    levETIRAcetam  1,500 mg Oral BID    lurasidone  80 mg Oral Daily    mupirocin   Nasal BID    pantoprazole  40 mg Oral Daily     Continuous Infusions:   0.9% NaCl   Intravenous Continuous   Stopped at 10/16/24 0527     PRN Meds:.  Current Facility-Administered Medications:     acetaminophen, 650 mg, Oral, Q6H PRN    clonazePAM, 1 mg, Oral, BID PRN    dextrose 10%, 12.5 g, Intravenous, PRN    dextrose 10%, 12.5 g, Intravenous, PRN    dextrose 10%, 25 g, Intravenous, PRN    dextrose 10%, 25 g, Intravenous, PRN    dicyclomine, 20 mg, Oral, Q6H PRN    glucagon (human recombinant), 1 mg, Intramuscular, PRN    glucagon (human recombinant), 1 mg, Intramuscular, PRN    glucose, 16 g, Oral, PRN    glucose, 16 g, Oral, PRN    glucose, 24 g, Oral, PRN    glucose, 24 g, Oral, PRN    hydrALAZINE, 10 mg, Intravenous, Q1H PRN    HYDROcodone-acetaminophen, 1 tablet, Oral, Q4H PRN    HYDROcodone-acetaminophen, 1 tablet, Oral, Q4H PRN    HYDROmorphone, 2 mg, Intravenous, Q2H PRN    hydrOXYzine pamoate, 50 mg, Oral, Q8H PRN    insulin aspart U-100, 0-10 Units, Subcutaneous, QID (AC + HS) PRN    insulin aspart U-100, 0-10 Units, Subcutaneous, QID (AC + HS) PRN    labetalol, 10 mg, Intravenous, Q4H PRN    ondansetron, 4 mg, Intravenous, Q6H PRN    prochlorperazine, 5 mg, Intravenous, Q6H PRN    traZODone, 100 mg, Oral, Nightly PRN    PHYSICAL  EXAM:     VITAL SIGNS: 24 HRS MIN & MAX LAST   Temp  Min: 97.8 °F (36.6 °C)  Max: 98.2 °F (36.8 °C) 98.2 °F (36.8 °C)   BP  Min: 90/72  Max: 164/84 (!) 127/92   Pulse  Min: 62  Max: 127  77   Resp  Min: 14  Max: 40 20   SpO2  Min: 93 %  Max: 99 % (!) 94 %       Physical Exam per attending MD    LABS AND IMAGING:     Recent Labs   Lab 10/15/24  1828 10/16/24  0419 10/17/24  0751   WBC 14.46* 22.97* 12.55*   RBC 4.40* 4.54* 4.15*   HGB 14.2 15.1 13.4*   HCT 41.4* 43.4 40.7*   MCV 94.1* 95.6* 98.1*   MCH 32.3* 33.3* 32.3*   MCHC 34.3 34.8 32.9*   RDW 12.8 13.0 13.2    216 154   MPV 9.6 9.5 9.7       Recent Labs   Lab 10/15/24  1828 10/16/24  0419 10/17/24  0751   * 134* 133*   K 4.9 4.4 4.7   CL 98 99 98   CO2 24 23 28   BUN 21.0* 20.6 19.6   CREATININE 0.91 0.95 0.80   CALCIUM 7.7* 8.2* 8.1*   ALBUMIN  --   --  3.1*   ALKPHOS  --   --  47   ALT  --   --  108*   AST  --   --  40*   BILITOT  --   --  1.2       Microbiology Results (last 7 days)       ** No results found for the last 168 hours. **             CT Head Without Contrast  Narrative: EXAMINATION:  CT HEAD WITHOUT CONTRAST    CLINICAL HISTORY:  s/p right temporal craniotomy for tumor excision;    TECHNIQUE:  Multiple axial images were obtained from the base of the brain to the vertex without contrast administration.  Sagittal and coronal reconstructions were performed. .Automatic exposure control  (AEC) is utilized to reduce patient radiation exposure.    COMPARISON:  09/26/2024 and 927 24    FINDINGS:  The patient has undergone recent craniotomy for evacuation of the right temporal and parietal mass as well as the mass extending to the cerebellopontine angle.  There is small amount of hemorrhage seen at the resection site.  There is some edema seen at the resection site.  There is mass effect on the right lateral ventricle and effacement the right lateral ventricle.  No significant midline shift is seen.  There is evidence of pneumo cephaly  seen.  Impression: Extensive postsurgical changes seen as outlined above    Electronically signed by: Kosta Trinh  Date:    10/15/2024  Time:    18:40        ASSESSMENT:   Right temporal lobe mass s/p craniotomy on 10/15/2024   hyperglycemia   Transaminitis    History:  essential hypertension, hyperlipidemia, bipolar disorder, anxiety, depression, PTSD, JANES noncompliant with CPAP, obesity, seizure disorder,      PLAN:   Neurosurgery following  Neuro checks and blood pressure parameters per Neurosurgery recommendations  Accu-Cheks with insulin sliding scale   Continue Long acting insulin at bedtime - Lantus 10 units nightly   Check hemoglobin a1c  MRI brain pending  Resume home medications as deemed appropriate once medication reconciliation is updated  Labs in AM    VTE Prophylaxis:     Discharge Planning and Disposition: TBD    I, Sheila Wilkerson, NP have reviewed and discussed the case with Dr. Sesay.   Please see the attending MD's addendum for further assessment and plan.    Sheila Wilkerson, [facility]-BC  Department of Hospital Medicine   [facility]   10/17/2024    This note was created with the assistance of [employer] Voice Recognition Software. There may be transcription errors as a result of using this technology however minimal, and effort has been made to assure accuracy of transcription, but any obvious errors or omissions should be clarified with the author of the document.    _______________________________________________________________________________  MD Addendum:  I, Dr. Delbert Sesay MD , assumed care of this patient today  For the patient encounter, I performed the substantive portion of the visit, I reviewed the NP/PA documentation, treatment plan, and medical decision making.  I had face to face time with this patient       Patient is status post right temporal craniotomy for tumor excision 10/15/2024, postoperatively patient was admitted to ICU now  downgrading to floors.    Patient postoperative doing well, seen at bedside in ICU 13, he was sitting up in chair denied any focal weakness, reported intermittent slight headaches, denied any nausea vomiting.    Exam   General: LAD   Head:  Surgical incision with staples intact   Lungs: CTA BL   Heart: RRR  Abdomen: Soft nontender   Neuro: Alert, responding appropriately, moving all extremities spontaneously with good strength     MDM     Neurosurgery following  Continue Q 2 neuro checks   Monitor BP closely, recommend to maintain SBP less than 140/90 per Neurosurgery  Continue lisinopril, HCTZ use IV p.r.n.  Monitor sugars, cover with sliding scale, increase Lantus, target glucose 140-180  Pt/ot  Discussed with patient's nurse  Home meds noted      10/17/2024

## 2024-10-17 NOTE — PROGRESS NOTES
[facility] [address]  Neurosurgery  Progress Note    Subjective:     Interval History:   POD #2: Right temporal craniotomy for tumor excision   Patient is sitting up the chair. He complains of soreness around his surgical incision site. He does have headaches, which are controlled with the pain medications. He has been off the cleviprex gtt since yesterday. Blood sugar needs to be better controlled before going home in the next day or two.     Post-Op Info:  Procedure(s) (LRB):  CRANIOTOMY, WITH NEOPLASM EXCISION USING COMPUTER-ASSISTED NAVIGATION (Right)   2 Days Post-Op      Medications:  Continuous Infusions:   0.9% NaCl   Intravenous Continuous   Stopped at 10/16/24 0527    clevidipine  0-16 mg/hr Intravenous Continuous   Stopped at 10/16/24 1603     Scheduled Meds:   busPIRone  15 mg Oral TID    dexAMETHasone  4 mg Oral Q8H    DULoxetine  60 mg Oral Daily    lisinopriL  20 mg Oral Daily    And    hydroCHLOROthiazide  12.5 mg Oral Daily    insulin glargine U-100  10 Units Subcutaneous QHS    levETIRAcetam  1,500 mg Oral BID    lurasidone  80 mg Oral Daily    mupirocin   Nasal BID    pantoprazole  40 mg Oral Daily     PRN Meds:  Current Facility-Administered Medications:     acetaminophen, 650 mg, Oral, Q6H PRN    clonazePAM, 1 mg, Oral, BID PRN    dextrose 10%, 12.5 g, Intravenous, PRN    dextrose 10%, 25 g, Intravenous, PRN    dicyclomine, 20 mg, Oral, Q6H PRN    glucagon (human recombinant), 1 mg, Intramuscular, PRN    glucose, 16 g, Oral, PRN    glucose, 24 g, Oral, PRN    hydrALAZINE, 10 mg, Intravenous, Q1H PRN    HYDROcodone-acetaminophen, 1 tablet, Oral, Q4H PRN    HYDROcodone-acetaminophen, 1 tablet, Oral, Q4H PRN    HYDROmorphone, 2 mg, Intravenous, Q2H PRN    hydrOXYzine pamoate, 50 mg, Oral, Q8H PRN    insulin aspart U-100, 0-10 Units, Subcutaneous, QID (AC + HS) PRN    labetalol, 10 mg, Intravenous, Q4H PRN    ondansetron, 4 mg, Intravenous, Q6H PRN    prochlorperazine, 5 mg,  "Intravenous, Q6H PRN    traZODone, 100 mg, Oral, Nightly PRN     Review of Systems  Objective:     Weight: (!) 157.1 kg (346 lb 5.5 oz)  Body mass index is 48.3 kg/m².  Vital Signs (Most Recent):  Temp: 98.2 °F (36.8 °C) (10/17/24 0400)  Pulse: 77 (10/17/24 0600)  Resp: 20 (10/17/24 0955)  BP: (!) 127/92 (10/17/24 0922)  SpO2: (!) 94 % (10/17/24 0600) Vital Signs (24h Range):  Temp:  [97.8 °F (36.6 °C)-98.2 °F (36.8 °C)] 98.2 °F (36.8 °C)  Pulse:  [] 77  Resp:  [14-40] 20  SpO2:  [93 %-99 %] 94 %  BP: ()/(50-92) 127/92     Date 10/17/24 0700 - 10/18/24 0659   Shift 6415-6580 3698-5668 9519-4833 24 Hour Total   INTAKE   P.O. 480   480   Shift Total(mL/kg) 480(3.1)   480(3.1)   OUTPUT   Urine(mL/kg/hr) 700   700   Shift Total(mL/kg) 700(4.5)   700(4.5)   Weight (kg) 157.1 157.1 157.1 157.1                            Neurosurgery Physical Exam  GCS:  15  E: 4, V: 5, M: 6  Alert and oriented to self, place, month, and year   PERRLA and EOMI  Speech is clear and coherent  No facial droop  No pronator drift  Follow commands  Move all extremities on command  Surgical turban: dry blood, intact  Hemovac drain: serosanguineous  Hemovac output (12hrs/24hrs): 25/75mL    Significant Labs:  Recent Labs   Lab 10/15/24  1828 10/16/24  0419 10/17/24  0751   * 134* 133*   K 4.9 4.4 4.7   CL 98 99 98   CO2 24 23 28   BUN 21.0* 20.6 19.6   CREATININE 0.91 0.95 0.80   CALCIUM 7.7* 8.2* 8.1*     Recent Labs   Lab 10/15/24  1828 10/16/24  0419 10/17/24  0751   WBC 14.46* 22.97* 12.55*   HGB 14.2 15.1 13.4*   HCT 41.4* 43.4 40.7*    216 154     No results for input(s): "LABPT", "INR", "APTT" in the last 48 hours.  Microbiology Results (last 7 days)       ** No results found for the last 168 hours. **          Significant Diagnostics:      Assessment/Plan:    Plan:  - ICU status, ok to downgrade today to hospitalist  - Neuro checks Q4H  - SBP<140/90   - Keppra 1500mg BID  - Pain control  - Decadron 4mg Q8H  - " Discontinue hemovac drain today  - PT, OT  - SCDs for DVT prophylaxis     Active Diagnoses:    Diagnosis Date Noted POA    PRINCIPAL PROBLEM:  Brain mass [G93.89] 10/15/2024 Yes      Problems Resolved During this Admission:       HOLLIE Torres-BC  Neurosurgery  [facility]

## 2024-10-17 NOTE — CARE UPDATE
Patient with low drain output overnight  Drain was taken off of suction. Drain site was prepped with chloroprep and drain was removed  I applied a 3-0 suture to the drain site   Following this, drain site clean and dry  He tolerated this well without complications  OK to leave incision open to air

## 2024-10-18 VITALS
OXYGEN SATURATION: 98 % | SYSTOLIC BLOOD PRESSURE: 110 MMHG | DIASTOLIC BLOOD PRESSURE: 71 MMHG | HEART RATE: 123 BPM | HEIGHT: 71 IN | BODY MASS INDEX: 44.1 KG/M2 | WEIGHT: 315 LBS | RESPIRATION RATE: 18 BRPM | TEMPERATURE: 98 F

## 2024-10-18 LAB
ALBUMIN SERPL-MCNC: 3.2 G/DL (ref 3.5–5)
ALBUMIN/GLOB SERPL: 1 RATIO (ref 1.1–2)
ALP SERPL-CCNC: 46 UNIT/L (ref 40–150)
ALT SERPL-CCNC: 127 UNIT/L (ref 0–55)
ANION GAP SERPL CALC-SCNC: 9 MEQ/L
AST SERPL-CCNC: 49 UNIT/L (ref 5–34)
BASOPHILS # BLD AUTO: 0.01 X10(3)/MCL
BASOPHILS NFR BLD AUTO: 0.1 %
BILIRUB SERPL-MCNC: 1.2 MG/DL
BUN SERPL-MCNC: 18.9 MG/DL (ref 8.9–20.6)
CALCIUM SERPL-MCNC: 8.6 MG/DL (ref 8.4–10.2)
CHLORIDE SERPL-SCNC: 97 MMOL/L (ref 98–107)
CO2 SERPL-SCNC: 28 MMOL/L (ref 22–29)
CREAT SERPL-MCNC: 0.84 MG/DL (ref 0.72–1.25)
CREAT/UREA NIT SERPL: 23
EOSINOPHIL # BLD AUTO: 0 X10(3)/MCL (ref 0–0.9)
EOSINOPHIL NFR BLD AUTO: 0 %
ERYTHROCYTE [DISTWIDTH] IN BLOOD BY AUTOMATED COUNT: 13.1 % (ref 11.5–17)
EST. AVERAGE GLUCOSE BLD GHB EST-MCNC: 171.4 MG/DL
GFR SERPLBLD CREATININE-BSD FMLA CKD-EPI: >60 ML/MIN/1.73/M2
GLOBULIN SER-MCNC: 3.1 GM/DL (ref 2.4–3.5)
GLUCOSE SERPL-MCNC: 235 MG/DL (ref 74–100)
HBA1C MFR BLD: 7.6 %
HCT VFR BLD AUTO: 39.3 % (ref 42–52)
HGB BLD-MCNC: 13.5 G/DL (ref 14–18)
IMM GRANULOCYTES # BLD AUTO: 0.05 X10(3)/MCL (ref 0–0.04)
IMM GRANULOCYTES NFR BLD AUTO: 0.5 %
LYMPHOCYTES # BLD AUTO: 0.86 X10(3)/MCL (ref 0.6–4.6)
LYMPHOCYTES NFR BLD AUTO: 8.5 %
MCH RBC QN AUTO: 32.9 PG (ref 27–31)
MCHC RBC AUTO-ENTMCNC: 34.4 G/DL (ref 33–36)
MCV RBC AUTO: 95.9 FL (ref 80–94)
MONOCYTES # BLD AUTO: 0.66 X10(3)/MCL (ref 0.1–1.3)
MONOCYTES NFR BLD AUTO: 6.5 %
NEUTROPHILS # BLD AUTO: 8.55 X10(3)/MCL (ref 2.1–9.2)
NEUTROPHILS NFR BLD AUTO: 84.4 %
NRBC BLD AUTO-RTO: 0 %
PLATELET # BLD AUTO: 167 X10(3)/MCL (ref 130–400)
PMV BLD AUTO: 9.7 FL (ref 7.4–10.4)
POCT GLUCOSE: 231 MG/DL (ref 70–110)
POCT GLUCOSE: 368 MG/DL (ref 70–110)
POTASSIUM SERPL-SCNC: 4.7 MMOL/L (ref 3.5–5.1)
PROT SERPL-MCNC: 6.3 GM/DL (ref 6.4–8.3)
PSYCHE PATHOLOGY RESULT: NORMAL
RBC # BLD AUTO: 4.1 X10(6)/MCL (ref 4.7–6.1)
SODIUM SERPL-SCNC: 134 MMOL/L (ref 136–145)
WBC # BLD AUTO: 10.13 X10(3)/MCL (ref 4.5–11.5)

## 2024-10-18 PROCEDURE — 99223 1ST HOSP IP/OBS HIGH 75: CPT | Mod: ,,, | Performed by: STUDENT IN AN ORGANIZED HEALTH CARE EDUCATION/TRAINING PROGRAM

## 2024-10-18 PROCEDURE — 83036 HEMOGLOBIN GLYCOSYLATED A1C: CPT | Performed by: NURSE PRACTITIONER

## 2024-10-18 PROCEDURE — 63600175 PHARM REV CODE 636 W HCPCS

## 2024-10-18 PROCEDURE — 36415 COLL VENOUS BLD VENIPUNCTURE: CPT | Performed by: NURSE PRACTITIONER

## 2024-10-18 PROCEDURE — 25000003 PHARM REV CODE 250

## 2024-10-18 PROCEDURE — 80053 COMPREHEN METABOLIC PANEL: CPT | Performed by: NURSE PRACTITIONER

## 2024-10-18 PROCEDURE — 99024 POSTOP FOLLOW-UP VISIT: CPT | Mod: ,,, | Performed by: STUDENT IN AN ORGANIZED HEALTH CARE EDUCATION/TRAINING PROGRAM

## 2024-10-18 PROCEDURE — 85025 COMPLETE CBC W/AUTO DIFF WBC: CPT | Performed by: NURSE PRACTITIONER

## 2024-10-18 RX ORDER — INSULIN GLARGINE 100 [IU]/ML
15 INJECTION, SOLUTION SUBCUTANEOUS NIGHTLY
Qty: 4.5 ML | Refills: 1 | Status: SHIPPED | OUTPATIENT
Start: 2024-10-18 | End: 2024-12-17

## 2024-10-18 RX ORDER — HYDROCODONE BITARTRATE AND ACETAMINOPHEN 5; 325 MG/1; MG/1
1 TABLET ORAL EVERY 4 HOURS PRN
Qty: 20 TABLET | Refills: 0 | Status: SHIPPED | OUTPATIENT
Start: 2024-10-18 | End: 2024-10-25

## 2024-10-18 RX ORDER — DEXAMETHASONE 0.5 MG/1
2 TABLET ORAL EVERY 8 HOURS
Status: DISCONTINUED | OUTPATIENT
Start: 2024-10-18 | End: 2024-10-18 | Stop reason: HOSPADM

## 2024-10-18 RX ORDER — DEXAMETHASONE 2 MG/1
2 TABLET ORAL EVERY 8 HOURS
Qty: 180 TABLET | Refills: 0 | Status: SHIPPED | OUTPATIENT
Start: 2024-10-18 | End: 2024-12-17

## 2024-10-18 RX ORDER — HEPARIN SODIUM 5000 [USP'U]/ML
5000 INJECTION, SOLUTION INTRAVENOUS; SUBCUTANEOUS EVERY 8 HOURS
Status: DISCONTINUED | OUTPATIENT
Start: 2024-10-18 | End: 2024-10-18 | Stop reason: HOSPADM

## 2024-10-18 RX ORDER — METFORMIN HYDROCHLORIDE 850 MG/1
850 TABLET ORAL 2 TIMES DAILY WITH MEALS
Qty: 180 TABLET | Refills: 3 | Status: SHIPPED | OUTPATIENT
Start: 2024-10-18 | End: 2025-10-18

## 2024-10-18 RX ORDER — LEVETIRACETAM 750 MG/1
1500 TABLET ORAL 2 TIMES DAILY
Qty: 120 TABLET | Refills: 2 | Status: SHIPPED | OUTPATIENT
Start: 2024-10-18 | End: 2025-01-16

## 2024-10-18 RX ORDER — INSULIN ASPART 100 [IU]/ML
0-10 INJECTION, SOLUTION INTRAVENOUS; SUBCUTANEOUS
Qty: 10 ML | Refills: 1 | Status: SHIPPED | OUTPATIENT
Start: 2024-10-18 | End: 2025-01-16

## 2024-10-18 RX ADMIN — BUSPIRONE HYDROCHLORIDE 15 MG: 5 TABLET ORAL at 02:10

## 2024-10-18 RX ADMIN — LISINOPRIL 20 MG: 20 TABLET ORAL at 08:10

## 2024-10-18 RX ADMIN — HYDROCODONE BITARTRATE AND ACETAMINOPHEN 1 TABLET: 5; 325 TABLET ORAL at 07:10

## 2024-10-18 RX ADMIN — CLONAZEPAM 1 MG: 1 TABLET ORAL at 07:10

## 2024-10-18 RX ADMIN — DEXAMETHASONE 4 MG: 4 TABLET ORAL at 06:10

## 2024-10-18 RX ADMIN — BUSPIRONE HYDROCHLORIDE 15 MG: 5 TABLET ORAL at 08:10

## 2024-10-18 RX ADMIN — INSULIN ASPART 10 UNITS: 100 INJECTION, SOLUTION INTRAVENOUS; SUBCUTANEOUS at 12:10

## 2024-10-18 RX ADMIN — LURASIDONE HYDROCHLORIDE 80 MG: 40 TABLET, FILM COATED ORAL at 08:10

## 2024-10-18 RX ADMIN — DULOXETINE HYDROCHLORIDE 60 MG: 30 CAPSULE, DELAYED RELEASE ORAL at 08:10

## 2024-10-18 RX ADMIN — LEVETIRACETAM 1500 MG: 500 TABLET, FILM COATED ORAL at 08:10

## 2024-10-18 RX ADMIN — HYDROCHLOROTHIAZIDE 12.5 MG: 12.5 TABLET ORAL at 08:10

## 2024-10-18 RX ADMIN — PANTOPRAZOLE SODIUM 40 MG: 40 TABLET, DELAYED RELEASE ORAL at 08:10

## 2024-10-18 RX ADMIN — DEXAMETHASONE 2 MG: 0.5 TABLET ORAL at 02:10

## 2024-10-18 RX ADMIN — INSULIN ASPART 4 UNITS: 100 INJECTION, SOLUTION INTRAVENOUS; SUBCUTANEOUS at 06:10

## 2024-10-18 NOTE — PROGRESS NOTES
[facility] Neuro  Neurosurgery  Progress Note    Subjective:     Interval History:   POD #3: Right temporal craniotomy for tumor excision   He is sitting up in the chair.  He complains of soreness at his surgical incision site whenever he lays on it.  He has intermittent headaches, which is controlled with his pain medication regimen.  His blood glucose has been between 200-300, which is getting better as his steroid dosage decreases.      Post-Op Info:  Procedure(s) (LRB):  CRANIOTOMY, WITH NEOPLASM EXCISION USING COMPUTER-ASSISTED NAVIGATION (Right)   3 Days Post-Op      Medications:  Continuous Infusions:  Scheduled Meds:   busPIRone  15 mg Oral TID    dexAMETHasone  4 mg Oral Q8H    DULoxetine  60 mg Oral Daily    lisinopriL  20 mg Oral Daily    And    hydroCHLOROthiazide  12.5 mg Oral Daily    insulin glargine U-100  15 Units Subcutaneous QHS    levETIRAcetam  1,500 mg Oral BID    lurasidone  80 mg Oral Daily    pantoprazole  40 mg Oral Daily     PRN Meds:  Current Facility-Administered Medications:     acetaminophen, 650 mg, Oral, Q6H PRN    clonazePAM, 1 mg, Oral, BID PRN    dextrose 10%, 12.5 g, Intravenous, PRN    dextrose 10%, 12.5 g, Intravenous, PRN    dextrose 10%, 25 g, Intravenous, PRN    dextrose 10%, 25 g, Intravenous, PRN    dicyclomine, 20 mg, Oral, Q6H PRN    glucagon (human recombinant), 1 mg, Intramuscular, PRN    glucagon (human recombinant), 1 mg, Intramuscular, PRN    glucose, 16 g, Oral, PRN    glucose, 16 g, Oral, PRN    glucose, 24 g, Oral, PRN    glucose, 24 g, Oral, PRN    hydrALAZINE, 10 mg, Intravenous, Q1H PRN    HYDROcodone-acetaminophen, 1 tablet, Oral, Q4H PRN    HYDROcodone-acetaminophen, 1 tablet, Oral, Q4H PRN    HYDROmorphone, 2 mg, Intravenous, Q2H PRN    hydrOXYzine pamoate, 50 mg, Oral, Q8H PRN    insulin aspart U-100, 0-10 Units, Subcutaneous, QID (AC + HS) PRN    insulin aspart U-100, 0-10 Units, Subcutaneous, QID (AC + HS) PRN    labetalol, 10 mg,  "Intravenous, Q4H PRN    ondansetron, 4 mg, Intravenous, Q6H PRN    prochlorperazine, 5 mg, Intravenous, Q6H PRN    traZODone, 100 mg, Oral, Nightly PRN     Review of Systems  Objective:     Weight: (!) 157.1 kg (346 lb 5.5 oz)  Body mass index is 48.3 kg/m².  Vital Signs (Most Recent):  Temp: 98.1 °F (36.7 °C) (10/18/24 0729)  Pulse: 64 (10/18/24 0729)  Resp: 18 (10/18/24 0732)  BP: 126/85 (10/18/24 0729)  SpO2: (!) 93 % (10/18/24 0729) Vital Signs (24h Range):  Temp:  [97.7 °F (36.5 °C)-98.1 °F (36.7 °C)] 98.1 °F (36.7 °C)  Pulse:  [] 64  Resp:  [17-20] 18  SpO2:  [93 %-98 %] 93 %  BP: (105-164)/(57-85) 126/85            Neurosurgery Physical Exam  GCS:  15  E: 4, V: 5, M: 6  Alert and oriented to self, place, month, and year   PERRLA and EOMI  Speech is clear and coherent  No facial droop  No pronator drift  Follow commands  Move all extremities on command  Surgical incision site clean, dry, intact      Significant Labs:  Recent Labs   Lab 10/17/24  0751 10/18/24  0815   * 134*   K 4.7 4.7   CL 98 97*   CO2 28 28   BUN 19.6 18.9   CREATININE 0.80 0.84   CALCIUM 8.1* 8.6     Recent Labs   Lab 10/17/24  0751   WBC 12.55*   HGB 13.4*   HCT 40.7*        No results for input(s): "LABPT", "INR", "APTT" in the last 48 hours.  Microbiology Results (last 7 days)       ** No results found for the last 168 hours. **            Significant Diagnostics:      Assessment/Plan:    - Floor status  - Neuro checks Q4H  - SBP<160/90   - Keppra 1500mg BID  - Pain control  - Decadron 2mg Q8H (10/18-10/20), 2mg BID (10/21-10/23), 2mg daily on 10/24 until he comes to his follow up appointment in neurosurgery clinic  - PT, OT  - SCDs and subQ heparin for DVT prophylaxis    From neurosurgical standpoint, patient is stable and does not need any additional surgery.  He can be discharge home from Neurosurgery standpoint after his blood sugar is better manage.  He will go home with Keppra 1500 mg b.i.d. and Decadron.  He " will follow up in neurosurgery clinic on 10/30/2024 at 9:00 a.m. for surgical incision checkup.  He will follow up in Neurology Clinic on 10/24/2024 at 1:00 p.m. for his seizures.  Neurosurgery is signing off. Please reach out with any questions or concerns.      Active Diagnoses:    Diagnosis Date Noted POA    PRINCIPAL PROBLEM:  Brain mass [G93.89] 10/15/2024 Yes      Problems Resolved During this Admission:       HOLLIE Torres-BC  Neurosurgery  [facility] Neuro

## 2024-10-18 NOTE — DISCHARGE SUMMARY
[facility] Hospital Medicine  Discharge Summary      Patient Name: Luis Gomes  MRN: [id number]  TAMIR: [id number]  Patient Class: IP- Inpatient  Admission Date: 10/15/2024  Hospital Length of Stay: 3 days  Discharge Date and Time:  10/18/2024 11:18 AM  Attending Physician: Delbert Sesay MD   Discharging Provider: Yohannes Ferraro MD  Primary Care Provider: Clemente Huertas FNP    Primary Care Team: Networked reference to record PCT       Luis Gomes is a 34 y.o. obese male with a PMHx of essential hypertension, hyperlipidemia, bipolar disorder, anxiety, depression, PTSD, JANES noncompliant with CPAP, obesity, seizure disorder, right temporal lobe mass who presented to [facility] on 10/15/2024 for a scheduled right craniotomy by Dr. Cole Serna.  He was admitted to ICU postoperatively.  He briefly required Cleviprex which has since been weaned off.   Cleared for downgrade to the floor on 10/17/2024.  Hospital medicine consulted for assumption of care and further medical management.     NS continued to follow and he mobilized well with no issues. Stay was complicated with steroid induced hyperglycemia requiring insulin. His A1c was 7.6. 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 He will follow with onc as outpatient and will need radiation treatments. NS cleared for dc suggested to keep keppra and decadron. He will follow with Neuro and Neurosurgery. Insulin will be prescribed at dc.       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   Steroid induced Hyperglycemia  T2DM, A1c 7.6      Hx: Obesity, essential hypertension, hyperlipidemia, bipolar disorder, anxiety, depression, PTSD, JANES noncompliant with CPAP, obesity, seizure disorder      Procedure(s) (LRB):  CRANIOTOMY, WITH NEOPLASM EXCISION USING COMPUTER-ASSISTED NAVIGATION (Right)      Vitals:    10/18/24 1129   BP: 110/71   Pulse: (!) 123   Resp:    Temp: 98 °F (36.7 °C)  "      General: Comfortable, not in distress  Respiratory: Clear to auscultation bilaterally, nonlabored breathing  Cardiovascular: RRR, S1, S2  Abdominal: Soft, nontender, nondistended  Neurological: AOx4, no focal deficits  Psychiatric: Cooperative          Goals of Care Treatment Preferences:  Code Status: Full Code      SDOH Screening:  The patient was screened for utility difficulties, food insecurity, transport difficulties, housing insecurity, and interpersonal safety and there were no concerns identified this admission.     Consults:   Consults (From admission, onward)          Status Ordering Provider     Inpatient consult to Social Work/Case Management  Once        Provider:  (Not yet assigned)    Ordered LALITO MAHONEY     Inpatient consult to Radiation Oncology  Once        Provider:  Efra Joshua MD    Acknowledged LEJEUNE, ELIZABETH KENNEDY     Inpatient consult to Oncology  Once        Provider:  Registrar, [facility]-Oncology    Completed KEYONNA LYNCH            No new Assessment & Plan notes have been filed under this hospital service since the last note was generated.  Service: Hospital Medicine    Final Active Diagnoses:    Diagnosis Date Noted POA    PRINCIPAL PROBLEM:  Brain mass [G93.89] 10/15/2024 Yes      Problems Resolved During this Admission:       Discharged Condition: good    Disposition: Home or Self Care    Follow Up:    Patient Instructions:      DIABETIC SUPPLIES FOR HOME USE   Order Comments: Pls provide adequate insulin syringes with above     Order Specific Question Answer Comments   Height: 5' 11" (1.803 m)    Weight: 157.1 kg (346 lb 5.5 oz)    Does patient have medical equipment at home? none    Length of need (1-99 months): 99    Is the Patient insulin dependent? Yes    How many times each day does your Patient test his or her glucose level? TID    Do you follow the Patient at least every 6 months for Management of Diabetes? Yes    Home blood " glucose monitor? Yes    [facility] : Select 1 1    Lancing device? Yes    Blood glucose strips? Yes    [facility] : How many test strips needed? 200    Lancets? Yes    [facility] : How many lancets needed? 200    Control solution? Yes    Alcohol wipes? Yes    [facility] : How much control solution is needed? 1 bottle        Significant Diagnostic Studies: Labs: CMP   Recent Labs   Lab 10/17/24  0751 10/18/24  0815   * 134*   K 4.7 4.7   CL 98 97*   CO2 28 28   BUN 19.6 18.9   CREATININE 0.80 0.84   CALCIUM 8.1* 8.6   ALBUMIN 3.1* 3.2*   BILITOT 1.2 1.2   ALKPHOS 47 46   AST 40* 49*   * 127*       Pending Diagnostic Studies:       Procedure Component Value Units Date/Time    MRI Brain W WO Contrast [[id number]] Resulted: 10/17/24 1320    Order Status: Sent Lab Status: In process Updated: 10/17/24 1335           Medications:  Reconciled Home Medications:      Medication List        START taking these medications      HYDROcodone-acetaminophen 5-325 mg per tablet  Commonly known as: NORCO  Take 1 tablet by mouth every 4 (four) hours as needed for Pain.     insulin aspart U-100 100 unit/mL injection  Commonly known as: NovoLOG  Inject 0-10 Units into the skin before meals and at bedtime as needed for High Blood Sugar.     insulin glargine U-100 (Lantus) 100 unit/mL injection  Inject 15 Units into the skin every evening.     metFORMIN 850 MG tablet  Commonly known as: GLUCOPHAGE  Take 1 tablet (850 mg total) by mouth 2 (two) times daily with meals.            CHANGE how you take these medications      dexAMETHasone 2 MG tablet  Commonly known as: DECADRON  Take 1 tablet (2 mg total) by mouth every 8 (eight) hours.  What changed:   medication strength  how much to take  when to take this     DULoxetine 60 MG capsule  Commonly known as: CYMBALTA  Take 60 mg by mouth once daily.  What changed: Another medication with the same name was removed. Continue taking this medication, and follow the directions you see  here.     hydrOXYzine pamoate 50 MG Cap  Commonly known as: VISTARIL  Take 50 mg by mouth every 8 (eight) hours as needed. Take one to two capsules by mouth three times daily as needed for anxiety.  What changed: Another medication with the same name was removed. Continue taking this medication, and follow the directions you see here.     levETIRAcetam 750 MG Tab  Commonly known as: KEPPRA  Take 2 tablets (1,500 mg total) by mouth 2 (two) times daily.  What changed:   medication strength  how much to take            CONTINUE taking these medications      busPIRone 15 MG tablet  Commonly known as: BUSPAR  Take 15 mg by mouth 3 (three) times daily.     clonazePAM 1 MG tablet  Commonly known as: KlonoPIN  Take 1 mg by mouth 2 (two) times daily as needed for Anxiety.     dicyclomine 20 mg tablet  Commonly known as: BENTYL  Take 20 mg by mouth every 6 (six) hours as needed.     ibuprofen 800 MG tablet  Commonly known as: ADVIL,MOTRIN  Take 800 mg by mouth 3 (three) times daily.     LATUDA 40 mg Tab tablet  Generic drug: lurasidone  Take 80 mg by mouth once daily.     lisinopriL-hydrochlorothiazide 20-12.5 mg per tablet  Commonly known as: PRINZIDE,ZESTORETIC  Take 1 tablet by mouth once daily.     ondansetron 4 MG Tbdl  Commonly known as: ZOFRAN-ODT  Take 4 mg by mouth every 6 (six) hours as needed.     pantoprazole 40 MG tablet  Commonly known as: PROTONIX  Take 1 tablet (40 mg total) by mouth once daily.     traZODone 100 MG tablet  Commonly known as: DESYREL  Take 100 mg by mouth nightly as needed.     TRUE METRIX GLUCOSE METER Misc  Generic drug: blood-glucose meter  USE TO check glucose BEFORE meals AND AT BEDTIME     TRUE METRIX GLUCOSE TEST STRIP Strp  Generic drug: blood sugar diagnostic  4 (four) times daily.     TRUEPLUS LANCETS 30 gauge Misc  Generic drug: lancets  USE TO check glucose BEFORE meals AND AT BEDTIME              Indwelling Lines/Drains at time of discharge:   Lines/Drains/Airways       None                    Time spent on the discharge of patient: 35 minutes         Yohannes Ferraro MD  Department of Hospital Medicine  [facility]

## 2024-10-18 NOTE — PLAN OF CARE
10/18/24 1333   Final Note   Assessment Type Final Discharge Note   Anticipated Discharge Disposition Home-Health   Hospital Resources/Appts/Education Provided Post-Acute resouces added to AVS   Post-Acute Status   Post-Acute Authorization Home Health   Home Health Status Set-up Complete/Auth obtained   Discharge Delays None known at this time     Pt to d/c home with family. Met with pt at bedside to discuss d.c POC and HH services. HH arranged with [facility] utilizing Medicaid rotation; AVS uploaded to [facility].     Concepcion Conte LCSW

## 2024-10-18 NOTE — CONSULTS
[facility]  Hematology/Oncology  Consult Note    Patient Name: Luis Gomes  MRN: [id number]  Admission Date: 10/15/2024  Hospital Length of Stay: 3 days  Attending Provider: Delbert Sesay MD  Consulting Provider: Elizabeth K Lejeune, MD  Principal Problem:Brain mass    Inpatient consult to Oncology  Consult performed by: Lejeune, Elizabeth Kennedy, MD  Consult ordered by: Cole Smith MD        Subjective:     HPI:   35yo with new onset seizure in September '24, workup including CT head revealed concern for CNS mass. He was transferred to [facility] for further evaluation where MRI Brain demonstrated numerous enhancing linear and nodular lesions within the right temporal lobe as well as bilateral enhancing lesions within cerebellar peduncles. He underwent craniotomy for biopsy and partial resection with Dr. Serna on 10/15. 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 IDH mutation status pending. Oncology has been consulted.     This morning he is feeling better. Still with some post op headaches. He lives in [place]. Has good energy.     I discussed his diagnosis and suspected general treatment plan. Explained waiting on further IDH status to finalize therapy plan as well as getting radiation oncology on board. He think they are planning to discharge him today.     Oncology Treatment Plan:   [No matching plan found]    Medications:  Continuous Infusions:    Scheduled Meds:   busPIRone  15 mg Oral TID    dexAMETHasone  2 mg Oral Q8H    DULoxetine  60 mg Oral Daily    heparin (porcine)  5,000 Units Subcutaneous Q8H    lisinopriL  20 mg Oral Daily    And    hydroCHLOROthiazide  12.5 mg Oral Daily    insulin glargine U-100  15 Units Subcutaneous QHS    levETIRAcetam  1,500 mg Oral BID    lurasidone  80 mg Oral Daily    pantoprazole  40 mg Oral Daily     PRN Meds:  Current Facility-Administered Medications:     acetaminophen, 650 mg, Oral, Q6H  PRN    clonazePAM, 1 mg, Oral, BID PRN    dextrose 10%, 12.5 g, Intravenous, PRN    dextrose 10%, 12.5 g, Intravenous, PRN    dextrose 10%, 25 g, Intravenous, PRN    dextrose 10%, 25 g, Intravenous, PRN    dicyclomine, 20 mg, Oral, Q6H PRN    glucagon (human recombinant), 1 mg, Intramuscular, PRN    glucagon (human recombinant), 1 mg, Intramuscular, PRN    glucose, 16 g, Oral, PRN    glucose, 16 g, Oral, PRN    glucose, 24 g, Oral, PRN    glucose, 24 g, Oral, PRN    hydrALAZINE, 10 mg, Intravenous, Q1H PRN    HYDROcodone-acetaminophen, 1 tablet, Oral, Q4H PRN    HYDROcodone-acetaminophen, 1 tablet, Oral, Q4H PRN    HYDROmorphone, 2 mg, Intravenous, Q2H PRN    hydrOXYzine pamoate, 50 mg, Oral, Q8H PRN    insulin aspart U-100, 0-10 Units, Subcutaneous, QID (AC + HS) PRN    insulin aspart U-100, 0-10 Units, Subcutaneous, QID (AC + HS) PRN    labetalol, 10 mg, Intravenous, Q4H PRN    ondansetron, 4 mg, Intravenous, Q6H PRN    prochlorperazine, 5 mg, Intravenous, Q6H PRN    traZODone, 100 mg, Oral, Nightly PRN     Review of patient's allergies indicates:   Allergen Reactions    Tree nut         Past Medical History:   Diagnosis Date    Anxiety     Bipolar disorder 03/04/2022    Brain mass     Depression     Fatty liver     High blood sugar     Insomnia     Posttraumatic stress disorder 03/19/2019    Seizures     Sleep apnea      Past Surgical History:   Procedure Laterality Date    CRANIOTOMY, WITH NEOPLASM EXCISION USING COMPUTER-ASSISTED NAVIGATION Right 10/15/2024    Procedure: CRANIOTOMY, WITH NEOPLASM EXCISION USING COMPUTER-ASSISTED NAVIGATION;  Surgeon: Cole Smith MD;  Location: [facility];  Service: Neurosurgery;  Laterality: Right;  right temporal crani for excision of tumor  Stealth // ULTRASOUND // TIVA SETUP  NTI (meps, sseps, eeg, surbcortical stim - need grid)     Family History       Problem Relation (Age of Onset)    Heart disease Father    Mental illness Mother, Father          Tobacco Use     Smoking status: Every Day     Current packs/day: 0.50     Types: Cigarettes    Smokeless tobacco: Never   Substance and Sexual Activity    Alcohol use: Yes     Comment: beer occ    Drug use: Yes     Types: Marijuana     Comment: medical marijuana    Sexual activity: Not on file       Review of Systems   Constitutional:  Negative for activity change and fatigue.   HENT:  Negative for sore throat.    Eyes:  Negative for visual disturbance.   Respiratory:  Negative for cough and shortness of breath.    Cardiovascular:  Negative for chest pain.   Gastrointestinal:  Negative for abdominal pain, diarrhea, nausea and vomiting.   Genitourinary:  Negative for dysuria.   Skin:  Negative for rash.   Neurological:  Positive for seizures and headaches.   Hematological:  Does not bruise/bleed easily.     Objective:     Vital Signs (Most Recent):  Temp: 98 °F (36.7 °C) (10/18/24 1129)  Pulse: (!) 123 (10/18/24 1129)  Resp: 18 (10/18/24 0732)  BP: 110/71 (10/18/24 1129)  SpO2: 98 % (10/18/24 1129) Vital Signs (24h Range):  Temp:  [97.7 °F (36.5 °C)-98.1 °F (36.7 °C)] 98 °F (36.7 °C)  Pulse:  [] 123  Resp:  [17-18] 18  SpO2:  [93 %-98 %] 98 %  BP: (105-164)/(57-85) 110/71     Weight: (!) 157.1 kg (346 lb 5.5 oz)  Body mass index is 48.3 kg/m².  Body surface area is 2.81 meters squared.      Intake/Output Summary (Last 24 hours) at 10/18/2024 1213  Last data filed at 10/17/2024 1639  Gross per 24 hour   Intake 480 ml   Output 1375 ml   Net -895 ml       Physical Exam  Constitutional:       General: He is not in acute distress.     Appearance: Normal appearance. He is obese. He is not ill-appearing.   HENT:      Head: Normocephalic and atraumatic.      Nose: Nose normal.      Mouth/Throat:      Mouth: Mucous membranes are moist.      Pharynx: Oropharynx is clear.   Eyes:      Extraocular Movements: Extraocular movements intact.      Conjunctiva/sclera: Conjunctivae normal.      Pupils: Pupils are equal, round, and reactive to  light.   Cardiovascular:      Rate and Rhythm: Normal rate and regular rhythm.      Pulses: Normal pulses.      Heart sounds: Normal heart sounds. No murmur heard.  Pulmonary:      Effort: Pulmonary effort is normal. No respiratory distress.      Breath sounds: Normal breath sounds.   Abdominal:      General: There is no distension.      Palpations: Abdomen is soft.      Tenderness: There is no abdominal tenderness.   Musculoskeletal:         General: Normal range of motion.      Cervical back: Normal range of motion and neck supple.      Right lower leg: No edema.      Left lower leg: No edema.   Lymphadenopathy:      Cervical: No cervical adenopathy.   Skin:     General: Skin is warm and dry.   Neurological:      General: No focal deficit present.      Mental Status: He is alert and oriented to person, place, and time.         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 Likely plan for XRT with concurrent and adjuvant TMZ but will discuss with radiation oncology.     Will arrange outpatient follow up with me in my [place] office closer to his home in the next 1-2 weeks.     Consult radiation oncology. If they want to just see him outpatient next week that's okay too.     Thank you for your consult.     Elizabeth K Lejeune, MD  Hematology/Oncology  [facility]  Oncology [facility]

## 2024-10-18 NOTE — DISCHARGE INSTRUCTIONS
Cleanse gluteal cleft with hibiclens solution, pat dry, appply silver alginate (in Aquacel AG wrapper), then apply some dry 4x4in gauze, & tape daily. Try to switch weight around from left to ride side instead of laying flat on back all the time.      Use this moderate sliding scale for Insulin coverage:   mg/dL                  Pre-meal                2200  151-200                2 units                    1 unit  201-250                4 units                    2 units  251-300                6 units                    3 units  301-350                8 units                    4 units  >350                     10 units                  5 units

## 2024-10-18 NOTE — PROGRESS NOTES
FINAL DIAGNOSIS   1. RIGHT TEMPORAL TUMOR:   ANAPLASTIC GEMISTOCYTIC ASTROCYTOMA, WHO GRADE III.  Oncology consult placed

## 2024-10-18 NOTE — PROGRESS NOTES
[facility] Neuro  Wound Care    Patient Name:  Luis Gomes   MRN:  [id number]  Date: 10/18/2024  Diagnosis: Brain mass    History:     Past Medical History:   Diagnosis Date    Anxiety     Bipolar disorder 03/04/2022    Brain mass     Depression     Fatty liver     High blood sugar     Insomnia     Posttraumatic stress disorder 03/19/2019    Seizures     Sleep apnea        Social History     Socioeconomic History    Marital status: Single   Tobacco Use    Smoking status: Every Day     Current packs/day: 0.50     Types: Cigarettes    Smokeless tobacco: Never   Substance and Sexual Activity    Alcohol use: Yes     Comment: beer occ    Drug use: Yes     Types: Marijuana     Comment: medical marijuana     Social Drivers of Health     Financial Resource Strain: Low Risk  (10/16/2024)    Overall Financial Resource Strain (CARDIA)     Difficulty of Paying Living Expenses: Not very hard   Food Insecurity: No Food Insecurity (10/16/2024)    Hunger Vital Sign     Worried About Running Out of Food in the Last Year: Never true     Ran Out of Food in the Last Year: Never true   Transportation Needs: No Transportation Needs (10/16/2024)    TRANSPORTATION NEEDS     Transportation : No   Physical Activity: Inactive (10/16/2024)    Exercise Vital Sign     Days of Exercise per Week: 0 days     Minutes of Exercise per Session: 0 min   Stress: Stress Concern Present (10/16/2024)    [employer] - Occupational Stress Questionnaire     Feeling of Stress : To some extent   Housing Stability: Low Risk  (10/16/2024)    Housing Stability Vital Sign     Unable to Pay for Housing in the Last Year: No     Homeless in the Last Year: No       Precautions:     Allergies as of 10/07/2024 - Reviewed 09/27/2024   Allergen Reaction Noted    Tree nut  09/27/2024       WO Assessment Details/Treatment     Initial visit regarding affected area in deep gluteal cleft, patient is found sitting up in chair ,  introduced self and explained purpose for visit. Patient reports area as chronic irritation, noted very deep gluteal cleft with maceration, cleansed and assessed and redressed with Aquacel ag foam dressing at present. Offered chair cushion , however patient declined .       10/18/24 0800        Wound 09/27/24 1230 Other (comment) lower Sacral spine #1   Date First Assessed/Time First Assessed: 09/27/24 1230   Present on Original Admission: Yes  Primary Wound Type: (c) Other (comment)  Orientation: lower  Location: Sacral spine  Wound Number: #1  Is this injury device related?: No   Wound Image    Dressing Appearance Dried drainage   Drainage Amount Small   Drainage Characteristics/Odor Clear;Serous   Appearance Pink;Red;Moist   Tissue loss description Partial thickness   Red (%), Wound Tissue Color 80 %   Periwound Area Intact;Moist   Wound Edges Undefined   Care Soap and water   Dressing Changed;Calcium alginate;Silver;Foam   Periwound Care Dry periwound area maintained   Dressing Change Due 10/19/24       Recommendations made to primary team for local wound care measures and pressure injury prevention measures . Orders placed.     10/18/2024

## 2024-10-18 NOTE — CONSULTS
Radiation Oncology Consult Note    Date: 10/18/2024    Patient: Luis Gomes  : 1990      Reason for consult:  High grade glioma    HPI:  Mr. Gomes is a 34 year old man with recently diagnosed multifocal high grade glioma.  He presented in 2024 with new onset generalized seizures.  He had imaging of the brain, including MRI brain 2024, revealing heterogenous nodule and linear enhancing lesions centered in the right temporal lobe and insula with confluent involvement of the right thalamus, right cerebellum, and brainstem.  There was an enhancing satellite lesion in the left cerebellar peduncle as well.  These lesions were associated with significant surrounding T2 hyperintensity.  He had CT CAP 2024, which showed no evidence of extracranial malignancy.  He underwent right temporal craniotomy with subtotal resection of tumor on 10/15/2024 with Dr. Serna.  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  He had postoperative MRI brain 10/16/2024, which showed some residual enhancing tumor at the posterior aspect of the resection cavity along with significant residual nonenhancing, FLAIR intense tumor in the medial temporal lobe, brainstem, and cerebellum.  He recovered well postoperatively.  He is ready to go home.  This service is consulted for radiatoin recommendation.    Today, he is accompanied by his mother.  He reports feeling well after surgery.  He denies headache, focal motor weakness, focal sensory deficit.  He does not some dysphagia to liquids and some dysarthria in the last month.  He notes some decreases in visual acuity but no field cuts.  He denies back pain.  He is currently receiving dexamethasone and Keppra.  CBGs have been elevated on dexamethasone, ~ 300s.      PMH: PTSD, BPD, MDD, JANES.   PSH: Craniotomy as noted above.  Soc: Current smoker.  1/2 ppd.  Lives in [place] with family.  3 children, ~ ages 9-15.  FH: Noncontributory.  All:  NKDA  Med: Reviewed.    PE:   Wt: 346 lbs  General: No distress.  Sitting in chair.   Head: Clean, dry, intact right temporal incision with staples in place.    Neuro: AAOx4.  EOMI.  Horizontal gaze nystagmus bilaterally.  Face symmetric.  Tongue, uvula midline.  Normal extremity strength.  Somewhat unsteady gait with cane. Some paraphrasic errors of speech.  Speech fluent.    Extremities: No edema.  Extremities warm.  Skin: No rash.  Resp: Normal WOB.  Lymph: No palpable lymphadenopathy.  Abd: Soft.  No guarding.    Studies: Pertinent labs, imaging, and pathology reviewed and mentioned in HPI.    Assessment:  Mr. Gomes is a 34 year old man with recently diagnosed WHO grade III, gemistocytic anaplastic astrocytoma s/p right temporal craniotomy with subtotal resection of tumor.  IDH status pending.  This service is consulted for radiation recommendation.    I agree with the tentative plan for postoperative radiation with concurrent and adjuvant temozolomide with the goal of improving local control of tumor and overall survival.  I will see him back in my office on 11/1/2024 to follow-up on molecular studies and finalize radiation plan.      I discussed the diagnosis, prognosis, and rationale for the recommendation with Mr. Gomes and his mother.  I reviewed the logistics of radiation.  I reviewed the side effects of radiation with Mr. Gomes and his mother.  I answered their questions.  They indicated their understanding of these things, and they agreed with the plan.    Plan:  F/u with me on 11/1/2024 to finalize radiation plan.  Will obtain MRI brain for radiation planning on or around 11/1/2024.   Anticipate CT simulation 11/1/2024 as long as staples are removed.    F/u Dr. Lejeune as planned.  F/u Dr. Serna as planned.  Dexamethasone, Keppra per hospital team.

## 2024-10-18 NOTE — PLAN OF CARE
Problem: Wound  Goal: Optimal Coping  Outcome: Progressing  Goal: Optimal Functional Ability  Outcome: Progressing  Goal: Absence of Infection Signs and Symptoms  Outcome: Progressing  Goal: Improved Oral Intake  Outcome: Progressing  Goal: Optimal Pain Control and Function  Outcome: Progressing  Goal: Skin Health and Integrity  Outcome: Progressing  Goal: Optimal Wound Healing  Outcome: Progressing     Problem: Adult Inpatient Plan of Care  Goal: Plan of Care Review  Outcome: Progressing  Goal: Patient-Specific Goal (Individualized)  Outcome: Progressing  Goal: Absence of Hospital-Acquired Illness or Injury  Outcome: Progressing  Goal: Optimal Comfort and Wellbeing  Outcome: Progressing  Goal: Readiness for Transition of Care  Outcome: Progressing     Problem: Bariatric Environmental Safety  Goal: Safety Maintained with Care  Outcome: Progressing     Problem: Infection  Goal: Absence of Infection Signs and Symptoms  Outcome: Progressing     Problem: Skin Injury Risk Increased  Goal: Skin Health and Integrity  Outcome: Progressing

## 2024-10-22 NOTE — OP NOTE
NEUROSURGERY OPERATIVE NOTE     DATE OF OPERATION:   10/15/2024     PREOPERATIVE DIAGNOSIS:   Multifocal high grade glioma involving right temporal lobe, basal ganglia, midbrain, brainstem and cerebellum. Brain compression.     POSTOPERATIVE DIAGNOSIS:   Same     SURGEON: Cole Serna MD  ASSISTANT: Azul Del Toro NP     PROCEDURE:  Right temporal craniotomy for tumor debulking.  Use of frameless stereotactic neuronavigation (Stealth) including DTI/Tractography (My-Hammer) software  Use of Neuromonitoring (MEPs, SSEPs, baerS, EEG, ECOG, subcortical stimulation)  Use of intraoperative ultrasound and interpretation of images  Microscope for microdissection     Findings   Tumor debulking, frozen section consistent with high grade glial neoplasm  Stable neuromonitoring     ANESTHESIA:   General endotracheal      BLOOD LOSS:   500 mL     SPECIMEN(s):   Right temporal tumor for frozen and permanent section     COMPLICATIONS:   None      DRAINS:   None     INDICATION   Luis Gomes is a 34-year-old male with history of multiple comorbidities including morbid obesity, sleep apnea and bipolar disorder who presented with first-time seizure.  Neuroimaging demonstrated intrinsic, multifocal brain lesion.  Greatest compromise is in the right temporal lobe where there is significant mass effect on surrounding structures.  I extensively discussed symptoms and imaging findings with the patient and his parents. I recommended craniotomy for debulking in order to obtain tissue diagnosis and for cytoreduction. Risks of surgery were discussed. All questions were answered and informed consent was obtained.     PROCEDURE IN DETAIL  The patient was taken to the operating room where his identity and procedure were confirmed.  He underwent general endotracheal anesthesia. Appropriate IV access, arterial line and Cruz catheter were placed. He was positioned supine in the operating table with his head in a Aguirre head rg turned to the  left side. Stealth neuronavigation was registered and accuracy was confirmed.  A curvilinear incision was planned in his right temporal area.  Hair was clipped and the area was prepped and draped in sterile fashion. He received preoperative antibiotics as well as Keppra, Mannitol and Dexamethasone.  Local anesthetic was injected in the planned incision site.  A surgical time-out was completed with all parties in agreement.     The incision was opened with a #10 blade and dissection carried down to the skull with monopolar cautery.  A myocutaneous flap including the temporalis muscle was elevated and kept in place using lone star hooks.  The craniotomy was planned using stealth.  Mamie holes were placed circumferentially using a  on a high-speed drill.  The dura was then dissected away from the bone using a #3 Penfield. A craniotome was then used to complete and elevate the bone flap. Tackup sutures were placed using #4-0 Nurolon. Ultrasound was used to verify adequate exposure.     The dura was opened in curvilinear fashion and reflected anteriorly. There was evidence of significant cerebral edema. Additional Mannitol was given and the debulking was started expeditiously over the middle temporal gyrus. Abnormal tissue was identified deep to this area and samples were taken for frozen and permanent section. Once some brain relaxation was achieved the Dietz retractor system was placed to aid in visualization. The microscope was brought in for microdissection. A 1x4 grid was placed superiorly for ECOG. A monopolar stimulator was used throughout the debulking for subcortical stimulation, especially along the medial aspect of the cavity.    Debulking continued with a combination of bipolar cautery, suction and Sonopet ultrasonic aspirator. The middle fossa floor was identified inferiorly, the temporal tip was identified anteriorly, and dissection continued posteriorly until normal white matter was  identified. The medial stop point of the debulking was along the corticospinal tract where subcortical stimulation confirmed presence of motor fibers. To prevent focal neurologic deficits the debulking stopped at this point. Ultrasound was used to verify we had achieved some brainstem decompression. Thrombin-soaked cotton balls were placed in the cavity to obtain hemostasis. Fluffy surgicel was then placed over the exposed white matter.The dura was reapproximated using 4-0 Nurolon and a Dura Matrix onlay was placed. The bone flap was replaced with plates and screws. The wound was copiously irrigated.     Closure was then undertaken. The temporalis muscle was re approximated with 2-0 Vicryl . The galea was closed with inverted, interrupted 2-0 Vicryl.  The scalp was closed with Ryan.  The wound was cleaned a sterile dressing and head wrap were applied.  The patient was removed from pins.  He was extubated and transferred to recovery in stable condition.  All needle and sponge counts were correct.       Cole Serna MD  Neurosurgery  [facility]

## 2024-10-24 ENCOUNTER — OFFICE VISIT (OUTPATIENT)
Dept: NEUROLOGY | Facility: CLINIC | Age: 34
End: 2024-10-24
Payer: MEDICAID

## 2024-10-24 VITALS
BODY MASS INDEX: 44.1 KG/M2 | HEIGHT: 71 IN | DIASTOLIC BLOOD PRESSURE: 82 MMHG | SYSTOLIC BLOOD PRESSURE: 118 MMHG | WEIGHT: 315 LBS

## 2024-10-24 DIAGNOSIS — G93.89 BRAIN MASS: ICD-10-CM

## 2024-10-24 DIAGNOSIS — G40.019 LOCALIZATION-RELATED (FOCAL) (PARTIAL) IDIOPATHIC EPILEPSY AND EPILEPTIC SYNDROMES WITH SEIZURES OF LOCALIZED ONSET, INTRACTABLE, WITHOUT STATUS EPILEPTICUS: Primary | ICD-10-CM

## 2024-10-24 PROCEDURE — 99999 PR PBB SHADOW E&M-EST. PATIENT-LVL IV: CPT | Mod: PBBFAC,,, | Performed by: SPECIALIST

## 2024-10-24 PROCEDURE — 1160F RVW MEDS BY RX/DR IN RCRD: CPT | Mod: CPTII,,, | Performed by: SPECIALIST

## 2024-10-24 PROCEDURE — 3008F BODY MASS INDEX DOCD: CPT | Mod: CPTII,,, | Performed by: SPECIALIST

## 2024-10-24 PROCEDURE — 3079F DIAST BP 80-89 MM HG: CPT | Mod: CPTII,,, | Performed by: SPECIALIST

## 2024-10-24 PROCEDURE — 3051F HG A1C>EQUAL 7.0%<8.0%: CPT | Mod: CPTII,,, | Performed by: SPECIALIST

## 2024-10-24 PROCEDURE — 99214 OFFICE O/P EST MOD 30 MIN: CPT | Mod: PBBFAC | Performed by: SPECIALIST

## 2024-10-24 PROCEDURE — 1159F MED LIST DOCD IN RCRD: CPT | Mod: CPTII,,, | Performed by: SPECIALIST

## 2024-10-24 PROCEDURE — 99203 OFFICE O/P NEW LOW 30 MIN: CPT | Mod: S$PBB,,, | Performed by: SPECIALIST

## 2024-10-24 PROCEDURE — 4010F ACE/ARB THERAPY RXD/TAKEN: CPT | Mod: CPTII,,, | Performed by: SPECIALIST

## 2024-10-24 PROCEDURE — 3074F SYST BP LT 130 MM HG: CPT | Mod: CPTII,,, | Performed by: SPECIALIST

## 2024-10-24 PROCEDURE — 1111F DSCHRG MED/CURRENT MED MERGE: CPT | Mod: CPTII,,, | Performed by: SPECIALIST

## 2024-10-24 RX ORDER — LISINOPRIL 30 MG/1
30 TABLET ORAL
COMMUNITY
Start: 2024-05-08

## 2024-10-24 RX ORDER — QUETIAPINE FUMARATE 100 MG/1
100 TABLET, FILM COATED ORAL NIGHTLY
COMMUNITY
Start: 2024-10-23

## 2024-10-24 NOTE — PROGRESS NOTES
"  Neurology     SUBJECTIVE:  Patient ID: Luis Gomes is a 34 y.o. male.  MRN: [id number]    Chief Complaint: NP ref by Abilio GUO for neuro cons to [facility] for Sz.    History of Present Illness:   New patient here today with his mother as new patient to [facility] for seizure.   Presented to [facility] approximately one month ago after a new-onset seizure and subsequently transferred to [facility] for neurosurgical evaluation after a mass was found on CT head.   Seizure described as: body went stiff, could not speak and "was out of it" lasting several minutes. No biting of tongue, urination or defecation on self. He was confused after the Sz.     On 10/15 he underwent R temporal lobe craniotomy with tumor debulking. Reports he had a seizure on the operating table where his "eyes rolled back" but has not had a seizure since.       Does not drive, lives w his parents. MRI's done at [facility].     Currently on decadron 2mg q8h and keppra 1500mg bid    Current AEDs              keppra                Prior AEDs              none    Review of Systems - as per HPI, otherwise a balanced 10 systems review is negative.        Past Medical History:   Diagnosis Date    Anxiety     Bipolar disorder 03/04/2022    Brain mass     Depression     Fatty liver     High blood sugar     Insomnia     Posttraumatic stress disorder 03/19/2019    Seizures     Sleep apnea        Past Surgical History:   Procedure Laterality Date    CRANIOTOMY, WITH NEOPLASM EXCISION USING COMPUTER-ASSISTED NAVIGATION Right 10/15/2024    Procedure: CRANIOTOMY, WITH NEOPLASM EXCISION USING COMPUTER-ASSISTED NAVIGATION;  Surgeon: Cole Smith MD;  Location: [facility];  Service: Neurosurgery;  Laterality: Right;  right temporal crani for excision of tumor  Stealth // ULTRASOUND // TIVA SETUP  NTI (meps, sseps, eeg, surbcortical stim - need grid)       Family History   Problem Relation Name Age of Onset    Mental illness Mother      Mental illness Father      Heart " "disease Father         Social History     Socioeconomic History    Marital status: Single   Tobacco Use    Smoking status: Every Day     Current packs/day: 0.50     Types: Cigarettes    Smokeless tobacco: Never   Substance and Sexual Activity    Alcohol use: Yes     Comment: beer occ    Drug use: Yes     Types: Marijuana     Comment: medical marijuana       Review of patient's allergies indicates:   Allergen Reactions    Tree nut        Current Outpatient Medications   Medication Instructions    busPIRone (BUSPAR) 15 mg, 3 times daily    clonazePAM (KLONOPIN) 1 mg, 2 times daily PRN    dexAMETHasone (DECADRON) 2 mg, Oral, Every 8 hours    dicyclomine (BENTYL) 20 mg, Every 6 hours PRN    DULoxetine (CYMBALTA) 60 mg, Daily    HYDROcodone-acetaminophen (NORCO) 5-325 mg per tablet 1 tablet, Oral, Every 4 hours PRN    hydrOXYzine pamoate (VISTARIL) 50 mg, Every 8 hours PRN    ibuprofen (ADVIL,MOTRIN) 800 mg, 3 times daily    insulin aspart U-100 (NOVOLOG) 0-10 Units, Subcutaneous, Before meals & nightly PRN    insulin glargine U-100 (Lantus) 15 Units, Subcutaneous, Nightly    levETIRAcetam (KEPPRA) 1,500 mg, Oral, 2 times daily    lisinopriL (PRINIVIL,ZESTRIL) 30 mg    lisinopriL-hydrochlorothiazide (PRINZIDE,ZESTORETIC) 20-12.5 mg per tablet 1 tablet, Daily    lurasidone (LATUDA) 80 mg, Daily    metFORMIN (GLUCOPHAGE) 850 mg, Oral, 2 times daily with meals    ondansetron (ZOFRAN-ODT) 4 mg, Every 6 hours PRN    pantoprazole (PROTONIX) 40 mg, Oral, Daily    QUEtiapine (SEROQUEL) 100 mg, Nightly    traZODone (DESYREL) 50 mg, Nightly PRN    TRUE METRIX GLUCOSE METER Misc USE TO check glucose BEFORE meals AND AT BEDTIME    TRUE METRIX GLUCOSE TEST STRIP Strp 4 times daily    TRUEPLUS LANCETS 30 gauge Misc USE TO check glucose BEFORE meals AND AT BEDTIME       OBJECTIVE:  Vitals:  Visit Vitals  /82 (BP Location: Left arm, Patient Position: Sitting)   Ht 5' 11" (1.803 m)   Wt (!) 176.9 kg (390 lb)   BMI 54.39 kg/m² "       Physical Exam   Constitutional: he appears well-developed and well-nourished. he is well groomed. NAD  Accompanied by: mother  Head: Normocephalic and atraumatic  Eyes: Conjunctivae and EOM are normal. L eye mild ptosis.  Cardiovascular: RRR, no murmur  Lungs: pulmonary effort is normal. Clear breath sounds  Musculoskeletal: Range of motion grossly normal  Skin: Skin is warm and dry, no obvious lesions  Psychiatric: Normal mood and affect.     Neuro exam:    Mental status:  The patient is alert and oriented to person, place and time.  Language is intact and fluent  Normal attention and concentration  Cranial Nerves:  Pupils are equal, round, and reactive to light   Extraocular movements are intact and without nystagmus.   Visual fields are full to confrontation testing.   Facial movement is symmetric.  Hearing is intact   Tongue protrudes midline  Coordination:     Finger to nose - normal and symmetric bilaterally  Motor:  Normal muscle bulk and symmetry  5/5 in all extremities  Gait:   Normal gait    Review of Data:   Notes from recent hospital stays reviewed   Labs:  Admission on 10/15/2024, Discharged on 10/18/2024   Component Date Value Ref Range Status    Group & Rh 10/15/2024 A POS   Final    Indirect Norma GEL 10/15/2024 NEG   Final    Specimen Outdate 10/15/2024 10/18/2024 23:59   Final    POCT Glucose 10/15/2024 259 (H)  70 - 110 mg/dL Final    Pathology Result 10/15/2024    Final    Sodium 10/15/2024 132 (L)  136 - 145 mmol/L Final    Potassium 10/15/2024 4.9  3.5 - 5.1 mmol/L Final    Chloride 10/15/2024 98  98 - 107 mmol/L Final    CO2 10/15/2024 24  22 - 29 mmol/L Final    Glucose 10/15/2024 313 (H)  74 - 100 mg/dL Final    Blood Urea Nitrogen 10/15/2024 21.0 (H)  8.9 - 20.6 mg/dL Final    Creatinine 10/15/2024 0.91  0.73 - 1.18 mg/dL Final    BUN/Creatinine Ratio 10/15/2024 23   Final    Calcium 10/15/2024 7.7 (L)  8.4 - 10.2 mg/dL Final    Anion Gap 10/15/2024 10.0  mEq/L Final    eGFR  10/15/2024 >60  mL/min/1.73/m2 Final    WBC 10/15/2024 14.46 (H)  4.50 - 11.50 x10(3)/mcL Final    RBC 10/15/2024 4.40 (L)  4.70 - 6.10 x10(6)/mcL Final    Hgb 10/15/2024 14.2  14.0 - 18.0 g/dL Final    Hct 10/15/2024 41.4 (L)  42.0 - 52.0 % Final    MCV 10/15/2024 94.1 (H)  80.0 - 94.0 fL Final    MCH 10/15/2024 32.3 (H)  27.0 - 31.0 pg Final    MCHC 10/15/2024 34.3  33.0 - 36.0 g/dL Final    RDW 10/15/2024 12.8  11.5 - 17.0 % Final    Platelet 10/15/2024 185  130 - 400 x10(3)/mcL Final    MPV 10/15/2024 9.6  7.4 - 10.4 fL Final    Neut % 10/15/2024 88.5  % Final    Lymph % 10/15/2024 5.3  % Final    Mono % 10/15/2024 5.3  % Final    Eos % 10/15/2024 0.0  % Final    Basophil % 10/15/2024 0.1  % Final    Lymph # 10/15/2024 0.76  0.6 - 4.6 x10(3)/mcL Final    Neut # 10/15/2024 12.81 (H)  2.1 - 9.2 x10(3)/mcL Final    Mono # 10/15/2024 0.76  0.1 - 1.3 x10(3)/mcL Final    Eos # 10/15/2024 0.00  0 - 0.9 x10(3)/mcL Final    Baso # 10/15/2024 0.01  <=0.2 x10(3)/mcL Final    IG# 10/15/2024 0.12 (H)  0 - 0.04 x10(3)/mcL Final    IG% 10/15/2024 0.8  % Final    NRBC% 10/15/2024 0.0  % Final    POCT Glucose 10/15/2024 292 (H)  70 - 110 mg/dL Final    POCT Glucose 10/15/2024 298 (H)  70 - 110 mg/dL Final    Sodium 10/16/2024 134 (L)  136 - 145 mmol/L Final    Potassium 10/16/2024 4.4  3.5 - 5.1 mmol/L Final    Chloride 10/16/2024 99  98 - 107 mmol/L Final    CO2 10/16/2024 23  22 - 29 mmol/L Final    Glucose 10/16/2024 398 (H)  74 - 100 mg/dL Final    Blood Urea Nitrogen 10/16/2024 20.6  8.9 - 20.6 mg/dL Final    Creatinine 10/16/2024 0.95  0.73 - 1.18 mg/dL Final    BUN/Creatinine Ratio 10/16/2024 22   Final    Calcium 10/16/2024 8.2 (L)  8.4 - 10.2 mg/dL Final    Anion Gap 10/16/2024 12.0  mEq/L Final    eGFR 10/16/2024 >60  mL/min/1.73/m2 Final    WBC 10/16/2024 22.97 (H)  4.50 - 11.50 x10(3)/mcL Final    RBC 10/16/2024 4.54 (L)  4.70 - 6.10 x10(6)/mcL Final    Hgb 10/16/2024 15.1  14.0 - 18.0 g/dL Final    Hct 10/16/2024  43.4  42.0 - 52.0 % Final    MCV 10/16/2024 95.6 (H)  80.0 - 94.0 fL Final    MCH 10/16/2024 33.3 (H)  27.0 - 31.0 pg Final    MCHC 10/16/2024 34.8  33.0 - 36.0 g/dL Final    RDW 10/16/2024 13.0  11.5 - 17.0 % Final    Platelet 10/16/2024 216  130 - 400 x10(3)/mcL Final    MPV 10/16/2024 9.5  7.4 - 10.4 fL Final    NRBC% 10/16/2024 0.0  % Final    Neutrophils % 10/16/2024 92 (H)  47 - 80 % Final    Lymphs % 10/16/2024 6 (L)  13 - 40 % Final    Monocytes % 10/16/2024 2  2 - 11 % Final    Neutrophils Abs Calc 10/16/2024 21.1324 (H)  2.1 - 9.2 x10(3)/mcL Final    Lymphs Abs 10/16/2024 1.3782  0.6 - 4.6 x10(3)/mcL Final    Monocytes Abs 10/16/2024 0.4594  0.1 - 1.3 x10(3)/mcL Final    Platelets 10/16/2024 Normal  Normal, Adequate Final    RBC Morph 10/16/2024 Abnormal (A)  Normal Final    Anisocytosis 10/16/2024 1+ (A)  (none) Final    POCT Glucose 10/16/2024 392 (H)  70 - 110 mg/dL Final    POCT Glucose 10/16/2024 443 (H)  70 - 110 mg/dL Final    POCT Glucose 10/16/2024 324 (H)  70 - 110 mg/dL Final    POCT Glucose 10/16/2024 262 (H)  70 - 110 mg/dL Final    Sodium 10/17/2024 133 (L)  136 - 145 mmol/L Final    Potassium 10/17/2024 4.7  3.5 - 5.1 mmol/L Final    Chloride 10/17/2024 98  98 - 107 mmol/L Final    CO2 10/17/2024 28  22 - 29 mmol/L Final    Glucose 10/17/2024 240 (H)  74 - 100 mg/dL Final    Blood Urea Nitrogen 10/17/2024 19.6  8.9 - 20.6 mg/dL Final    Creatinine 10/17/2024 0.80  0.73 - 1.18 mg/dL Final    Calcium 10/17/2024 8.1 (L)  8.4 - 10.2 mg/dL Final    Protein Total 10/17/2024 5.8 (L)  6.4 - 8.3 gm/dL Final    Albumin 10/17/2024 3.1 (L)  3.5 - 5.0 g/dL Final    Globulin 10/17/2024 2.7  2.4 - 3.5 gm/dL Final    Albumin/Globulin Ratio 10/17/2024 1.1  1.1 - 2.0 ratio Final    Bilirubin Total 10/17/2024 1.2  <=1.5 mg/dL Final    ALP 10/17/2024 47  40 - 150 unit/L Final    ALT 10/17/2024 108 (H)  0 - 55 unit/L Final    AST 10/17/2024 40 (H)  5 - 34 unit/L Final    eGFR 10/17/2024 >60  mL/min/1.73/m2 Final     Anion Gap 10/17/2024 7.0  mEq/L Final    BUN/Creatinine Ratio 10/17/2024 25   Final    WBC 10/17/2024 12.55 (H)  4.50 - 11.50 x10(3)/mcL Final    RBC 10/17/2024 4.15 (L)  4.70 - 6.10 x10(6)/mcL Final    Hgb 10/17/2024 13.4 (L)  14.0 - 18.0 g/dL Final    Hct 10/17/2024 40.7 (L)  42.0 - 52.0 % Final    MCV 10/17/2024 98.1 (H)  80.0 - 94.0 fL Final    MCH 10/17/2024 32.3 (H)  27.0 - 31.0 pg Final    MCHC 10/17/2024 32.9 (L)  33.0 - 36.0 g/dL Final    RDW 10/17/2024 13.2  11.5 - 17.0 % Final    Platelet 10/17/2024 154  130 - 400 x10(3)/mcL Final    MPV 10/17/2024 9.7  7.4 - 10.4 fL Final    Neut % 10/17/2024 87.3  % Final    Lymph % 10/17/2024 5.7  % Final    Mono % 10/17/2024 6.4  % Final    Eos % 10/17/2024 0.0  % Final    Basophil % 10/17/2024 0.1  % Final    Lymph # 10/17/2024 0.72  0.6 - 4.6 x10(3)/mcL Final    Neut # 10/17/2024 10.96 (H)  2.1 - 9.2 x10(3)/mcL Final    Mono # 10/17/2024 0.80  0.1 - 1.3 x10(3)/mcL Final    Eos # 10/17/2024 0.00  0 - 0.9 x10(3)/mcL Final    Baso # 10/17/2024 0.01  <=0.2 x10(3)/mcL Final    IG# 10/17/2024 0.06 (H)  0 - 0.04 x10(3)/mcL Final    IG% 10/17/2024 0.5  % Final    NRBC% 10/17/2024 0.0  % Final    POCT Glucose 10/17/2024 237 (H)  70 - 110 mg/dL Final    POCT Glucose 10/17/2024 316 (H)  70 - 110 mg/dL Final    POCT Glucose 10/17/2024 332 (H)  70 - 110 mg/dL Final    Sodium 10/18/2024 134 (L)  136 - 145 mmol/L Final    Potassium 10/18/2024 4.7  3.5 - 5.1 mmol/L Final    Chloride 10/18/2024 97 (L)  98 - 107 mmol/L Final    CO2 10/18/2024 28  22 - 29 mmol/L Final    Glucose 10/18/2024 235 (H)  74 - 100 mg/dL Final    Blood Urea Nitrogen 10/18/2024 18.9  8.9 - 20.6 mg/dL Final    Creatinine 10/18/2024 0.84  0.72 - 1.25 mg/dL Final    Calcium 10/18/2024 8.6  8.4 - 10.2 mg/dL Final    Protein Total 10/18/2024 6.3 (L)  6.4 - 8.3 gm/dL Final    Albumin 10/18/2024 3.2 (L)  3.5 - 5.0 g/dL Final    Globulin 10/18/2024 3.1  2.4 - 3.5 gm/dL Final    Albumin/Globulin Ratio 10/18/2024 1.0  (L)  1.1 - 2.0 ratio Final    Bilirubin Total 10/18/2024 1.2  <=1.5 mg/dL Final    ALP 10/18/2024 46  40 - 150 unit/L Final    ALT 10/18/2024 127 (H)  0 - 55 unit/L Final    AST 10/18/2024 49 (H)  5 - 34 unit/L Final    eGFR 10/18/2024 >60  mL/min/1.73/m2 Final    Anion Gap 10/18/2024 9.0  mEq/L Final    BUN/Creatinine Ratio 10/18/2024 23   Final    Hemoglobin A1c 10/18/2024 7.6 (H)  <=7.0 % Final    Estimated Average Glucose 10/18/2024 171.4  mg/dL Final    WBC 10/18/2024 10.13  4.50 - 11.50 x10(3)/mcL Final    RBC 10/18/2024 4.10 (L)  4.70 - 6.10 x10(6)/mcL Final    Hgb 10/18/2024 13.5 (L)  14.0 - 18.0 g/dL Final    Hct 10/18/2024 39.3 (L)  42.0 - 52.0 % Final    MCV 10/18/2024 95.9 (H)  80.0 - 94.0 fL Final    MCH 10/18/2024 32.9 (H)  27.0 - 31.0 pg Final    MCHC 10/18/2024 34.4  33.0 - 36.0 g/dL Final    RDW 10/18/2024 13.1  11.5 - 17.0 % Final    Platelet 10/18/2024 167  130 - 400 x10(3)/mcL Final    MPV 10/18/2024 9.7  7.4 - 10.4 fL Final    Neut % 10/18/2024 84.4  % Final    Lymph % 10/18/2024 8.5  % Final    Mono % 10/18/2024 6.5  % Final    Eos % 10/18/2024 0.0  % Final    Basophil % 10/18/2024 0.1  % Final    Lymph # 10/18/2024 0.86  0.6 - 4.6 x10(3)/mcL Final    Neut # 10/18/2024 8.55  2.1 - 9.2 x10(3)/mcL Final    Mono # 10/18/2024 0.66  0.1 - 1.3 x10(3)/mcL Final    Eos # 10/18/2024 0.00  0 - 0.9 x10(3)/mcL Final    Baso # 10/18/2024 0.01  <=0.2 x10(3)/mcL Final    IG# 10/18/2024 0.05 (H)  0 - 0.04 x10(3)/mcL Final    IG% 10/18/2024 0.5  % Final    NRBC% 10/18/2024 0.0  % Final    POCT Glucose 10/18/2024 231 (H)  70 - 110 mg/dL Final    POCT Glucose 10/18/2024 368 (H)  70 - 110 mg/dL Final   Lab Visit on 10/14/2024   Component Date Value Ref Range Status    Color, UA 10/14/2024 Light-Yellow  Yellow, Light-Yellow, Colorless, Straw, Dark-Yellow Final    Appearance, UA 10/14/2024 Clear  Clear Final    Specific Gravity, UA 10/14/2024 1.032 (H)  1.005 - 1.030 Final    pH, UA 10/14/2024 6.0  5.0 - 8.5 Final     Protein, UA 10/14/2024 Trace (A)  Negative Final    Glucose, UA 10/14/2024 4+ (A)  Negative, Normal Final    Ketones, UA 10/14/2024 Negative  Negative Final    Blood, UA 10/14/2024 Negative  Negative Final    Bilirubin, UA 10/14/2024 Negative  Negative Final    Urobilinogen, UA 10/14/2024 4.0 (A)  0.2, 1.0, Normal Final    Nitrites, UA 10/14/2024 Negative  Negative Final    Leukocyte Esterase, UA 10/14/2024 Negative  Negative Final    RBC, UA 10/14/2024 0-5  None Seen, 0-2, 3-5, 0-5 /HPF Final    WBC, UA 10/14/2024 0-5  None Seen, 0-2, 3-5, 0-5 /HPF Final    Bacteria, UA 10/14/2024 None Seen  None Seen, Trace /HPF Final    Squamous Epithelial Cells, UA 10/14/2024 Trace  None Seen, Trace, Rare /HPF Final   Office Visit on 10/14/2024   Component Date Value Ref Range Status    QRS Duration 10/14/2024 82  ms Final    [facility] QTC Calculation 10/14/2024 431  ms Final   Admission on 09/27/2024, Discharged on 09/28/2024   Component Date Value Ref Range Status    Sodium 09/27/2024 136  136 - 145 mmol/L Final    Potassium 09/27/2024 4.6  3.5 - 5.1 mmol/L Final    Chloride 09/27/2024 102  98 - 107 mmol/L Final    CO2 09/27/2024 26  22 - 29 mmol/L Final    Glucose 09/27/2024 164 (H)  74 - 100 mg/dL Final    Blood Urea Nitrogen 09/27/2024 13.1  8.9 - 20.6 mg/dL Final    Creatinine 09/27/2024 1.12  0.73 - 1.18 mg/dL Final    Calcium 09/27/2024 9.1  8.4 - 10.2 mg/dL Final    Protein Total 09/27/2024 7.2  6.4 - 8.3 gm/dL Final    Albumin 09/27/2024 3.9  3.5 - 5.0 g/dL Final    Globulin 09/27/2024 3.3  2.4 - 3.5 gm/dL Final    Albumin/Globulin Ratio 09/27/2024 1.2  1.1 - 2.0 ratio Final    Bilirubin Total 09/27/2024 0.4  <=1.5 mg/dL Final    ALP 09/27/2024 65  40 - 150 unit/L Final    ALT 09/27/2024 58 (H)  0 - 55 unit/L Final    AST 09/27/2024 39 (H)  5 - 34 unit/L Final    eGFR 09/27/2024 >60  mL/min/1.73/m2 Final    Anion Gap 09/27/2024 8.0  mEq/L Final    BUN/Creatinine Ratio 09/27/2024 12   Final    PTT 09/27/2024 28.2  23.2 -  33.7 seconds Final    PT 09/27/2024 12.9  12.5 - 14.5 seconds Final    INR 09/27/2024 1.0  <=1.3 Final    Magnesium Level 09/27/2024 2.10  1.60 - 2.60 mg/dL Final    WBC 09/27/2024 8.59  4.50 - 11.50 x10(3)/mcL Final    RBC 09/27/2024 5.03  4.70 - 6.10 x10(6)/mcL Final    Hgb 09/27/2024 16.4  14.0 - 18.0 g/dL Final    Hct 09/27/2024 48.3  42.0 - 52.0 % Final    MCV 09/27/2024 96.0 (H)  80.0 - 94.0 fL Final    MCH 09/27/2024 32.6 (H)  27.0 - 31.0 pg Final    MCHC 09/27/2024 34.0  33.0 - 36.0 g/dL Final    RDW 09/27/2024 12.5  11.5 - 17.0 % Final    Platelet 09/27/2024 207  130 - 400 x10(3)/mcL Final    MPV 09/27/2024 9.1  7.4 - 10.4 fL Final    Neut % 09/27/2024 85.3  % Final    Lymph % 09/27/2024 11.3  % Final    Mono % 09/27/2024 2.2  % Final    Eos % 09/27/2024 0.3  % Final    Basophil % 09/27/2024 0.6  % Final    Lymph # 09/27/2024 0.97  0.6 - 4.6 x10(3)/mcL Final    Neut # 09/27/2024 7.32  2.1 - 9.2 x10(3)/mcL Final    Mono # 09/27/2024 0.19  0.1 - 1.3 x10(3)/mcL Final    Eos # 09/27/2024 0.03  0 - 0.9 x10(3)/mcL Final    Baso # 09/27/2024 0.05  <=0.2 x10(3)/mcL Final    IG# 09/27/2024 0.03  0 - 0.04 x10(3)/mcL Final    IG% 09/27/2024 0.3  % Final    NRBC% 09/27/2024 0.0  % Final    Group & Rh 09/27/2024 A POS   Final    Indirect Norma GEL 09/27/2024 NEG   Final    Specimen Outdate 09/27/2024 09/30/2024 23:59   Final    ABORH Retype 09/27/2024 A POS   Final    Phosphorus Level 09/27/2024 3.4  2.3 - 4.7 mg/dL Final    Sodium 09/28/2024 143  136 - 145 mmol/L Final    Potassium 09/28/2024 4.5  3.5 - 5.1 mmol/L Final    Chloride 09/28/2024 103  98 - 107 mmol/L Final    CO2 09/28/2024 27  22 - 29 mmol/L Final    Glucose 09/28/2024 176 (H)  74 - 100 mg/dL Final    Blood Urea Nitrogen 09/28/2024 18.6  8.9 - 20.6 mg/dL Final    Creatinine 09/28/2024 1.08  0.73 - 1.18 mg/dL Final    Calcium 09/28/2024 9.8  8.4 - 10.2 mg/dL Final    Protein Total 09/28/2024 7.7  6.4 - 8.3 gm/dL Final    Albumin 09/28/2024 4.1  3.5 - 5.0  g/dL Final    Globulin 09/28/2024 3.6 (H)  2.4 - 3.5 gm/dL Final    Albumin/Globulin Ratio 09/28/2024 1.1  1.1 - 2.0 ratio Final    Bilirubin Total 09/28/2024 0.4  <=1.5 mg/dL Final    ALP 09/28/2024 66  40 - 150 unit/L Final    ALT 09/28/2024 58 (H)  0 - 55 unit/L Final    AST 09/28/2024 31  5 - 34 unit/L Final    eGFR 09/28/2024 >60  mL/min/1.73/m2 Final    Anion Gap 09/28/2024 13.0  mEq/L Final    BUN/Creatinine Ratio 09/28/2024 17   Final    Magnesium Level 09/28/2024 2.30  1.60 - 2.60 mg/dL Final    WBC 09/28/2024 14.38 (H)  4.50 - 11.50 x10(3)/mcL Final    RBC 09/28/2024 5.21  4.70 - 6.10 x10(6)/mcL Final    Hgb 09/28/2024 16.9  14.0 - 18.0 g/dL Final    Hct 09/28/2024 50.1  42.0 - 52.0 % Final    MCV 09/28/2024 96.2 (H)  80.0 - 94.0 fL Final    MCH 09/28/2024 32.4 (H)  27.0 - 31.0 pg Final    MCHC 09/28/2024 33.7  33.0 - 36.0 g/dL Final    RDW 09/28/2024 12.4  11.5 - 17.0 % Final    Platelet 09/28/2024 285  130 - 400 x10(3)/mcL Final    MPV 09/28/2024 9.9  7.4 - 10.4 fL Final    Neut % 09/28/2024 88.4  % Final    Lymph % 09/28/2024 7.5  % Final    Mono % 09/28/2024 3.3  % Final    Eos % 09/28/2024 0.0  % Final    Basophil % 09/28/2024 0.1  % Final    Lymph # 09/28/2024 1.08  0.6 - 4.6 x10(3)/mcL Final    Neut # 09/28/2024 12.71 (H)  2.1 - 9.2 x10(3)/mcL Final    Mono # 09/28/2024 0.47  0.1 - 1.3 x10(3)/mcL Final    Eos # 09/28/2024 0.00  0 - 0.9 x10(3)/mcL Final    Baso # 09/28/2024 0.02  <=0.2 x10(3)/mcL Final    IG# 09/28/2024 0.10 (H)  0 - 0.04 x10(3)/mcL Final    IG% 09/28/2024 0.7  % Final    NRBC% 09/28/2024 0.0  % Final    POCT Glucose 09/28/2024 292 (H)  70 - 110 mg/dL Final     Imaging:  Results for orders placed or performed during the hospital encounter of 10/15/24   MRI Brain W WO Contrast    Narrative    EXAMINATION:  MRI BRAIN W WO CONTRAST    CLINICAL HISTORY:  s/p debulking of high grade glioma, assess extent of resection;    TECHNIQUE:  Multiplanar, multisequence MR images of the brain were  obtained with and without administration of intravenous contrast.    COMPARISON:  MRI brain dated 09/27/2024    FINDINGS:  There are postoperative changes following partial resection of a large mass centered in the right temporal lobe.  There is a right temporal lobe resection cavity with associated blood products.  There are a few small foci of adjacent restricted diffusion.  There is a small amount of layering intraventricular hemorrhage.  Postoperative pneumocephalus is noted.    Large residual infiltrative T2 hyperintense mass with scattered ill-defined enhancement centered in the right temporal lobe measures approximately 5.8 x 6.9 cm in size.  There is extension into the brainstem, cerebellar peduncles and basal cisterns.  There are overall similar residual areas of ill-defined enhancement.  An enhancing nodule in the region of the right middle cerebral peduncle measures 8 mm compared to 6 mm previously (series 10, image 113).    There is mass effect with effacement of the basal cisterns and partial effacement of the right lateral ventricle.  There is approximately 3 mm of leftward midline shift compared to 5 mm previously.  There is mild dilatation of the left occipital horn, slightly improved.      Impression    Postoperative changes following partial resection of large infiltrative mass, with slight decrease in overall mass effect.      Electronically signed by: Karena Pan  Date:    10/18/2024  Time:    13:24   CT Head Without Contrast    Narrative    EXAMINATION:  CT HEAD WITHOUT CONTRAST    CLINICAL HISTORY:  s/p right temporal craniotomy for tumor excision;    TECHNIQUE:  Multiple axial images were obtained from the base of the brain to the vertex without contrast administration.  Sagittal and coronal reconstructions were performed. .Automatic exposure control  (AEC) is utilized to reduce patient radiation exposure.    COMPARISON:  09/26/2024 and 927 24    FINDINGS:  The patient has undergone  recent craniotomy for evacuation of the right temporal and parietal mass as well as the mass extending to the cerebellopontine angle.  There is small amount of hemorrhage seen at the resection site.  There is some edema seen at the resection site.  There is mass effect on the right lateral ventricle and effacement the right lateral ventricle.  No significant midline shift is seen.  There is evidence of pneumo cephaly seen.      Impression    Extensive postsurgical changes seen as outlined above      Electronically signed by: Kosta Trinh  Date:    10/15/2024  Time:    18:40          ASSESSMENT:    ICD-10-CM ICD-9-CM   1. 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 Brain mass  G93.89 348.89        PLAN:    Continue the keppra 1500mg BID     - Risks of recurrent seizure discussed. seizure precautions discussed. Pt aware that unlawful to drive in [place] until seizure free at least 6 months.     -Seizure medications can be associated with certain side effects, including memory dysfunction or mood disorders.   -Excessive daytime sleepiness may occur sometimes leading to car crashes.  -Abrupt stoppage of anticonvulsant medications can be medically troublesome.       Do not swim in pool alone     Do not bathe in tub full of water; shower preferred     Avoid medications such as Tramadol, Wellbutrin, Cipro, Levaquin     Avoid ladders/heights     Avoid binge drinking     Avoid sleep deprivation         Virtual visit with Allie 1 month.  In addition to their scheduled follow up, the patient has also been instructed to follow up on as needed basis.     Questions and concerns were sought and answered to the patient's stated verbal satisfaction.    The patient verbalizes understanding and agreement with the above stated treatment plan.   Items discussed include acute and/or chronic neurological,  sleep, or other issues and their attendant differential diagnoses.  Potential for additional testing, treatment options, and prognosis also discussed.    Dr. Hollins physically present in exam room during patient encounter.   I, Allie Ruelas NP acted solely as a scribe for and in the presence of Dr. Hollins who performed the service.    Allie Ruelas, MSN, APRN, FNP-C  [facility]  [phone]

## 2024-10-28 ENCOUNTER — TELEPHONE (OUTPATIENT)
Dept: NEUROSURGERY | Facility: CLINIC | Age: 34
End: 2024-10-28
Payer: MEDICAID

## 2024-10-31 ENCOUNTER — OFFICE VISIT (OUTPATIENT)
Dept: HEMATOLOGY/ONCOLOGY | Facility: CLINIC | Age: 34
End: 2024-10-31
Payer: MEDICAID

## 2024-10-31 VITALS
HEART RATE: 105 BPM | BODY MASS INDEX: 44.1 KG/M2 | RESPIRATION RATE: 18 BRPM | TEMPERATURE: 98 F | WEIGHT: 315 LBS | SYSTOLIC BLOOD PRESSURE: 116 MMHG | DIASTOLIC BLOOD PRESSURE: 80 MMHG | OXYGEN SATURATION: 95 % | HEIGHT: 71 IN

## 2024-10-31 DIAGNOSIS — C71.9 ASTROCYTOMA BRAIN TUMOR: Primary | ICD-10-CM

## 2024-10-31 PROCEDURE — 1159F MED LIST DOCD IN RCRD: CPT | Mod: CPTII,,, | Performed by: STUDENT IN AN ORGANIZED HEALTH CARE EDUCATION/TRAINING PROGRAM

## 2024-10-31 PROCEDURE — 4010F ACE/ARB THERAPY RXD/TAKEN: CPT | Mod: CPTII,,, | Performed by: STUDENT IN AN ORGANIZED HEALTH CARE EDUCATION/TRAINING PROGRAM

## 2024-10-31 PROCEDURE — 3051F HG A1C>EQUAL 7.0%<8.0%: CPT | Mod: CPTII,,, | Performed by: STUDENT IN AN ORGANIZED HEALTH CARE EDUCATION/TRAINING PROGRAM

## 2024-10-31 PROCEDURE — 3008F BODY MASS INDEX DOCD: CPT | Mod: CPTII,,, | Performed by: STUDENT IN AN ORGANIZED HEALTH CARE EDUCATION/TRAINING PROGRAM

## 2024-10-31 PROCEDURE — 3079F DIAST BP 80-89 MM HG: CPT | Mod: CPTII,,, | Performed by: STUDENT IN AN ORGANIZED HEALTH CARE EDUCATION/TRAINING PROGRAM

## 2024-10-31 PROCEDURE — 1160F RVW MEDS BY RX/DR IN RCRD: CPT | Mod: CPTII,,, | Performed by: STUDENT IN AN ORGANIZED HEALTH CARE EDUCATION/TRAINING PROGRAM

## 2024-10-31 PROCEDURE — 3074F SYST BP LT 130 MM HG: CPT | Mod: CPTII,,, | Performed by: STUDENT IN AN ORGANIZED HEALTH CARE EDUCATION/TRAINING PROGRAM

## 2024-10-31 PROCEDURE — 99215 OFFICE O/P EST HI 40 MIN: CPT | Mod: PBBFAC | Performed by: STUDENT IN AN ORGANIZED HEALTH CARE EDUCATION/TRAINING PROGRAM

## 2024-10-31 PROCEDURE — 99999 PR PBB SHADOW E&M-EST. PATIENT-LVL V: CPT | Mod: PBBFAC,,, | Performed by: STUDENT IN AN ORGANIZED HEALTH CARE EDUCATION/TRAINING PROGRAM

## 2024-10-31 PROCEDURE — 99215 OFFICE O/P EST HI 40 MIN: CPT | Mod: S$PBB,,, | Performed by: STUDENT IN AN ORGANIZED HEALTH CARE EDUCATION/TRAINING PROGRAM

## 2024-10-31 RX ORDER — HYDROCODONE BITARTRATE AND ACETAMINOPHEN 5; 325 MG/1; MG/1
1 TABLET ORAL EVERY 4 HOURS PRN
COMMUNITY

## 2024-10-31 RX ORDER — ALPRAZOLAM 0.5 MG/1
TABLET ORAL
COMMUNITY
Start: 2024-10-29

## 2024-10-31 RX ORDER — LISDEXAMFETAMINE DIMESYLATE 50 MG/1
CAPSULE ORAL
COMMUNITY

## 2024-10-31 NOTE — PROGRESS NOTES
[facility]  History & Physical  Neurosurgery      Luis Gomes   [id number]   1990       SUBJECTIVE:     Chief Complaint:  2 week post-op    History of Present Illness:   Luis Gomes is a 34 y.o. male is seen today for a 2 week post-operative follow-up.  He is s/p right temporal craniotomy for tumor excision by Dr. Serna on 10/15/2024.      Prior to surgery the patient reported he coughed, strained, or leaned over, he would experience pressure inside his head, ringing in both ears, and dizziness.  He also complained of blurry vision in both eyes. He had numbness in the right temporal region by his right eye. He experienced paresthesia of the lips and inside the cheeks of his mouth with slurred speech.  During his seizure activity, patient would zoned off and would not make any sense when he smoked.  He had unstable gait and had multiple falls.  He complained of having incontinent every other day when he wakes up in the morning.     The patient returns today reporting his symptoms are better since surgery.  The patient is complaining of pain only at the surgical incision site on his scalp.  He rates his pain 2/10 on a pain scale today. He seems sleepy during the visit. He has his eyes closed while talking to the provider. Patient states he didn't sleep for the last 2-3 days because he is anxious with everything going on. But he feels better now since there is a plan in place for his treatment. He will obtain a MRI brain on November 12 and then he will begin chemotherapy and radiation. Patient mentions of having 42 sessions of chemotherapy and radiation over the course of 6-8 weeks. Overall, a lot of his symptoms has improved since surgery. Patient states he does not feel pressure in his head or ringing in the ears when he coughs or strains now.  He has not experience a seizure activity since he left the hospital.  He recently followed up with Dr. Jason Hollins for his seizure activities.  He  denies blurry vision in his right eye, but his blurry vision in his left eye remains the same. He continues to experience dizziness. He did fall 3-4 days ago when he tripped over his pants because they were too long. The paresthesia of his lips and inside the cheeks of his mouth remains the same. He states his speech is worse and he feels his speech is slower than usual and continues to have slurred speech. His incontinent remains the same since surgery. He feels unstable on his feet. He is working with PT once a week now to help with his gait. He denies any fevers. He denies redness, warmth, drainage, swelling or tenderness to the surgical site.        Past Medical History:   Diagnosis Date    Anxiety     Bipolar disorder 03/04/2022    Brain mass     Depression     Fatty liver     High blood sugar     Insomnia     Posttraumatic stress disorder 03/19/2019    Seizures     Sleep apnea         Past Surgical History:   Procedure Laterality Date    CRANIOTOMY, WITH NEOPLASM EXCISION USING COMPUTER-ASSISTED NAVIGATION Right 10/15/2024    Procedure: CRANIOTOMY, WITH NEOPLASM EXCISION USING COMPUTER-ASSISTED NAVIGATION;  Surgeon: Cole Smith MD;  Location: [facility];  Service: Neurosurgery;  Laterality: Right;  right temporal crani for excision of tumor  Stealth // ULTRASOUND // TIVA SETUP  NTI (meps, sseps, eeg, surbcortical stim - need grid)        Social History     Tobacco Use    Smoking status: Every Day     Current packs/day: 0.50     Types: Vaping with nicotine, Cigarettes    Smokeless tobacco: Never   Substance Use Topics    Alcohol use: Yes     Comment: beer occ        Family History   Problem Relation Name Age of Onset    Mental illness Mother      Mental illness Father      Heart disease Father          Review of patient's allergies indicates:   Allergen Reactions    Tree nut         Current Outpatient Medications   Medication Instructions    albuterol (PROVENTIL/VENTOLIN HFA) 90 mcg/actuation inhaler  inhale TWO puffs EVERY 4 TO 6 HOURS AS NEEDED SHORTNESS OF BREATH wheezing    amoxicillin-clavulanate 875-125mg (AUGMENTIN) 875-125 mg per tablet 1 tablet, 2 times daily    ARIPiprazole (ABILIFY) 10 MG Tab 1 tablet, Daily    ARIPiprazole (ABILIFY) 20 MG Tab 1 tablet, Daily    busPIRone (BUSPAR) 15 MG tablet 1 tablet, 3 times daily    cariprazine (VRAYLAR) 1.5 mg Cap 1 capsule, Every morning    cariprazine (VRAYLAR) 3 mg Cap 1 capsule, Every morning    clindamycin (CLEOCIN) 300 MG capsule 1 capsule, 3 times daily    clonazePAM (KLONOPIN) 1 MG tablet 1 tablet, 2 times daily    dexAMETHasone (DECADRON) 2 MG tablet 1 tablet, Every 8 hours    dexAMETHasone (DECADRON) 4 MG Tab 1 tablet, 2 times daily    dicyclomine (BENTYL) 20 mg, Every 6 hours PRN    DULoxetine (CYMBALTA) 30 MG capsule TAKE ONE CAPSULE BY MOUTH EVERY DAY with 60mg    guanFACINE (INTUNIV ER) 2 mg Tb24 1 tablet, Daily    HYDROcodone-acetaminophen (NORCO) 5-325 mg per tablet 1 tablet, Every 4 hours PRN    hydrOXYzine pamoate (VISTARIL) 25 MG Cap TAKE 1 TO 2 CAPSULES BY MOUTH THREE TIMES DAILY AS NEEDED FOR ANXIETY    hydrOXYzine pamoate (VISTARIL) 50 mg, Every 8 hours PRN    ibuprofen (ADVIL,MOTRIN) 800 mg, 3 times daily    insulin aspart U-100 (NOVOLOG) 0-10 Units, Subcutaneous, Before meals & nightly PRN    insulin glargine U-100 (Lantus) 15 Units, Subcutaneous, Nightly    levETIRAcetam (KEPPRA) 1000 MG tablet 1 tablet, 2 times daily    lisdexamfetamine (VYVANSE) 50 MG capsule TAKE ONE CAPSULE BY MOUTH ONCE EVERY DAY    lisinopriL (PRINIVIL,ZESTRIL) 20 MG tablet 1 tablet, Daily    lisinopriL (PRINIVIL,ZESTRIL) 30 MG tablet 1 tablet, Daily    lisinopriL-hydrochlorothiazide (PRINZIDE,ZESTORETIC) 20-12.5 mg per tablet 1 tablet, Daily    lisinopriL-hydrochlorothiazide (PRINZIDE,ZESTORETIC) 20-12.5 mg per tablet 1 tablet, Daily    lurasidone (LATUDA) 40 mg Tab tablet 1 tablet, Nightly    lurasidone (LATUDA) 80 mg, Daily    metFORMIN (GLUCOPHAGE) 850 MG tablet  "1 tablet, 2 times daily with meals    metFORMIN (GLUCOPHAGE) 850 mg, Oral, 2 times daily with meals    ondansetron (ZOFRAN-ODT) 4 MG TbDL 1 tablet, Every 6 hours PRN    ondansetron (ZOFRAN-ODT) 4 mg, Every 6 hours PRN    pantoprazole (PROTONIX) 40 MG tablet 1 tablet, Daily    phentermine (ADIPEX-P) 37.5 mg tablet 1 tablet, Daily    QUEtiapine (SEROQUEL) 100 MG Tab 1 tablet, Nightly    QUEtiapine (SEROQUEL) 100 mg, Nightly    sulfamethoxazole-trimethoprim 800-160mg (BACTRIM DS) 800-160 mg Tab 1 tablet, 2 times daily    traMADoL (ULTRAM) 50 mg tablet 1 tablet, Every 6 hours PRN    traZODone (DESYREL) 100 MG tablet 1 tablet, Nightly    traZODone (DESYREL) 50 MG tablet TAKE ONE-HALF TO ONE TABLET BY MOUTH AT BEDTIME AS NEEDED FOR SLEEP    traZODone (DESYREL) 50 mg, Nightly PRN    TRUE METRIX GLUCOSE METER Misc USE TO check glucose BEFORE meals AND AT BEDTIME    TRUE METRIX GLUCOSE TEST STRIP Strp 4 times daily    TRUEPLUS LANCETS 30 gauge Misc USE TO check glucose BEFORE meals AND AT BEDTIME    XANAX 0.5 mg tablet Take 1 tablet 3 times a day by oral route as needed, for Anxiety.          Review of Systems   Constitutional: Negative.    HENT: Negative.     Eyes:  Positive for blurred vision.        L eye blurry vision   Respiratory: Negative.     Cardiovascular: Negative.    Gastrointestinal: Negative.    Genitourinary:         Incontinence of voiding   Musculoskeletal:  Positive for falls.   Skin: Negative.    Neurological:  Positive for dizziness and speech change.        Paresthesia of his lips and inside the cheeks of his mouth   Endo/Heme/Allergies: Negative.    Psychiatric/Behavioral:  The patient has insomnia.        OBJECTIVE:       Visit Vitals  /80 (BP Location: Right arm, Patient Position: Sitting)   Pulse 92   Resp 18   Ht 5' 10" (1.778 m)   Wt (!) 181.8 kg (400 lb 12.8 oz)   BMI 57.51 kg/m²            Physical Exam    General:  Pleasant, Well-nourished, Well-groomed.    Head:  Normocephalic without any " obvious abnormality.     Cardiovascular:  Heart has regular rate and rhythm.    Lungs:  Breathing is quiet, non-lablored    Neurological:  Incision healing well, no significant drainage, no dehiscence, no significant erythema. Removal of staples and suture.   Lethargic.  Oriented to person, place, time, and situation.  Speech is slow and intermittently slurred.  Memory, cognition, and affect are appropriate.  Pupils are equal, round, and reactive to light  Extraocular movements are intact.  Paresthesia of the lips.  No facial droop.  Facial sensation intact to light touch.   Gait is unsteady.  Uses a cane.       ASSESSMENT:       ICD-10-CM ICD-9-CM   1. Astrocytoma brain tumor  C71.9 191.9   2. Brain mass  G93.89 348.89        PLAN:     Luis Gomes returns today for his 2 week post-op follow up visit. He will continue to follow up with his oncologist, Dr. Elizabeth Lejeune and his radiation oncologist. He will come back to neurosurgery clinic in 3 months from today with MRI brain with and without contrast (obtain in [place]) after he completes his radiation and chemotherapy treatments.      Follow up in 3 months with MRI brain with and without contrast after completing radiation and chemotherapy.       Azul Del Toro, [facility]  Neurosurgery  [facility]      Disclaimer:  This note is prepared using voice recognition software and as such is likely to have errors despite attempts at proofreading. Please contact me for questions.

## 2024-11-01 ENCOUNTER — OFFICE VISIT (OUTPATIENT)
Dept: NEUROSURGERY | Facility: CLINIC | Age: 34
End: 2024-11-01
Payer: MEDICAID

## 2024-11-01 ENCOUNTER — APPOINTMENT (OUTPATIENT)
Dept: RADIATION THERAPY | Facility: HOSPITAL | Age: 34
End: 2024-11-01
Attending: STUDENT IN AN ORGANIZED HEALTH CARE EDUCATION/TRAINING PROGRAM
Payer: MEDICAID

## 2024-11-01 VITALS
BODY MASS INDEX: 45.1 KG/M2 | WEIGHT: 315 LBS | HEIGHT: 70 IN | HEART RATE: 92 BPM | RESPIRATION RATE: 18 BRPM | SYSTOLIC BLOOD PRESSURE: 128 MMHG | DIASTOLIC BLOOD PRESSURE: 80 MMHG

## 2024-11-01 DIAGNOSIS — C71.9 ASTROCYTOMA BRAIN TUMOR: Primary | ICD-10-CM

## 2024-11-01 DIAGNOSIS — G93.89 BRAIN MASS: ICD-10-CM

## 2024-11-01 PROCEDURE — 1159F MED LIST DOCD IN RCRD: CPT | Mod: CPTII,,,

## 2024-11-01 PROCEDURE — 3079F DIAST BP 80-89 MM HG: CPT | Mod: CPTII,,,

## 2024-11-01 PROCEDURE — 99024 POSTOP FOLLOW-UP VISIT: CPT | Mod: ,,,

## 2024-11-01 PROCEDURE — 3051F HG A1C>EQUAL 7.0%<8.0%: CPT | Mod: CPTII,,,

## 2024-11-01 PROCEDURE — 3074F SYST BP LT 130 MM HG: CPT | Mod: CPTII,,,

## 2024-11-01 PROCEDURE — 4010F ACE/ARB THERAPY RXD/TAKEN: CPT | Mod: CPTII,,,

## 2024-11-01 PROCEDURE — 1160F RVW MEDS BY RX/DR IN RCRD: CPT | Mod: CPTII,,,

## 2024-11-01 RX ORDER — GUANFACINE 2 MG/1
1 TABLET, EXTENDED RELEASE ORAL DAILY
COMMUNITY

## 2024-11-01 RX ORDER — METFORMIN HYDROCHLORIDE 850 MG/1
1 TABLET ORAL 2 TIMES DAILY WITH MEALS
COMMUNITY

## 2024-11-01 RX ORDER — PHENTERMINE HYDROCHLORIDE 37.5 MG/1
1 TABLET ORAL DAILY
COMMUNITY

## 2024-11-01 RX ORDER — TRAZODONE HYDROCHLORIDE 50 MG/1
TABLET ORAL
COMMUNITY

## 2024-11-01 RX ORDER — CLINDAMYCIN HYDROCHLORIDE 300 MG/1
1 CAPSULE ORAL 3 TIMES DAILY
COMMUNITY

## 2024-11-01 RX ORDER — CARIPRAZINE 1.5 MG/1
1 CAPSULE, GELATIN COATED ORAL EVERY MORNING
COMMUNITY

## 2024-11-01 RX ORDER — DEXAMETHASONE 4 MG/1
1 TABLET ORAL 2 TIMES DAILY
COMMUNITY

## 2024-11-01 RX ORDER — ARIPIPRAZOLE 10 MG/1
1 TABLET ORAL DAILY
COMMUNITY

## 2024-11-01 RX ORDER — ALBUTEROL SULFATE 90 UG/1
INHALANT RESPIRATORY (INHALATION)
COMMUNITY

## 2024-11-01 RX ORDER — SULFAMETHOXAZOLE AND TRIMETHOPRIM 800; 160 MG/1; MG/1
1 TABLET ORAL 2 TIMES DAILY
COMMUNITY

## 2024-11-01 RX ORDER — LURASIDONE HYDROCHLORIDE 40 MG/1
1 TABLET, FILM COATED ORAL NIGHTLY
COMMUNITY

## 2024-11-01 RX ORDER — CLONAZEPAM 1 MG/1
1 TABLET ORAL 2 TIMES DAILY
COMMUNITY

## 2024-11-01 RX ORDER — LEVETIRACETAM 1000 MG/1
1 TABLET ORAL 2 TIMES DAILY
COMMUNITY

## 2024-11-01 RX ORDER — CARIPRAZINE 3 MG/1
1 CAPSULE, GELATIN COATED ORAL EVERY MORNING
COMMUNITY

## 2024-11-01 RX ORDER — PANTOPRAZOLE SODIUM 40 MG/1
1 TABLET, DELAYED RELEASE ORAL DAILY
COMMUNITY

## 2024-11-01 RX ORDER — AMOXICILLIN AND CLAVULANATE POTASSIUM 875; 125 MG/1; MG/1
1 TABLET, FILM COATED ORAL 2 TIMES DAILY
COMMUNITY

## 2024-11-01 RX ORDER — LISINOPRIL 20 MG/1
1 TABLET ORAL DAILY
COMMUNITY

## 2024-11-01 RX ORDER — BUSPIRONE HYDROCHLORIDE 15 MG/1
1 TABLET ORAL 3 TIMES DAILY
COMMUNITY

## 2024-11-01 RX ORDER — LISINOPRIL AND HYDROCHLOROTHIAZIDE 12.5; 2 MG/1; MG/1
1 TABLET ORAL DAILY
COMMUNITY

## 2024-11-01 RX ORDER — LISINOPRIL 30 MG/1
1 TABLET ORAL DAILY
COMMUNITY

## 2024-11-01 RX ORDER — QUETIAPINE FUMARATE 100 MG/1
1 TABLET, FILM COATED ORAL NIGHTLY
COMMUNITY

## 2024-11-01 RX ORDER — TRAMADOL HYDROCHLORIDE 50 MG/1
1 TABLET ORAL EVERY 6 HOURS PRN
COMMUNITY

## 2024-11-01 RX ORDER — ONDANSETRON 4 MG/1
1 TABLET, ORALLY DISINTEGRATING ORAL EVERY 6 HOURS PRN
COMMUNITY

## 2024-11-01 RX ORDER — ARIPIPRAZOLE 20 MG/1
1 TABLET ORAL DAILY
COMMUNITY

## 2024-11-01 RX ORDER — HYDROXYZINE PAMOATE 25 MG/1
CAPSULE ORAL
COMMUNITY

## 2024-11-01 RX ORDER — DEXAMETHASONE 2 MG/1
1 TABLET ORAL EVERY 8 HOURS
COMMUNITY

## 2024-11-01 RX ORDER — TRAZODONE HYDROCHLORIDE 100 MG/1
1 TABLET ORAL NIGHTLY
COMMUNITY

## 2024-11-01 RX ORDER — DULOXETIN HYDROCHLORIDE 30 MG/1
CAPSULE, DELAYED RELEASE ORAL
COMMUNITY

## 2024-11-06 NOTE — PROGRESS NOTES
Subjective:       Patient ID: Luis Gomes is a 34 y.o. male.    Chief Complaint: Follow Up    Diagnosis: Grade III Astrocytoma    Current Treatment: None    Treatment History:   10/15/24 Craniotomy- Dr. Cole Serna Zen      HPI  35yo presented as a hospital consultation in October '24 for Grade III Astrocytoma. He initially presented to hospital with new onset seizure in September '24, workup including CT head revealed concern for CNS mass. He was transferred to [facility] for further evaluation where MRI Brain demonstrated numerous enhancing linear and nodular lesions within the right temporal lobe as well as bilateral enhancing lesions within cerebellar peduncles. He underwent craniotomy for biopsy and partial resection with Dr. Serna on 10/15. 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 IDH1 mutated. Plan to proceed with concurrent radiation with temozolamide followed by 1 year maintenance TMZ alone. He understands the palliative nature of his treatments and prognosis has been discussed.     Interval History:   He presents to clinic with his mother for treatment planning following his visit with radiation.  Overall patient is doing well.  He has agreed to proceed with radiation. We discussed the role for temodar, side effects, and toxicity.   Pt reports mild light headedness, unbalance, and fatigue.  Rad/Onc scheduled to start on 11/18, MRI Brain 11/12/24.  Denies SOB, chest pain, fever, chills, headaches    Past Medical History:   Diagnosis Date    Anxiety     Bipolar disorder 03/04/2022    Brain mass     Depression     Fatty liver     High blood sugar     Insomnia     Posttraumatic stress disorder 03/19/2019    Seizures     Sleep apnea       Past Surgical History:   Procedure Laterality Date    CRANIOTOMY, WITH NEOPLASM EXCISION USING COMPUTER-ASSISTED NAVIGATION Right 10/15/2024    Procedure: CRANIOTOMY, WITH NEOPLASM EXCISION USING COMPUTER-ASSISTED  NAVIGATION;  Surgeon: Cole Smith MD;  Location: [facility];  Service: Neurosurgery;  Laterality: Right;  right temporal crani for excision of tumor  Stealth // ULTRASOUND // TIVA SETUP  NTI (meps, sseps, eeg, surbcortical stim - need grid)     Social History     Socioeconomic History    Marital status: Single   Tobacco Use    Smoking status: Every Day     Current packs/day: 0.00     Types: Vaping with nicotine, Cigarettes     Last attempt to quit: 10/2024     Years since quittin.1    Smokeless tobacco: Never   Substance and Sexual Activity    Alcohol use: Yes     Comment: beer occ    Drug use: Yes     Types: Marijuana     Comment: medical marijuana     Social Drivers of Health     Financial Resource Strain: Low Risk  (10/16/2024)    Overall Financial Resource Strain (CARDIA)     Difficulty of Paying Living Expenses: Not very hard   Food Insecurity: No Food Insecurity (10/16/2024)    Hunger Vital Sign     Worried About Running Out of Food in the Last Year: Never true     Ran Out of Food in the Last Year: Never true   Transportation Needs: No Transportation Needs (10/16/2024)    TRANSPORTATION NEEDS     Transportation : No   Physical Activity: Inactive (10/16/2024)    Exercise Vital Sign     Days of Exercise per Week: 0 days     Minutes of Exercise per Session: 0 min   Stress: Stress Concern Present (10/16/2024)    [place] [place] [employer] - Occupational Stress Questionnaire     Feeling of Stress : To some extent   Housing Stability: Low Risk  (10/16/2024)    Housing Stability Vital Sign     Unable to Pay for Housing in the Last Year: No     Homeless in the Last Year: No      Family History   Problem Relation Name Age of Onset    Mental illness Mother      Mental illness Father      Heart disease Father        Review of patient's allergies indicates:   Allergen Reactions    Tree nut       Review of Systems   Constitutional:  Negative for appetite change and unexpected  weight change.   HENT:  Negative for mouth sores.    Eyes:  Positive for visual disturbance.   Respiratory:  Negative for cough and shortness of breath.    Cardiovascular:  Negative for chest pain.   Gastrointestinal:  Negative for abdominal pain and diarrhea.   Genitourinary:  Positive for frequency.   Musculoskeletal:  Negative for back pain.   Integumentary:  Negative for rash.   Neurological:  Negative for headaches.   Hematological:  Negative for adenopathy.   Psychiatric/Behavioral:  The patient is nervous/anxious.          Objective:      Vitals:    11/07/24 1041   BP: 133/85   Pulse: (!) 120   Resp: 16   Temp: 98.2 °F (36.8 °C)         Physical Exam  Constitutional:       General: He is not in acute distress.     Appearance: Normal appearance. He is obese. He is not ill-appearing.   HENT:      Head: Normocephalic and atraumatic.      Nose: Nose normal.      Mouth/Throat:      Mouth: Mucous membranes are moist.      Pharynx: Oropharynx is clear.   Eyes:      Extraocular Movements: Extraocular movements intact.      Conjunctiva/sclera: Conjunctivae normal.      Pupils: Pupils are equal, round, and reactive to light.   Cardiovascular:      Rate and Rhythm: Normal rate and regular rhythm.      Pulses: Normal pulses.      Heart sounds: Normal heart sounds. No murmur heard.  Pulmonary:      Effort: Pulmonary effort is normal. No respiratory distress.      Breath sounds: Normal breath sounds.   Abdominal:      General: There is no distension.      Palpations: Abdomen is soft.      Tenderness: There is no abdominal tenderness.   Musculoskeletal:         General: Normal range of motion.      Cervical back: Normal range of motion and neck supple.      Right lower leg: No edema.      Left lower leg: No edema.   Lymphadenopathy:      Cervical: No cervical adenopathy.   Skin:     General: Skin is warm and dry.   Neurological:      General: No focal deficit present.      Mental Status: He is alert and oriented to person,  place, and time.       LABS AND IMAGING REVIEWED IN EPIC  09/27/24 MRI Brain   1.  Right temporal lobe numerous enhancing linear and nodular lesions which extend medially along midbrain margin and there are additional enhancing lesions bilateral cerebellar peduncles.  Also heterogeneity and some enhancement of the right aspect of midbrain.  Surrounding substantial brain edema, mass effect and midline shift.  Findings are concerning to be neoplasm and could be metastatic.  2.  No acute brain infarct.    Pathology:  10/15/24 Biopsy  1. RIGHT TEMPORAL TUMOR:   ANAPLASTIC GEMISTOCYTIC ASTROCYTOMA, WHO GRADE III.   2. RIGHT TEMPORAL TUMOR PERMANENT:   ANAPLASTIC GEMISTOCYTIC ASTROCYTOMA, WHO GRADE   The following CLINICALLY RELEVANT VARIANT was detected:   Gene:  IDH1   DNA Change:  c.395G>A (Exon 4)   Amino Acid Change:  p.R132H (Arg)132His)   Variant Allele Frequency:  39.9%   NO reportable CLINICALLY RELEVANT SEQUENCE VARIANTS detected within the analyzed   regiohns of TERT gene.      Assessment:   Grade III Astrocytoma - IDH1 mutation positive. Discussed first line recommendation of concurrent RT + TMZ followed by maintenance TMZ x 1 year.     Plan for TMZ 225mg Daily for 42 days straight (including weekends without XRT) with concurrent XRT.  Then repeat MRI brain 4 weeks after completing treatment prior to starting maintenance TMZ.     Plan:       - MRI Brain scheduled 11/12/24 with radiation oncology  - Rx Bactrim DS once daily for PJP prophylaxis while on temodar sent to pharmacy  - Defer steroid taper to radiation oncology  - Temodar 225mg daily Rx sent to specialty pharmacy. Patient instructed to start on day he start radiation.   - RTC 2 week for NP with labs same day for TMZ toxicity check  -CBC CMP    I spent a total of 40 minutes on the day of the visit.This includes face to face time and non-face to face time preparing to see the patient (eg, review of tests), obtaining and/or reviewing separately obtained  history, documenting clinical information in the electronic or other health record, independently interpreting results and communicating results to the patient/family/caregiver, or care coordinator.      Elizabeth Kennedy LeJeune, MD  Hematology/Oncology   [facility]     Professional Services:  Khushi SOTOMAYOR LPN, acted solely as a scribe for and in the presence of Dr. Elizabeth Lejeune, who performed these services

## 2024-11-07 ENCOUNTER — LAB VISIT (OUTPATIENT)
Dept: LAB | Facility: HOSPITAL | Age: 34
End: 2024-11-07
Attending: STUDENT IN AN ORGANIZED HEALTH CARE EDUCATION/TRAINING PROGRAM
Payer: MEDICAID

## 2024-11-07 ENCOUNTER — OFFICE VISIT (OUTPATIENT)
Dept: HEMATOLOGY/ONCOLOGY | Facility: CLINIC | Age: 34
End: 2024-11-07
Payer: MEDICAID

## 2024-11-07 VITALS
HEIGHT: 70 IN | OXYGEN SATURATION: 95 % | HEART RATE: 120 BPM | BODY MASS INDEX: 45.1 KG/M2 | SYSTOLIC BLOOD PRESSURE: 133 MMHG | TEMPERATURE: 98 F | DIASTOLIC BLOOD PRESSURE: 85 MMHG | WEIGHT: 315 LBS | RESPIRATION RATE: 16 BRPM

## 2024-11-07 DIAGNOSIS — E66.01 MORBID OBESITY: ICD-10-CM

## 2024-11-07 DIAGNOSIS — C71.9 ASTROCYTOMA BRAIN TUMOR: Primary | ICD-10-CM

## 2024-11-07 DIAGNOSIS — F41.9 ANXIETY: ICD-10-CM

## 2024-11-07 DIAGNOSIS — C71.9 ASTROCYTOMA BRAIN TUMOR: ICD-10-CM

## 2024-11-07 LAB
ALBUMIN SERPL-MCNC: 3.8 G/DL (ref 3.5–5)
ALBUMIN/GLOB SERPL: 1.2 RATIO (ref 1.1–2)
ALP SERPL-CCNC: 53 UNIT/L (ref 40–150)
ALT SERPL-CCNC: 103 UNIT/L (ref 0–55)
ANION GAP SERPL CALC-SCNC: 8 MEQ/L
AST SERPL-CCNC: 39 UNIT/L (ref 5–34)
BASOPHILS # BLD AUTO: 0.04 X10(3)/MCL
BASOPHILS NFR BLD AUTO: 0.4 %
BILIRUB SERPL-MCNC: 0.4 MG/DL
BUN SERPL-MCNC: 13 MG/DL (ref 8.9–20.6)
CALCIUM SERPL-MCNC: 10.1 MG/DL (ref 8.4–10.2)
CHLORIDE SERPL-SCNC: 99 MMOL/L (ref 98–107)
CO2 SERPL-SCNC: 30 MMOL/L (ref 22–29)
CREAT SERPL-MCNC: 0.91 MG/DL (ref 0.72–1.25)
CREAT/UREA NIT SERPL: 14
EOSINOPHIL # BLD AUTO: 0 X10(3)/MCL (ref 0–0.9)
EOSINOPHIL NFR BLD AUTO: 0 %
ERYTHROCYTE [DISTWIDTH] IN BLOOD BY AUTOMATED COUNT: 13.2 % (ref 11.5–17)
GFR SERPLBLD CREATININE-BSD FMLA CKD-EPI: >60 ML/MIN/1.73/M2
GLOBULIN SER-MCNC: 3.2 GM/DL (ref 2.4–3.5)
GLUCOSE SERPL-MCNC: 307 MG/DL (ref 74–100)
HCT VFR BLD AUTO: 40.4 % (ref 42–52)
HGB BLD-MCNC: 14 G/DL (ref 14–18)
IMM GRANULOCYTES # BLD AUTO: 0.5 X10(3)/MCL (ref 0–0.04)
IMM GRANULOCYTES NFR BLD AUTO: 5.3 %
LYMPHOCYTES # BLD AUTO: 1.03 X10(3)/MCL (ref 0.6–4.6)
LYMPHOCYTES NFR BLD AUTO: 10.9 %
MCH RBC QN AUTO: 32.3 PG (ref 27–31)
MCHC RBC AUTO-ENTMCNC: 34.7 G/DL (ref 33–36)
MCV RBC AUTO: 93.1 FL (ref 80–94)
MONOCYTES # BLD AUTO: 0.48 X10(3)/MCL (ref 0.1–1.3)
MONOCYTES NFR BLD AUTO: 5.1 %
NEUTROPHILS # BLD AUTO: 7.44 X10(3)/MCL (ref 2.1–9.2)
NEUTROPHILS NFR BLD AUTO: 78.3 %
PLATELET # BLD AUTO: 261 X10(3)/MCL (ref 130–400)
PMV BLD AUTO: 9.5 FL (ref 7.4–10.4)
POTASSIUM SERPL-SCNC: 4.5 MMOL/L (ref 3.5–5.1)
PROT SERPL-MCNC: 7 GM/DL (ref 6.4–8.3)
RBC # BLD AUTO: 4.34 X10(6)/MCL (ref 4.7–6.1)
SODIUM SERPL-SCNC: 137 MMOL/L (ref 136–145)
WBC # BLD AUTO: 9.49 X10(3)/MCL (ref 4.5–11.5)

## 2024-11-07 PROCEDURE — 3079F DIAST BP 80-89 MM HG: CPT | Mod: CPTII,,, | Performed by: STUDENT IN AN ORGANIZED HEALTH CARE EDUCATION/TRAINING PROGRAM

## 2024-11-07 PROCEDURE — 1160F RVW MEDS BY RX/DR IN RCRD: CPT | Mod: CPTII,,, | Performed by: STUDENT IN AN ORGANIZED HEALTH CARE EDUCATION/TRAINING PROGRAM

## 2024-11-07 PROCEDURE — 4010F ACE/ARB THERAPY RXD/TAKEN: CPT | Mod: CPTII,,, | Performed by: STUDENT IN AN ORGANIZED HEALTH CARE EDUCATION/TRAINING PROGRAM

## 2024-11-07 PROCEDURE — 85025 COMPLETE CBC W/AUTO DIFF WBC: CPT

## 2024-11-07 PROCEDURE — 99215 OFFICE O/P EST HI 40 MIN: CPT | Mod: S$PBB,,, | Performed by: STUDENT IN AN ORGANIZED HEALTH CARE EDUCATION/TRAINING PROGRAM

## 2024-11-07 PROCEDURE — 1159F MED LIST DOCD IN RCRD: CPT | Mod: CPTII,,, | Performed by: STUDENT IN AN ORGANIZED HEALTH CARE EDUCATION/TRAINING PROGRAM

## 2024-11-07 PROCEDURE — 3051F HG A1C>EQUAL 7.0%<8.0%: CPT | Mod: CPTII,,, | Performed by: STUDENT IN AN ORGANIZED HEALTH CARE EDUCATION/TRAINING PROGRAM

## 2024-11-07 PROCEDURE — 3008F BODY MASS INDEX DOCD: CPT | Mod: CPTII,,, | Performed by: STUDENT IN AN ORGANIZED HEALTH CARE EDUCATION/TRAINING PROGRAM

## 2024-11-07 PROCEDURE — 36415 COLL VENOUS BLD VENIPUNCTURE: CPT

## 2024-11-07 PROCEDURE — 80053 COMPREHEN METABOLIC PANEL: CPT

## 2024-11-07 PROCEDURE — 99999 PR PBB SHADOW E&M-EST. PATIENT-LVL V: CPT | Mod: PBBFAC,,, | Performed by: STUDENT IN AN ORGANIZED HEALTH CARE EDUCATION/TRAINING PROGRAM

## 2024-11-07 PROCEDURE — 3075F SYST BP GE 130 - 139MM HG: CPT | Mod: CPTII,,, | Performed by: STUDENT IN AN ORGANIZED HEALTH CARE EDUCATION/TRAINING PROGRAM

## 2024-11-07 PROCEDURE — 99215 OFFICE O/P EST HI 40 MIN: CPT | Mod: PBBFAC | Performed by: STUDENT IN AN ORGANIZED HEALTH CARE EDUCATION/TRAINING PROGRAM

## 2024-11-07 RX ORDER — TEMOZOLOMIDE 20 MG/1
40 CAPSULE ORAL DAILY
Qty: 84 CAPSULE | Refills: 0 | Status: SHIPPED | OUTPATIENT
Start: 2024-11-07 | End: 2024-12-19

## 2024-11-07 RX ORDER — TEMOZOLOMIDE 180 MG/1
180 CAPSULE ORAL DAILY
Qty: 42 CAPSULE | Refills: 0 | Status: SHIPPED | OUTPATIENT
Start: 2024-11-07 | End: 2024-12-19

## 2024-11-07 RX ORDER — TEMOZOLOMIDE 5 MG/1
5 CAPSULE ORAL DAILY
Qty: 42 CAPSULE | Refills: 0 | Status: SHIPPED | OUTPATIENT
Start: 2024-11-07 | End: 2024-11-07

## 2024-11-07 RX ORDER — TEMOZOLOMIDE 20 MG/1
40 CAPSULE ORAL DAILY
Qty: 84 CAPSULE | Refills: 0 | Status: SHIPPED | OUTPATIENT
Start: 2024-11-07 | End: 2024-11-07

## 2024-11-07 RX ORDER — TEMOZOLOMIDE 5 MG/1
5 CAPSULE ORAL DAILY
Qty: 42 CAPSULE | Refills: 0 | Status: SHIPPED | OUTPATIENT
Start: 2024-11-07 | End: 2024-12-19

## 2024-11-07 RX ORDER — TEMOZOLOMIDE 180 MG/1
180 CAPSULE ORAL DAILY
Qty: 42 CAPSULE | Refills: 0 | Status: SHIPPED | OUTPATIENT
Start: 2024-11-07 | End: 2024-11-07

## 2024-11-07 RX ORDER — SULFAMETHOXAZOLE AND TRIMETHOPRIM 800; 160 MG/1; MG/1
1 TABLET ORAL DAILY
Qty: 42 TABLET | Refills: 0 | Status: SHIPPED | OUTPATIENT
Start: 2024-11-07

## 2024-11-11 ENCOUNTER — TELEPHONE (OUTPATIENT)
Dept: NEUROSURGERY | Facility: CLINIC | Age: 34
End: 2024-11-11
Payer: MEDICAID

## 2024-11-11 NOTE — TELEPHONE ENCOUNTER
Patient called. He is s/p right temporal craniotomy for tumor excision by Dr. Serna on 10/15/2024. He had BP issues in the hospital and was put on medication. Since discharge, his PCP has been monitoring BP & medications. He wanted to let us know his BP was elevated over the weekend despite the medication. He denies headaches. I encouraged him to call his PCP, which he said he did before calling us. He also wanted to let us know that since this weekend, he's had visual changes (can't see as well with or without glasses) and he's also having visual hallucinations such as seeing faces, shadows and lines in his peripheral vision. The hallucinations occur randomly. I told him to mention this to his PCP as well. Anything to do on our end about the visual changes?

## 2024-11-12 PROCEDURE — 77334 RADIATION TREATMENT AID(S): CPT | Performed by: RADIOLOGY

## 2024-11-22 DIAGNOSIS — T45.1X5A CHEMOTHERAPY-INDUCED NAUSEA: ICD-10-CM

## 2024-11-22 DIAGNOSIS — C71.9 ASTROCYTOMA BRAIN TUMOR: Primary | ICD-10-CM

## 2024-11-22 DIAGNOSIS — R11.0 CHEMOTHERAPY-INDUCED NAUSEA: ICD-10-CM

## 2024-11-22 RX ORDER — ONDANSETRON 4 MG/1
4 TABLET, ORALLY DISINTEGRATING ORAL EVERY 6 HOURS PRN
Qty: 30 TABLET | Refills: 3 | Status: SHIPPED | OUTPATIENT
Start: 2024-11-22

## 2024-11-22 RX ORDER — AMOXICILLIN AND CLAVULANATE POTASSIUM 875; 125 MG/1; MG/1
1 TABLET, FILM COATED ORAL 2 TIMES DAILY
Qty: 42 TABLET | Refills: 0 | Status: SHIPPED | OUTPATIENT
Start: 2024-11-22

## 2024-11-25 ENCOUNTER — OFFICE VISIT (OUTPATIENT)
Dept: HEMATOLOGY/ONCOLOGY | Facility: CLINIC | Age: 34
End: 2024-11-25
Payer: MEDICAID

## 2024-11-25 ENCOUNTER — LAB VISIT (OUTPATIENT)
Dept: LAB | Facility: HOSPITAL | Age: 34
End: 2024-11-25
Payer: MEDICAID

## 2024-11-25 VITALS
OXYGEN SATURATION: 96 % | WEIGHT: 315 LBS | RESPIRATION RATE: 16 BRPM | BODY MASS INDEX: 45.1 KG/M2 | HEIGHT: 70 IN | SYSTOLIC BLOOD PRESSURE: 138 MMHG | HEART RATE: 82 BPM | DIASTOLIC BLOOD PRESSURE: 95 MMHG | TEMPERATURE: 98 F

## 2024-11-25 DIAGNOSIS — T45.1X5A CHEMOTHERAPY-INDUCED NAUSEA: Primary | ICD-10-CM

## 2024-11-25 DIAGNOSIS — E66.01 MORBID OBESITY: ICD-10-CM

## 2024-11-25 DIAGNOSIS — R11.0 CHEMOTHERAPY-INDUCED NAUSEA: Primary | ICD-10-CM

## 2024-11-25 DIAGNOSIS — C71.9 ASTROCYTOMA BRAIN TUMOR: ICD-10-CM

## 2024-11-25 DIAGNOSIS — Z79.899 ON ANTINEOPLASTIC CHEMOTHERAPY: ICD-10-CM

## 2024-11-25 LAB
ALBUMIN SERPL-MCNC: 3.9 G/DL (ref 3.5–5)
ALBUMIN/GLOB SERPL: 1.2 RATIO (ref 1.1–2)
ALP SERPL-CCNC: 47 UNIT/L (ref 40–150)
ALT SERPL-CCNC: 144 UNIT/L (ref 0–55)
ANION GAP SERPL CALC-SCNC: 12 MEQ/L
AST SERPL-CCNC: 72 UNIT/L (ref 5–34)
BASOPHILS # BLD AUTO: 0.04 X10(3)/MCL
BASOPHILS NFR BLD AUTO: 0.5 %
BILIRUB SERPL-MCNC: 0.4 MG/DL
BUN SERPL-MCNC: 16 MG/DL (ref 8.9–20.6)
CALCIUM SERPL-MCNC: 10 MG/DL (ref 8.4–10.2)
CHLORIDE SERPL-SCNC: 98 MMOL/L (ref 98–107)
CO2 SERPL-SCNC: 29 MMOL/L (ref 22–29)
CREAT SERPL-MCNC: 1.16 MG/DL (ref 0.72–1.25)
CREAT/UREA NIT SERPL: 14
EOSINOPHIL # BLD AUTO: 0.03 X10(3)/MCL (ref 0–0.9)
EOSINOPHIL NFR BLD AUTO: 0.4 %
ERYTHROCYTE [DISTWIDTH] IN BLOOD BY AUTOMATED COUNT: 13.3 % (ref 11.5–17)
GFR SERPLBLD CREATININE-BSD FMLA CKD-EPI: >60 ML/MIN/1.73/M2
GLOBULIN SER-MCNC: 3.2 GM/DL (ref 2.4–3.5)
GLUCOSE SERPL-MCNC: 178 MG/DL (ref 74–100)
HCT VFR BLD AUTO: 42.8 % (ref 42–52)
HGB BLD-MCNC: 14.6 G/DL (ref 14–18)
IMM GRANULOCYTES # BLD AUTO: 0.04 X10(3)/MCL (ref 0–0.04)
IMM GRANULOCYTES NFR BLD AUTO: 0.5 %
LYMPHOCYTES # BLD AUTO: 1.25 X10(3)/MCL (ref 0.6–4.6)
LYMPHOCYTES NFR BLD AUTO: 15.3 %
MCH RBC QN AUTO: 32.2 PG (ref 27–31)
MCHC RBC AUTO-ENTMCNC: 34.1 G/DL (ref 33–36)
MCV RBC AUTO: 94.5 FL (ref 80–94)
MONOCYTES # BLD AUTO: 0.52 X10(3)/MCL (ref 0.1–1.3)
MONOCYTES NFR BLD AUTO: 6.3 %
NEUTROPHILS # BLD AUTO: 6.31 X10(3)/MCL (ref 2.1–9.2)
NEUTROPHILS NFR BLD AUTO: 77 %
PLATELET # BLD AUTO: 239 X10(3)/MCL (ref 130–400)
PMV BLD AUTO: 9.3 FL (ref 7.4–10.4)
POTASSIUM SERPL-SCNC: 4.3 MMOL/L (ref 3.5–5.1)
PROT SERPL-MCNC: 7.1 GM/DL (ref 6.4–8.3)
RBC # BLD AUTO: 4.53 X10(6)/MCL (ref 4.7–6.1)
SODIUM SERPL-SCNC: 139 MMOL/L (ref 136–145)
WBC # BLD AUTO: 8.19 X10(3)/MCL (ref 4.5–11.5)

## 2024-11-25 PROCEDURE — 99215 OFFICE O/P EST HI 40 MIN: CPT | Mod: S$PBB,,,

## 2024-11-25 PROCEDURE — 3008F BODY MASS INDEX DOCD: CPT | Mod: CPTII,,,

## 2024-11-25 PROCEDURE — 36415 COLL VENOUS BLD VENIPUNCTURE: CPT

## 2024-11-25 PROCEDURE — 3075F SYST BP GE 130 - 139MM HG: CPT | Mod: CPTII,,,

## 2024-11-25 PROCEDURE — 3080F DIAST BP >= 90 MM HG: CPT | Mod: CPTII,,,

## 2024-11-25 PROCEDURE — 85025 COMPLETE CBC W/AUTO DIFF WBC: CPT

## 2024-11-25 PROCEDURE — 99215 OFFICE O/P EST HI 40 MIN: CPT | Mod: PBBFAC

## 2024-11-25 PROCEDURE — 3051F HG A1C>EQUAL 7.0%<8.0%: CPT | Mod: CPTII,,,

## 2024-11-25 PROCEDURE — 4010F ACE/ARB THERAPY RXD/TAKEN: CPT | Mod: CPTII,,,

## 2024-11-25 PROCEDURE — 1160F RVW MEDS BY RX/DR IN RCRD: CPT | Mod: CPTII,,,

## 2024-11-25 PROCEDURE — 99999 PR PBB SHADOW E&M-EST. PATIENT-LVL V: CPT | Mod: PBBFAC,,,

## 2024-11-25 PROCEDURE — 1159F MED LIST DOCD IN RCRD: CPT | Mod: CPTII,,,

## 2024-11-25 PROCEDURE — 80053 COMPREHEN METABOLIC PANEL: CPT

## 2024-11-25 RX ORDER — NEBIVOLOL 5 MG/1
1 TABLET ORAL DAILY
COMMUNITY
Start: 2024-11-13

## 2024-11-25 RX ORDER — SULFAMETHOXAZOLE AND TRIMETHOPRIM 800; 160 MG/1; MG/1
1 TABLET ORAL DAILY
Qty: 42 TABLET | Refills: 0 | Status: SHIPPED | OUTPATIENT
Start: 2024-11-25

## 2024-11-25 RX ORDER — NYSTATIN 100000 [USP'U]/ML
SUSPENSION ORAL
COMMUNITY
Start: 2024-11-18

## 2024-11-25 RX ORDER — EMPAGLIFLOZIN 25 MG/1
1 TABLET, FILM COATED ORAL DAILY
COMMUNITY
Start: 2024-11-13

## 2024-11-25 NOTE — PROGRESS NOTES
Subjective:       Patient ID: Luis Gomes is a 34 y.o. male.    Chief Complaint: Follow Up    Diagnosis: Grade III Astrocytoma    Current Treatment:   Temozolomide 225 mg daily with radiation -started 11/18/2024    Treatment History:   10/15/24 Craniotomy- Dr. Cole Zaldivar      HPI  35yo presented as a hospital consultation in October '24 for Grade III Astrocytoma. He initially presented to hospital with new onset seizure in September '24, workup including CT head revealed concern for CNS mass. He was transferred to [facility] for further evaluation where MRI Brain demonstrated numerous enhancing linear and nodular lesions within the right temporal lobe as well as bilateral enhancing lesions within cerebellar peduncles. He underwent craniotomy for biopsy and partial resection with Dr. Serna on 10/15. 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 IDH1 mutated. Plan to proceed with concurrent radiation with temozolamide followed by 1 year maintenance TMZ alone. He understands the palliative nature of his treatments and prognosis has been discussed.     Interval History:   Patient presents to clinic for toxicity check. He started radiation and temozolomide on 11/18/2024. He was taking a total of 205 mg of temozolomide. Instructed patient that he should be taking 225 mg daily, verbalized understanding. He continues with unbalance, fatigue. He is also reporting body aches, dizziness, nausea that is relieved with zofran, and taste bud changes. He denies peripheral edema, skin rash, constipation, diarrhea, infection, fever or headache. Labs reviewed in detail with patient.       Past Medical History:   Diagnosis Date    Anxiety     Bipolar disorder 03/04/2022    Brain mass     Depression     Fatty liver     High blood sugar     Insomnia     Posttraumatic stress disorder 03/19/2019    Seizures     Sleep apnea       Past Surgical History:   Procedure Laterality Date    CRANIOTOMY,  WITH NEOPLASM EXCISION USING COMPUTER-ASSISTED NAVIGATION Right 10/15/2024    Procedure: CRANIOTOMY, WITH NEOPLASM EXCISION USING COMPUTER-ASSISTED NAVIGATION;  Surgeon: Cole Smith MD;  Location: [facility] OR;  Service: Neurosurgery;  Laterality: Right;  right temporal crani for excision of tumor  Stealth // ULTRASOUND // TIVA SETUP  NTI (meps, sseps, eeg, surbcortical stim - need grid)     Social History     Socioeconomic History    Marital status: Single   Tobacco Use    Smoking status: Every Day     Current packs/day: 0.00     Types: Vaping with nicotine, Cigarettes     Last attempt to quit: 10/2024     Years since quittin.1    Smokeless tobacco: Never   Substance and Sexual Activity    Alcohol use: Yes     Comment: beer occ    Drug use: Yes     Types: Marijuana     Comment: medical marijuana     Social Drivers of Health     Financial Resource Strain: Low Risk  (10/16/2024)    Overall Financial Resource Strain (CARDIA)     Difficulty of Paying Living Expenses: Not very hard   Food Insecurity: No Food Insecurity (10/16/2024)    Hunger Vital Sign     Worried About Running Out of Food in the Last Year: Never true     Ran Out of Food in the Last Year: Never true   Transportation Needs: No Transportation Needs (10/16/2024)    TRANSPORTATION NEEDS     Transportation : No   Physical Activity: Inactive (10/16/2024)    Exercise Vital Sign     Days of Exercise per Week: 0 days     Minutes of Exercise per Session: 0 min   Stress: Stress Concern Present (10/16/2024)    [employer] [place] [employer] - Occupational Stress Questionnaire     Feeling of Stress : To some extent   Housing Stability: Low Risk  (10/16/2024)    Housing Stability Vital Sign     Unable to Pay for Housing in the Last Year: No     Homeless in the Last Year: No      Family History   Problem Relation Name Age of Onset    Mental illness Mother      Mental illness Father      Heart disease Father        Review of patient's allergies  indicates:   Allergen Reactions    Tree nut       Review of Systems   Constitutional:  Positive for fatigue. Negative for appetite change and unexpected weight change.   HENT:  Negative for mouth sores.    Eyes:  Positive for visual disturbance.   Respiratory:  Negative for cough and shortness of breath.    Cardiovascular:  Negative for chest pain.   Gastrointestinal:  Negative for abdominal pain and diarrhea.   Genitourinary:  Positive for frequency.   Musculoskeletal:  Negative for back pain.   Integumentary:  Negative for rash.   Neurological:  Positive for dizziness and coordination difficulties. Negative for headaches.   Hematological:  Negative for adenopathy.   Psychiatric/Behavioral:  The patient is nervous/anxious.          Objective:      Vitals:    11/25/24 1416   Resp: 16         Physical Exam  Constitutional:       General: He is not in acute distress.     Appearance: Normal appearance. He is obese. He is not ill-appearing.   HENT:      Head: Normocephalic and atraumatic.      Nose: Nose normal.      Mouth/Throat:      Mouth: Mucous membranes are moist.      Pharynx: Oropharynx is clear.   Eyes:      Extraocular Movements: Extraocular movements intact.      Conjunctiva/sclera: Conjunctivae normal.      Pupils: Pupils are equal, round, and reactive to light.   Cardiovascular:      Rate and Rhythm: Normal rate and regular rhythm.      Pulses: Normal pulses.      Heart sounds: Normal heart sounds. No murmur heard.  Pulmonary:      Effort: Pulmonary effort is normal. No respiratory distress.      Breath sounds: Normal breath sounds.   Abdominal:      General: There is no distension.      Palpations: Abdomen is soft.      Tenderness: There is no abdominal tenderness.   Musculoskeletal:         General: Normal range of motion.      Cervical back: Normal range of motion and neck supple.      Right lower leg: No edema.      Left lower leg: No edema.   Lymphadenopathy:      Cervical: No cervical adenopathy.    Skin:     General: Skin is warm and dry.   Neurological:      General: No focal deficit present.      Mental Status: He is alert and oriented to person, place, and time.         LABS AND IMAGING REVIEWED IN EPIC  09/27/24 MRI Brain   1.  Right temporal lobe numerous enhancing linear and nodular lesions which extend medially along midbrain margin and there are additional enhancing lesions bilateral cerebellar peduncles.  Also heterogeneity and some enhancement of the right aspect of midbrain.  Surrounding substantial brain edema, mass effect and midline shift.  Findings are concerning to be neoplasm and could be metastatic.  2.  No acute brain infarct.    11/15/24 MRI Brain  Large infiltrative tumor centered in the right temporal lobe, invading the basal cisterns and 4th ventricle as above.     While the tumor is overall stable in size, there is an area of increasing nodular enhancement in the region of the right thalamus and internal capsule    Pathology:  10/15/24 Biopsy  1. RIGHT TEMPORAL TUMOR:   ANAPLASTIC GEMISTOCYTIC ASTROCYTOMA, WHO GRADE III.   2. RIGHT TEMPORAL TUMOR PERMANENT:   ANAPLASTIC GEMISTOCYTIC ASTROCYTOMA, WHO GRADE   The following CLINICALLY RELEVANT VARIANT was detected:   Gene:  IDH1   DNA Change:  c.395G>A (Exon 4)   Amino Acid Change:  p.R132H (Arg)132His)   Variant Allele Frequency:  39.9%   NO reportable CLINICALLY RELEVANT SEQUENCE VARIANTS detected within the analyzed   regiohns of TERT gene.      Assessment:   Grade III Astrocytoma - IDH1 mutation positive. Discussed first line recommendation of concurrent RT + TMZ followed by maintenance TMZ x 1 year.     Plan for TMZ 225mg Daily for 42 days straight (including weekends without XRT) with concurrent XRT.  Then repeat MRI brain 4 weeks after completing treatment prior to starting maintenance TMZ.     Plan:       - MRI Brain completed on 11/15/24 with radiation oncology  - Rx Bactrim DS once daily for PJP prophylaxis while on temodar  sent to pharmacy  - Defer steroid taper to radiation oncology  - Continue temodar 225 mg daily -take 1 -180 mg,  2-20 mg, and 1-5 mg to equal total of 225 mg  - RTC 3 weeks for NP with labs same day   -CBC CMP    I spent a total of 40 minutes on the day of the visit.This includes face to face time and non-face to face time preparing to see the patient (eg, review of tests), obtaining and/or reviewing separately obtained history, documenting clinical information in the electronic or other health record, independently interpreting results and communicating results to the patient/family/caregiver, or care coordinator.    Abi Benitez, KARENP-C  Oncology/Hematology  [facility]

## 2024-12-02 ENCOUNTER — OFFICE VISIT (OUTPATIENT)
Dept: NEUROLOGY | Facility: CLINIC | Age: 34
End: 2024-12-02
Payer: MEDICAID

## 2024-12-02 DIAGNOSIS — G93.89 BRAIN MASS: ICD-10-CM

## 2024-12-02 DIAGNOSIS — G40.019 LOCALIZATION-RELATED (FOCAL) (PARTIAL) IDIOPATHIC EPILEPSY AND EPILEPTIC SYNDROMES WITH SEIZURES OF LOCALIZED ONSET, INTRACTABLE, WITHOUT STATUS EPILEPTICUS: Primary | ICD-10-CM

## 2024-12-02 PROCEDURE — 3051F HG A1C>EQUAL 7.0%<8.0%: CPT | Mod: CPTII,95,,

## 2024-12-02 PROCEDURE — 1159F MED LIST DOCD IN RCRD: CPT | Mod: CPTII,95,,

## 2024-12-02 PROCEDURE — 1160F RVW MEDS BY RX/DR IN RCRD: CPT | Mod: CPTII,95,,

## 2024-12-02 PROCEDURE — 99213 OFFICE O/P EST LOW 20 MIN: CPT | Mod: 95,,,

## 2024-12-02 PROCEDURE — 4010F ACE/ARB THERAPY RXD/TAKEN: CPT | Mod: CPTII,95,,

## 2024-12-02 RX ORDER — DULOXETIN HYDROCHLORIDE 60 MG/1
60 CAPSULE, DELAYED RELEASE ORAL DAILY
COMMUNITY
Start: 2024-11-27

## 2024-12-02 RX ORDER — LEVETIRACETAM 750 MG/1
1500 TABLET ORAL 2 TIMES DAILY
Qty: 120 TABLET | Refills: 5 | Status: SHIPPED | OUTPATIENT
Start: 2024-12-02

## 2024-12-02 RX ORDER — DEXAMETHASONE 2 MG/1
3 TABLET ORAL EVERY MORNING
COMMUNITY

## 2024-12-02 NOTE — PROGRESS NOTES
Neurology Clinic - Virtual Visit      Patient ID: [id number]     Subjective:     Chief Complaint: seizure follow-up    HPI:  Luis Gomes is a 34 y.o. male here today for a telemedicine visit.     Pt here for seizure follow-up.   Denies any seizures since last visit. Last known seizure 10/15/24.   Continues taking Keppra 1500mg BID. Tolerating well without side effects.   Sleeping well. Not driving, license is currently suspended.   Reports he's nursing home through radiation treatments and tolerating it well so far.   No complaints today.    This is a telemedicine note. Patient was treated using telemedicine, real time audio and video, according to [facility] protocols. IAllie FNP-C, conducted the visit from the [facility]. The patient participated in the visit at a non-[facility] location selected by the patient (or patient's representative), identified below. I am licensed in the state where the patient stated they are located. The patient (or patient's representative) stated that they understood and accepted the privacy and security risks to their information at their location. This visit is not recorded.    The patient's location is: vehicle as passenger  Visit type: audiovisual     Past Medical History:   Diagnosis Date    Anxiety     Bipolar disorder 03/04/2022    Brain mass     Depression     Fatty liver     High blood sugar     Insomnia     Posttraumatic stress disorder 03/19/2019    Seizures     Sleep apnea         Past Surgical History:   Procedure Laterality Date    CRANIOTOMY, WITH NEOPLASM EXCISION USING COMPUTER-ASSISTED NAVIGATION Right 10/15/2024    Procedure: CRANIOTOMY, WITH NEOPLASM EXCISION USING COMPUTER-ASSISTED NAVIGATION;  Surgeon: Cole Smith MD;  Location: [facility] OR;  Service: Neurosurgery;  Laterality: Right;  right temporal crani for excision of tumor  Stealth // ULTRASOUND // TIVA SETUP  NTI (meps, sseps, eeg, surbcortical stim - need grid)        Social  History     Tobacco Use    Smoking status: Every Day     Current packs/day: 0.00     Types: Vaping with nicotine, Cigarettes     Last attempt to quit: 10/2024     Years since quittin.1    Smokeless tobacco: Never   Substance and Sexual Activity    Alcohol use: Yes     Comment: beer occ    Drug use: Yes     Types: Marijuana     Comment: medical marijuana    Sexual activity: Not on file        Current Outpatient Medications   Medication Instructions    albuterol (PROVENTIL/VENTOLIN HFA) 90 mcg/actuation inhaler inhale TWO puffs EVERY 4 TO 6 HOURS AS NEEDED SHORTNESS OF BREATH wheezing    busPIRone (BUSPAR) 15 MG tablet 1 tablet, 3 times daily    dexAMETHasone (DECADRON) 2 MG tablet 3 tablets, Oral, Every morning    DULoxetine (CYMBALTA) 30 MG capsule TAKE ONE CAPSULE BY MOUTH EVERY DAY with 60mg    DULoxetine (CYMBALTA) 60 mg, Daily    guanFACINE (INTUNIV ER) 2 mg Tb24 1 tablet, Daily    HYDROcodone-acetaminophen (NORCO) 5-325 mg per tablet 1 tablet, Every 4 hours PRN    hydrOXYzine pamoate (VISTARIL) 50 mg, Every 8 hours PRN    ibuprofen (ADVIL,MOTRIN) 800 mg, 3 times daily    JARDIANCE 25 mg tablet 1 tablet, Daily    levETIRAcetam (KEPPRA) 1,500 mg, Oral, 2 times daily    lisdexamfetamine (VYVANSE) 50 MG capsule TAKE ONE CAPSULE BY MOUTH ONCE EVERY DAY    lisinopriL-hydrochlorothiazide (PRINZIDE,ZESTORETIC) 20-12.5 mg per tablet 1 tablet, Daily    lurasidone (LATUDA) 80 mg, Daily    metFORMIN (GLUCOPHAGE) 850 mg, Oral, 2 times daily with meals    nebivoloL (BYSTOLIC) 5 MG Tab 1 tablet, Daily    nystatin (MYCOSTATIN) 100,000 unit/mL suspension Take 5 mL 4 times a day by oral route, for thrush.    ondansetron (ZOFRAN-ODT) 4 mg, Every 6 hours PRN    QUEtiapine (SEROQUEL) 100 mg, Nightly    sulfamethoxazole-trimethoprim 800-160mg (BACTRIM DS) 800-160 mg Tab 1 tablet, 2 times daily    temozolomide (TEMODAR) 75 mg/m2, Oral, Daily, Take as directed days 1-42 (6 weeks). Take on an empty stomach.    TRUE METRIX GLUCOSE  METER Misc USE TO check glucose BEFORE meals AND AT BEDTIME    TRUE METRIX GLUCOSE TEST STRIP Strp 4 times daily    TRUEPLUS LANCETS 30 gauge Misc USE TO check glucose BEFORE meals AND AT BEDTIME    XANAX 0.5 mg tablet Take 1 tablet 3 times a day by oral route as needed, for Anxiety.       Review of patient's allergies indicates:   Allergen Reactions    Tree nut         Review of Systems    12 point review of systems conducted, negative except as stated in the history of present illness. See HPI for details.    Objective:     There were no vitals taken for this visit.    Physical Exam      Physical Exam: LIMITED DUE TO TELEMEDICINE RESTRICTIONS.  General: Alert and oriented, No acute distress.  Speech: clear/ fluent  Head: Normocephalic.  Eyes: Sclera non-icteric. EOM intact.  Respiratory: Non-labored respirations, pulmonary effort normal  Musculoskeletal: range of motion grossly normal  Integumentary:  No visible suspicious lesions or rashes. No jaundice or diaphoresis.   Neurologic: No focal deficits  Psychiatric: Normal interaction, Coherent speech, Euthymic mood, Appropriate affect     Assessment:       ICD-10-CM ICD-9-CM   1. 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 Brain mass  G93.89 348.89        Plan:     1. 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  Dispense: 120 tablet; Refill: 5  - importance of healthy diet, regular exercise and sleep hygiene discussed  - Risks of recurrent seizure discussed. Seizure precautions discussed. Pt aware that unlawful to drive in [place] until seizure free at least 6 months. He is not currently driving.  -Seizure medications can be  associated with certain side effects, including memory dysfunction or mood disorders.   -Excessive daytime sleepiness may occur sometimes leading to car crashes.  -Abrupt stoppage of anticonvulsant medications can be medically troublesome    Do not swim in pool alone  Avoid medications such as Tramadol, Wellbutrin, Cipro, Levaquin  Avoid ladders/heights  Avoid binge drinking  Avoid sleep deprivation       2. Brain mass  S/p craniotomy. Currently undergoing chemotherapy (temozolomide) and XRT managed by heme/onc.          Follow-up mid-April, he prefers virtual visit. In addition to their scheduled follow up, the patient has also been instructed to follow up on as needed basis.     Future Appointments   Date Time Provider Department Center   12/19/2024 10:00 AM Fawn Power FNP [facility]   1/15/2025  9:15 AM [facility] MRI1 450 LB LIMIT [facility] MRI [facility]   1/22/2025 10:30 AM Azul Del Toro, LAURIE [facility] RAMESH [place]        Video Time Documentation:  Spent 10 minutes with patient face to face discussed health concerns. More than 50% of this time was spent in counseling and coordination of care.    Allie Ruelas, MSN, APRN, FNP-C  [facility]  [phone]

## 2024-12-06 ENCOUNTER — DOCUMENT SCAN (OUTPATIENT)
Dept: HOME HEALTH SERVICES | Facility: HOSPITAL | Age: 34
End: 2024-12-06
Payer: MEDICAID

## 2024-12-09 ENCOUNTER — DOCUMENT SCAN (OUTPATIENT)
Dept: HOME HEALTH SERVICES | Facility: HOSPITAL | Age: 34
End: 2024-12-09
Payer: MEDICAID

## 2024-12-18 NOTE — PROGRESS NOTES
Subjective:       Patient ID: Luis Gomes is a 34 y.o. male.    Chief Complaint: Astrocytoma brain tumor (Pt continues with feeling unbalanced. He had about 3 falls since last OV d/t this. He reports nausea controlled with Zofran, improved dizziness, and denies any body aches. )    Diagnosis: Grade III Astrocytoma    Current Treatment:   Temozolomide 225 mg daily with radiation -started 11/18/2024    Treatment History:   10/15/24 Craniotomy- Dr. Cole Zaldivar      HPI  33yo presented as a hospital consultation in October '24 for Grade III Astrocytoma. He initially presented to hospital with new onset seizure in September '24, workup including CT head revealed concern for CNS mass. He was transferred to [facility] for further evaluation where MRI Brain demonstrated numerous enhancing linear and nodular lesions within the right temporal lobe as well as bilateral enhancing lesions within cerebellar peduncles. He underwent craniotomy for biopsy and partial resection with Dr. Serna on 10/15. 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 IDH1 mutated. Plan to proceed with concurrent radiation with temozolamide followed by 1 year maintenance TMZ alone. He understands the palliative nature of his treatments and prognosis has been discussed.     Interval History:   Patient presents to clinic for toxicity check. He started radiation and temozolomide on 11/18/2024. He reports being compliant with his temodar 225 mg daily. He continues with imbalance, fatigue. Nausea is controlled with zofran. He does have dizziness and blurred vision that is unchanged. He did have a fall at home as he does not use his cane at home. His PCP is managing his blood sugar. He denies peripheral edema, skin rash, constipation, diarrhea, infection, fever or headache. Labs reviewed in detail with patient.       Past Medical History:   Diagnosis Date    Anxiety     Bipolar disorder 03/04/2022    Brain mass      Depression     Fatty liver     High blood sugar     Insomnia     Posttraumatic stress disorder 2019    Seizures     Sleep apnea       Past Surgical History:   Procedure Laterality Date    CRANIOTOMY, WITH NEOPLASM EXCISION USING COMPUTER-ASSISTED NAVIGATION Right 10/15/2024    Procedure: CRANIOTOMY, WITH NEOPLASM EXCISION USING COMPUTER-ASSISTED NAVIGATION;  Surgeon: Cole Smith MD;  Location: [facility] OR;  Service: Neurosurgery;  Laterality: Right;  right temporal crani for excision of tumor  Stealth // ULTRASOUND // TIVA SETUP  NTI (meps, sseps, eeg, surbcortical stim - need grid)     Social History     Socioeconomic History    Marital status: Single   Tobacco Use    Smoking status: Every Day     Current packs/day: 0.00     Types: Vaping with nicotine, Cigarettes     Last attempt to quit: 10/2024     Years since quittin.2    Smokeless tobacco: Never   Substance and Sexual Activity    Alcohol use: Yes     Comment: beer occ    Drug use: Yes     Types: Marijuana     Comment: medical marijuana     Social Drivers of Health     Financial Resource Strain: Low Risk  (10/16/2024)    Overall Financial Resource Strain (CARDIA)     Difficulty of Paying Living Expenses: Not very hard   Food Insecurity: No Food Insecurity (10/16/2024)    Hunger Vital Sign     Worried About Running Out of Food in the Last Year: Never true     Ran Out of Food in the Last Year: Never true   Transportation Needs: No Transportation Needs (10/16/2024)    TRANSPORTATION NEEDS     Transportation : No   Physical Activity: Inactive (10/16/2024)    Exercise Vital Sign     Days of Exercise per Week: 0 days     Minutes of Exercise per Session: 0 min   Stress: Stress Concern Present (10/16/2024)    [employer] [place] [employer] - Occupational Stress Questionnaire     Feeling of Stress : To some extent   Housing Stability: Low Risk  (10/16/2024)    Housing Stability Vital Sign     Unable to Pay for Housing in the Last Year: No      Homeless in the Last Year: No      Family History   Problem Relation Name Age of Onset    Mental illness Mother      Mental illness Father      Heart disease Father        Review of patient's allergies indicates:   Allergen Reactions    Tree nut       Review of Systems   Constitutional:  Positive for fatigue. Negative for appetite change and unexpected weight change.   HENT:  Negative for mouth sores.    Eyes:  Positive for visual disturbance.   Respiratory:  Negative for cough and shortness of breath.    Cardiovascular:  Negative for chest pain.   Gastrointestinal:  Negative for abdominal pain and diarrhea.   Genitourinary:  Positive for frequency.   Musculoskeletal:  Negative for back pain.   Integumentary:  Negative for rash.   Neurological:  Positive for dizziness and coordination difficulties. Negative for headaches.   Hematological:  Negative for adenopathy.   Psychiatric/Behavioral:  The patient is nervous/anxious.          Objective:      Vitals:    12/19/24 1012   BP: 107/73   Pulse: 88   Resp: 16   Temp: 98.8 °F (37.1 °C)           Physical Exam  Constitutional:       General: He is not in acute distress.     Appearance: Normal appearance. He is obese. He is not ill-appearing.   HENT:      Head: Normocephalic and atraumatic.      Nose: Nose normal.      Mouth/Throat:      Mouth: Mucous membranes are moist.      Pharynx: Oropharynx is clear.   Eyes:      Extraocular Movements: Extraocular movements intact.      Conjunctiva/sclera: Conjunctivae normal.      Pupils: Pupils are equal, round, and reactive to light.   Cardiovascular:      Rate and Rhythm: Normal rate and regular rhythm.      Pulses: Normal pulses.      Heart sounds: Normal heart sounds. No murmur heard.  Pulmonary:      Effort: Pulmonary effort is normal. No respiratory distress.      Breath sounds: Normal breath sounds.   Abdominal:      General: There is no distension.      Palpations: Abdomen is soft.      Tenderness: There is no abdominal  tenderness.   Musculoskeletal:         General: Normal range of motion.      Cervical back: Normal range of motion and neck supple.      Right lower leg: No edema.      Left lower leg: No edema.   Lymphadenopathy:      Cervical: No cervical adenopathy.   Skin:     General: Skin is warm and dry.   Neurological:      General: No focal deficit present.      Mental Status: He is alert and oriented to person, place, and time.         LABS AND IMAGING REVIEWED IN EPIC  09/27/24 MRI Brain   1.  Right temporal lobe numerous enhancing linear and nodular lesions which extend medially along midbrain margin and there are additional enhancing lesions bilateral cerebellar peduncles.  Also heterogeneity and some enhancement of the right aspect of midbrain.  Surrounding substantial brain edema, mass effect and midline shift.  Findings are concerning to be neoplasm and could be metastatic.  2.  No acute brain infarct.    11/15/24 MRI Brain  Large infiltrative tumor centered in the right temporal lobe, invading the basal cisterns and 4th ventricle as above.     While the tumor is overall stable in size, there is an area of increasing nodular enhancement in the region of the right thalamus and internal capsule    Pathology:  10/15/24 Biopsy  1. RIGHT TEMPORAL TUMOR:   ANAPLASTIC GEMISTOCYTIC ASTROCYTOMA, WHO GRADE III.   2. RIGHT TEMPORAL TUMOR PERMANENT:   ANAPLASTIC GEMISTOCYTIC ASTROCYTOMA, WHO GRADE   The following CLINICALLY RELEVANT VARIANT was detected:   Gene:  IDH1   DNA Change:  c.395G>A (Exon 4)   Amino Acid Change:  p.R132H (Arg)132His)   Variant Allele Frequency:  39.9%   NO reportable CLINICALLY RELEVANT SEQUENCE VARIANTS detected within the analyzed   regiohns of TERT gene.      Assessment:   Grade III Astrocytoma - IDH1 mutation positive. Discussed first line recommendation of concurrent RT + TMZ followed by maintenance TMZ x 1 year.   Plan for TMZ 225mg Daily for 42 days straight (including weekends without XRT) with  concurrent XRT.  Then repeat MRI brain 4 weeks after completing treatment prior to starting maintenance TMZ.   Repeat MRI scheduled for 1/15/2025    Immunodeficiency due to Drug Therapy   Precautions discussed with patient. Continue to monitor for any signs of symptoms of infection. No prophylaxis needed at this time. No role for growth factor.   Blurred vision   Present prior to treatment, stable and unchanged   Encouraged follow-up with ophthalmology.     Plan:   Labs and exam stable.  Continues with Temodar 225 mg daily + XRT  He will follow-up with PCP for hyperglycemia   Repeat MRI scheduled for 1/15/2025  Continue Bactrim DS once daily for PJP prophylaxis while on temodar   Steroid taper per rad/onc, currently on 1mg  He does have HH twice weekly  RTC 2 weeks for NP with labs same day   -CBC CMP    I spent a total of 40 minutes on the day of the visit.This includes face to face time and non-face to face time preparing to see the patient (eg, review of tests), obtaining and/or reviewing separately obtained history, documenting clinical information in the electronic or other health record, independently interpreting results and communicating results to the patient/family/caregiver, or care coordinator.    Fawn Power, FNP-C  Oncology/Hematology   [facility] Center [facility]

## 2024-12-19 ENCOUNTER — LAB VISIT (OUTPATIENT)
Dept: LAB | Facility: HOSPITAL | Age: 34
End: 2024-12-19
Payer: MEDICAID

## 2024-12-19 ENCOUNTER — OFFICE VISIT (OUTPATIENT)
Dept: HEMATOLOGY/ONCOLOGY | Facility: CLINIC | Age: 34
End: 2024-12-19
Payer: MEDICAID

## 2024-12-19 VITALS
OXYGEN SATURATION: 95 % | HEIGHT: 70 IN | HEART RATE: 88 BPM | WEIGHT: 315 LBS | RESPIRATION RATE: 16 BRPM | DIASTOLIC BLOOD PRESSURE: 73 MMHG | BODY MASS INDEX: 45.1 KG/M2 | TEMPERATURE: 99 F | SYSTOLIC BLOOD PRESSURE: 107 MMHG

## 2024-12-19 DIAGNOSIS — D84.821 IMMUNODEFICIENCY DUE TO CHEMOTHERAPY: ICD-10-CM

## 2024-12-19 DIAGNOSIS — C71.9 ASTROCYTOMA BRAIN TUMOR: Primary | ICD-10-CM

## 2024-12-19 DIAGNOSIS — C71.9 CANCER OF BRAIN TREATED WITH RADIATION THERAPY: ICD-10-CM

## 2024-12-19 DIAGNOSIS — Z79.899 IMMUNODEFICIENCY DUE TO CHEMOTHERAPY: ICD-10-CM

## 2024-12-19 DIAGNOSIS — C71.9 ASTROCYTOMA BRAIN TUMOR: ICD-10-CM

## 2024-12-19 DIAGNOSIS — Z79.899 ON ANTINEOPLASTIC CHEMOTHERAPY: ICD-10-CM

## 2024-12-19 DIAGNOSIS — E66.01 MORBID OBESITY: ICD-10-CM

## 2024-12-19 DIAGNOSIS — T45.1X5A IMMUNODEFICIENCY DUE TO CHEMOTHERAPY: ICD-10-CM

## 2024-12-19 DIAGNOSIS — T45.1X5A CHEMOTHERAPY-INDUCED NAUSEA: ICD-10-CM

## 2024-12-19 DIAGNOSIS — R11.0 CHEMOTHERAPY-INDUCED NAUSEA: ICD-10-CM

## 2024-12-19 LAB
ALBUMIN SERPL-MCNC: 3.6 G/DL (ref 3.5–5)
ALBUMIN/GLOB SERPL: 1.2 RATIO (ref 1.1–2)
ALP SERPL-CCNC: 42 UNIT/L (ref 40–150)
ALT SERPL-CCNC: 109 UNIT/L (ref 0–55)
ANION GAP SERPL CALC-SCNC: 10 MEQ/L
AST SERPL-CCNC: 33 UNIT/L (ref 5–34)
BASOPHILS # BLD AUTO: 0.03 X10(3)/MCL
BASOPHILS NFR BLD AUTO: 0.3 %
BILIRUB SERPL-MCNC: 0.4 MG/DL
BUN SERPL-MCNC: 20 MG/DL (ref 8.9–20.6)
CALCIUM SERPL-MCNC: 9.7 MG/DL (ref 8.4–10.2)
CHLORIDE SERPL-SCNC: 100 MMOL/L (ref 98–107)
CO2 SERPL-SCNC: 26 MMOL/L (ref 22–29)
CREAT SERPL-MCNC: 0.89 MG/DL (ref 0.72–1.25)
CREAT/UREA NIT SERPL: 22
EOSINOPHIL # BLD AUTO: 0.09 X10(3)/MCL (ref 0–0.9)
EOSINOPHIL NFR BLD AUTO: 0.9 %
ERYTHROCYTE [DISTWIDTH] IN BLOOD BY AUTOMATED COUNT: 13.7 % (ref 11.5–17)
GFR SERPLBLD CREATININE-BSD FMLA CKD-EPI: >60 ML/MIN/1.73/M2
GLOBULIN SER-MCNC: 3.1 GM/DL (ref 2.4–3.5)
GLUCOSE SERPL-MCNC: 209 MG/DL (ref 74–100)
HCT VFR BLD AUTO: 42 % (ref 42–52)
HGB BLD-MCNC: 14.4 G/DL (ref 14–18)
IMM GRANULOCYTES # BLD AUTO: 0.12 X10(3)/MCL (ref 0–0.04)
IMM GRANULOCYTES NFR BLD AUTO: 1.2 %
LYMPHOCYTES # BLD AUTO: 0.87 X10(3)/MCL (ref 0.6–4.6)
LYMPHOCYTES NFR BLD AUTO: 8.3 %
MCH RBC QN AUTO: 33.3 PG (ref 27–31)
MCHC RBC AUTO-ENTMCNC: 34.3 G/DL (ref 33–36)
MCV RBC AUTO: 97.2 FL (ref 80–94)
MONOCYTES # BLD AUTO: 0.63 X10(3)/MCL (ref 0.1–1.3)
MONOCYTES NFR BLD AUTO: 6 %
NEUTROPHILS # BLD AUTO: 8.69 X10(3)/MCL (ref 2.1–9.2)
NEUTROPHILS NFR BLD AUTO: 83.3 %
PLATELET # BLD AUTO: 191 X10(3)/MCL (ref 130–400)
PMV BLD AUTO: 9.1 FL (ref 7.4–10.4)
POTASSIUM SERPL-SCNC: 4.4 MMOL/L (ref 3.5–5.1)
PROT SERPL-MCNC: 6.7 GM/DL (ref 6.4–8.3)
RBC # BLD AUTO: 4.32 X10(6)/MCL (ref 4.7–6.1)
SODIUM SERPL-SCNC: 136 MMOL/L (ref 136–145)
WBC # BLD AUTO: 10.43 X10(3)/MCL (ref 4.5–11.5)

## 2024-12-19 PROCEDURE — 85025 COMPLETE CBC W/AUTO DIFF WBC: CPT

## 2024-12-19 PROCEDURE — 3051F HG A1C>EQUAL 7.0%<8.0%: CPT | Mod: CPTII,,,

## 2024-12-19 PROCEDURE — 36415 COLL VENOUS BLD VENIPUNCTURE: CPT

## 2024-12-19 PROCEDURE — 3074F SYST BP LT 130 MM HG: CPT | Mod: CPTII,,,

## 2024-12-19 PROCEDURE — 99215 OFFICE O/P EST HI 40 MIN: CPT | Mod: S$PBB,,,

## 2024-12-19 PROCEDURE — 4010F ACE/ARB THERAPY RXD/TAKEN: CPT | Mod: CPTII,,,

## 2024-12-19 PROCEDURE — 1160F RVW MEDS BY RX/DR IN RCRD: CPT | Mod: CPTII,,,

## 2024-12-19 PROCEDURE — 3008F BODY MASS INDEX DOCD: CPT | Mod: CPTII,,,

## 2024-12-19 PROCEDURE — 99213 OFFICE O/P EST LOW 20 MIN: CPT | Mod: PBBFAC

## 2024-12-19 PROCEDURE — 80053 COMPREHEN METABOLIC PANEL: CPT

## 2024-12-19 PROCEDURE — 99999 PR PBB SHADOW E&M-EST. PATIENT-LVL III: CPT | Mod: PBBFAC,,,

## 2024-12-19 PROCEDURE — 1159F MED LIST DOCD IN RCRD: CPT | Mod: CPTII,,,

## 2024-12-19 PROCEDURE — 3078F DIAST BP <80 MM HG: CPT | Mod: CPTII,,,

## 2024-12-19 RX ORDER — MIRABEGRON 25 MG/1
TABLET, FILM COATED, EXTENDED RELEASE ORAL
COMMUNITY
Start: 2024-12-17

## 2024-12-19 RX ORDER — LINACLOTIDE 72 UG/1
CAPSULE, GELATIN COATED ORAL
COMMUNITY
Start: 2024-12-10

## 2025-01-02 ENCOUNTER — LAB VISIT (OUTPATIENT)
Dept: LAB | Facility: HOSPITAL | Age: 35
End: 2025-01-02
Payer: MEDICAID

## 2025-01-02 ENCOUNTER — OFFICE VISIT (OUTPATIENT)
Dept: HEMATOLOGY/ONCOLOGY | Facility: CLINIC | Age: 35
End: 2025-01-02
Payer: MEDICAID

## 2025-01-02 VITALS
HEART RATE: 122 BPM | RESPIRATION RATE: 12 BRPM | TEMPERATURE: 98 F | DIASTOLIC BLOOD PRESSURE: 79 MMHG | BODY MASS INDEX: 45.1 KG/M2 | OXYGEN SATURATION: 96 % | WEIGHT: 315 LBS | SYSTOLIC BLOOD PRESSURE: 108 MMHG | HEIGHT: 70 IN

## 2025-01-02 DIAGNOSIS — T45.1X5A IMMUNODEFICIENCY DUE TO CHEMOTHERAPY: ICD-10-CM

## 2025-01-02 DIAGNOSIS — T45.1X5A CHEMOTHERAPY-INDUCED NAUSEA: ICD-10-CM

## 2025-01-02 DIAGNOSIS — D84.821 IMMUNODEFICIENCY DUE TO CHEMOTHERAPY: ICD-10-CM

## 2025-01-02 DIAGNOSIS — Z79.899 ON ANTINEOPLASTIC CHEMOTHERAPY: ICD-10-CM

## 2025-01-02 DIAGNOSIS — K13.79 MOUTH SORES: ICD-10-CM

## 2025-01-02 DIAGNOSIS — E66.01 MORBID OBESITY: ICD-10-CM

## 2025-01-02 DIAGNOSIS — Z79.899 ON ANTINEOPLASTIC CHEMOTHERAPY: Primary | ICD-10-CM

## 2025-01-02 DIAGNOSIS — R11.0 CHEMOTHERAPY-INDUCED NAUSEA: ICD-10-CM

## 2025-01-02 DIAGNOSIS — C71.9 ASTROCYTOMA BRAIN TUMOR: ICD-10-CM

## 2025-01-02 DIAGNOSIS — R11.2 CHEMOTHERAPY INDUCED NAUSEA AND VOMITING: ICD-10-CM

## 2025-01-02 DIAGNOSIS — T45.1X5A CHEMOTHERAPY INDUCED NAUSEA AND VOMITING: ICD-10-CM

## 2025-01-02 DIAGNOSIS — C71.9 ASTROCYTOMA BRAIN TUMOR: Primary | ICD-10-CM

## 2025-01-02 DIAGNOSIS — C71.9 CANCER OF BRAIN TREATED WITH RADIATION THERAPY: ICD-10-CM

## 2025-01-02 DIAGNOSIS — F41.9 ANXIETY: ICD-10-CM

## 2025-01-02 DIAGNOSIS — Z79.899 IMMUNODEFICIENCY DUE TO CHEMOTHERAPY: ICD-10-CM

## 2025-01-02 LAB
ALBUMIN SERPL-MCNC: 4 G/DL (ref 3.5–5)
ALBUMIN/GLOB SERPL: 1.1 RATIO (ref 1.1–2)
ALP SERPL-CCNC: 42 UNIT/L (ref 40–150)
ALT SERPL-CCNC: 171 UNIT/L (ref 0–55)
ANION GAP SERPL CALC-SCNC: 11 MEQ/L
AST SERPL-CCNC: 53 UNIT/L (ref 5–34)
BASOPHILS # BLD AUTO: 0 X10(3)/MCL
BASOPHILS NFR BLD AUTO: 0 %
BILIRUB SERPL-MCNC: 0.8 MG/DL
BUN SERPL-MCNC: 22 MG/DL (ref 8.9–20.6)
CALCIUM SERPL-MCNC: 9.9 MG/DL (ref 8.4–10.2)
CHLORIDE SERPL-SCNC: 96 MMOL/L (ref 98–107)
CO2 SERPL-SCNC: 30 MMOL/L (ref 22–29)
CREAT SERPL-MCNC: 0.95 MG/DL (ref 0.72–1.25)
CREAT/UREA NIT SERPL: 23
EOSINOPHIL # BLD AUTO: 0 X10(3)/MCL (ref 0–0.9)
EOSINOPHIL NFR BLD AUTO: 0 %
ERYTHROCYTE [DISTWIDTH] IN BLOOD BY AUTOMATED COUNT: 15.2 % (ref 11.5–17)
GFR SERPLBLD CREATININE-BSD FMLA CKD-EPI: >60 ML/MIN/1.73/M2
GLOBULIN SER-MCNC: 3.6 GM/DL (ref 2.4–3.5)
GLUCOSE SERPL-MCNC: 160 MG/DL (ref 74–100)
HCT VFR BLD AUTO: 44.5 % (ref 42–52)
HGB BLD-MCNC: 15.6 G/DL (ref 14–18)
IMM GRANULOCYTES # BLD AUTO: 0.08 X10(3)/MCL (ref 0–0.04)
IMM GRANULOCYTES NFR BLD AUTO: 1 %
LYMPHOCYTES # BLD AUTO: 0.37 X10(3)/MCL (ref 0.6–4.6)
LYMPHOCYTES NFR BLD AUTO: 4.4 %
MAGNESIUM SERPL-MCNC: 2.4 MG/DL (ref 1.6–2.6)
MCH RBC QN AUTO: 34.4 PG (ref 27–31)
MCHC RBC AUTO-ENTMCNC: 35.1 G/DL (ref 33–36)
MCV RBC AUTO: 98 FL (ref 80–94)
MONOCYTES # BLD AUTO: 0.37 X10(3)/MCL (ref 0.1–1.3)
MONOCYTES NFR BLD AUTO: 4.4 %
NEUTROPHILS # BLD AUTO: 7.53 X10(3)/MCL (ref 2.1–9.2)
NEUTROPHILS NFR BLD AUTO: 90.2 %
PLATELET # BLD AUTO: 83 X10(3)/MCL (ref 130–400)
PMV BLD AUTO: 8.9 FL (ref 7.4–10.4)
POTASSIUM SERPL-SCNC: 4.5 MMOL/L (ref 3.5–5.1)
PROT SERPL-MCNC: 7.6 GM/DL (ref 6.4–8.3)
RBC # BLD AUTO: 4.54 X10(6)/MCL (ref 4.7–6.1)
SODIUM SERPL-SCNC: 137 MMOL/L (ref 136–145)
WBC # BLD AUTO: 8.35 X10(3)/MCL (ref 4.5–11.5)

## 2025-01-02 PROCEDURE — 99999 PR PBB SHADOW E&M-EST. PATIENT-LVL V: CPT | Mod: PBBFAC,,,

## 2025-01-02 PROCEDURE — 85025 COMPLETE CBC W/AUTO DIFF WBC: CPT

## 2025-01-02 PROCEDURE — 83735 ASSAY OF MAGNESIUM: CPT

## 2025-01-02 PROCEDURE — 1160F RVW MEDS BY RX/DR IN RCRD: CPT | Mod: CPTII,,,

## 2025-01-02 PROCEDURE — 36415 COLL VENOUS BLD VENIPUNCTURE: CPT

## 2025-01-02 PROCEDURE — 3008F BODY MASS INDEX DOCD: CPT | Mod: CPTII,,,

## 2025-01-02 PROCEDURE — 80053 COMPREHEN METABOLIC PANEL: CPT

## 2025-01-02 PROCEDURE — 99215 OFFICE O/P EST HI 40 MIN: CPT | Mod: S$PBB,,,

## 2025-01-02 PROCEDURE — 1159F MED LIST DOCD IN RCRD: CPT | Mod: CPTII,,,

## 2025-01-02 PROCEDURE — 3078F DIAST BP <80 MM HG: CPT | Mod: CPTII,,,

## 2025-01-02 PROCEDURE — 3074F SYST BP LT 130 MM HG: CPT | Mod: CPTII,,,

## 2025-01-02 PROCEDURE — 99215 OFFICE O/P EST HI 40 MIN: CPT | Mod: PBBFAC

## 2025-01-02 RX ORDER — TEMOZOLOMIDE 5 MG/1
5 CAPSULE ORAL DAILY
Qty: 4 CAPSULE | Refills: 0 | Status: ACTIVE | OUTPATIENT
Start: 2025-01-03 | End: 2025-01-07

## 2025-01-02 RX ORDER — DIPHENHYDRAMINE HCL 12.5MG/5ML
12.5 ELIXIR ORAL 4 TIMES DAILY PRN
Qty: 118 ML | Refills: 3 | Status: SHIPPED | OUTPATIENT
Start: 2025-01-02

## 2025-01-02 RX ORDER — ONDANSETRON 4 MG/1
8 TABLET, ORALLY DISINTEGRATING ORAL EVERY 6 HOURS PRN
Qty: 60 TABLET | Refills: 3 | Status: SHIPPED | OUTPATIENT
Start: 2025-01-02

## 2025-01-02 RX ORDER — ALUMINUM HYDROXIDE, MAGNESIUM HYDROXIDE, AND SIMETHICONE 2400; 240; 2400 MG/30ML; MG/30ML; MG/30ML
5 SUSPENSION ORAL 4 TIMES DAILY PRN
Qty: 100 ML | Refills: 3 | Status: SHIPPED | OUTPATIENT
Start: 2025-01-02 | End: 2026-01-02

## 2025-01-02 RX ORDER — TEMOZOLOMIDE 180 MG/1
180 CAPSULE ORAL DAILY
Qty: 4 CAPSULE | Refills: 0 | Status: ACTIVE | OUTPATIENT
Start: 2025-01-03 | End: 2025-01-07

## 2025-01-02 RX ORDER — NYSTATIN 100000 [USP'U]/ML
5 SUSPENSION ORAL 4 TIMES DAILY PRN
Qty: 60 ML | Refills: 3 | Status: SHIPPED | OUTPATIENT
Start: 2025-01-02 | End: 2025-01-14

## 2025-01-02 RX ORDER — LIDOCAINE HYDROCHLORIDE 20 MG/ML
SOLUTION OROPHARYNGEAL 4 TIMES DAILY PRN
Qty: 100 ML | Refills: 3 | Status: SHIPPED | OUTPATIENT
Start: 2025-01-02

## 2025-01-02 RX ORDER — TEMOZOLOMIDE 20 MG/1
40 CAPSULE ORAL DAILY
Qty: 8 CAPSULE | Refills: 0 | Status: ACTIVE | OUTPATIENT
Start: 2025-01-03 | End: 2025-01-07

## 2025-01-02 NOTE — PROGRESS NOTES
Subjective:       Patient ID: Luis Gomes is a 34 y.o. male.    Chief Complaint: Astrocytoma brain tumor (Fatigued, brittle teeth and blurry/vision loss bilateral eye. )    Diagnosis: Grade III Astrocytoma    Current Treatment:   Temozolomide 225 mg daily with radiation -started 11/18/2024    Treatment History:   10/15/24 Craniotomy- Dr. Cole Serna Zen      HPI  33yo presented as a hospital consultation in October '24 for Grade III Astrocytoma. He initially presented to hospital with new onset seizure in September '24, workup including CT head revealed concern for CNS mass. He was transferred to [facility] for further evaluation where MRI Brain demonstrated numerous enhancing linear and nodular lesions within the right temporal lobe as well as bilateral enhancing lesions within cerebellar peduncles. He underwent craniotomy for biopsy and partial resection with Dr. Serna on 10/15. 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 IDH1 mutated. Plan to proceed with concurrent radiation with temozolamide followed by 1 year maintenance TMZ alone. He understands the palliative nature of his treatments and prognosis has been discussed.     Interval History:   Patient presents to clinic for toxicity check. He started radiation and temozolomide on 11/18/2024. He reports being compliant with his temodar 225 mg daily. He continues with imbalance, fatigue. Nausea is controlled with zofran. He does have dizziness and blurred vision that is unchanged. His PCP is managing his blood sugar. He will complete radiation on 1/6/2025. MRI brain currently scheduled 1/15/2025. He does note thrush to  mouth, he has not been using nystatin mouth wash as it hurts. He denies peripheral edema, skin rash, constipation, diarrhea, infection, fever or headache. Labs reviewed in detail with patient.       Past Medical History:   Diagnosis Date    Anxiety     Bipolar disorder 03/04/2022    Brain mass     Depression      Fatty liver     High blood sugar     Insomnia     Posttraumatic stress disorder 2019    Seizures     Sleep apnea       Past Surgical History:   Procedure Laterality Date    CRANIOTOMY, WITH NEOPLASM EXCISION USING COMPUTER-ASSISTED NAVIGATION Right 10/15/2024    Procedure: CRANIOTOMY, WITH NEOPLASM EXCISION USING COMPUTER-ASSISTED NAVIGATION;  Surgeon: Cole Smith MD;  Location: [facility] OR;  Service: Neurosurgery;  Laterality: Right;  right temporal crani for excision of tumor  Stealth // ULTRASOUND // TIVA SETUP  NTI (meps, sseps, eeg, surbcortical stim - need grid)     Social History     Socioeconomic History    Marital status: Single   Tobacco Use    Smoking status: Every Day     Current packs/day: 0.00     Types: Vaping with nicotine, Cigarettes     Last attempt to quit: 10/2024     Years since quittin.2    Smokeless tobacco: Never   Substance and Sexual Activity    Alcohol use: Yes     Comment: beer occ    Drug use: Yes     Types: Marijuana     Comment: medical marijuana     Social Drivers of Health     Financial Resource Strain: Low Risk  (10/16/2024)    Overall Financial Resource Strain (CARDIA)     Difficulty of Paying Living Expenses: Not very hard   Food Insecurity: No Food Insecurity (10/16/2024)    Hunger Vital Sign     Worried About Running Out of Food in the Last Year: Never true     Ran Out of Food in the Last Year: Never true   Transportation Needs: No Transportation Needs (10/16/2024)    TRANSPORTATION NEEDS     Transportation : No   Physical Activity: Inactive (10/16/2024)    Exercise Vital Sign     Days of Exercise per Week: 0 days     Minutes of Exercise per Session: 0 min   Stress: Stress Concern Present (10/16/2024)    [employer] [place] [employer] - Occupational Stress Questionnaire     Feeling of Stress : To some extent   Housing Stability: Low Risk  (10/16/2024)    Housing Stability Vital Sign     Unable to Pay for Housing in the Last Year: No     Homeless in  the Last Year: No      Family History   Problem Relation Name Age of Onset    Mental illness Mother      Mental illness Father      Heart disease Father        Review of patient's allergies indicates:   Allergen Reactions    Tree nut       Review of Systems   Constitutional:  Positive for fatigue. Negative for appetite change and unexpected weight change.   HENT:  Negative for mouth sores.    Eyes:  Positive for visual disturbance.   Respiratory:  Negative for cough and shortness of breath.    Cardiovascular:  Negative for chest pain.   Gastrointestinal:  Negative for abdominal pain and diarrhea.   Genitourinary:  Positive for frequency.   Musculoskeletal:  Negative for back pain.   Integumentary:  Negative for rash.   Neurological:  Positive for dizziness and coordination difficulties. Negative for headaches.   Hematological:  Negative for adenopathy.   Psychiatric/Behavioral:  The patient is nervous/anxious.          Objective:      Vitals:    01/02/25 1101   BP: 108/79   Pulse: (!) 122   Resp: 12   Temp: 97.9 °F (36.6 °C)           Physical Exam  Constitutional:       General: He is not in acute distress.     Appearance: Normal appearance. He is obese. He is not ill-appearing.   HENT:      Head: Normocephalic and atraumatic.      Nose: Nose normal.      Mouth/Throat:      Mouth: Mucous membranes are moist.      Pharynx: Oropharynx is clear.   Eyes:      Extraocular Movements: Extraocular movements intact.      Conjunctiva/sclera: Conjunctivae normal.      Pupils: Pupils are equal, round, and reactive to light.   Cardiovascular:      Rate and Rhythm: Normal rate and regular rhythm.      Pulses: Normal pulses.      Heart sounds: Normal heart sounds. No murmur heard.  Pulmonary:      Effort: Pulmonary effort is normal. No respiratory distress.      Breath sounds: Normal breath sounds.   Abdominal:      General: There is no distension.      Palpations: Abdomen is soft.      Tenderness: There is no abdominal  tenderness.   Musculoskeletal:         General: Normal range of motion.      Cervical back: Normal range of motion and neck supple.      Right lower leg: No edema.      Left lower leg: No edema.   Lymphadenopathy:      Cervical: No cervical adenopathy.   Skin:     General: Skin is warm and dry.   Neurological:      General: No focal deficit present.      Mental Status: He is alert and oriented to person, place, and time.       LABS AND IMAGING REVIEWED IN EPIC  09/27/24 MRI Brain   1.  Right temporal lobe numerous enhancing linear and nodular lesions which extend medially along midbrain margin and there are additional enhancing lesions bilateral cerebellar peduncles.  Also heterogeneity and some enhancement of the right aspect of midbrain.  Surrounding substantial brain edema, mass effect and midline shift.  Findings are concerning to be neoplasm and could be metastatic.  2.  No acute brain infarct.    11/15/24 MRI Brain  Large infiltrative tumor centered in the right temporal lobe, invading the basal cisterns and 4th ventricle as above.     While the tumor is overall stable in size, there is an area of increasing nodular enhancement in the region of the right thalamus and internal capsule    Pathology:  10/15/24 Biopsy  1. RIGHT TEMPORAL TUMOR:   ANAPLASTIC GEMISTOCYTIC ASTROCYTOMA, WHO GRADE III.   2. RIGHT TEMPORAL TUMOR PERMANENT:   ANAPLASTIC GEMISTOCYTIC ASTROCYTOMA, WHO GRADE   The following CLINICALLY RELEVANT VARIANT was detected:   Gene:  IDH1   DNA Change:  c.395G>A (Exon 4)   Amino Acid Change:  p.R132H (Arg)132His)   Variant Allele Frequency:  39.9%   NO reportable CLINICALLY RELEVANT SEQUENCE VARIANTS detected within the analyzed   regiohns of TERT gene.      Assessment:   Grade III Astrocytoma - IDH1 mutation positive. Discussed first line recommendation of concurrent RT + TMZ followed by maintenance TMZ x 1 year.   Plan for TMZ 225mg Daily for 42 days straight (including weekends without XRT) with  concurrent XRT.  Then repeat MRI brain 4 weeks after completing treatment prior to starting maintenance TMZ.   Repeat MRI scheduled for 1/15/2025  Maintenance TMZ 150mg/m2 days 1-5 of 28 day cycle sent to specialty pharmacy. He was instructed to not begin until follow-up with MD.    Immunodeficiency due to Drug Therapy   Precautions discussed with patient. Continue to monitor for any signs of symptoms of infection. No prophylaxis needed at this time. No role for growth factor.   Blurred vision   Present prior to treatment, stable and unchanged   Encouraged follow-up with ophthalmology.   Elevated LFTs  Hepatomegaly with steatosis noted on abd US 9/27/2024.  No dosage adjustments necessary per UpToDate.   Bilirubin normal.   Continue to follow closely  Thrombocytopenia   83K today. No indication to hold med at this time.     Plan:   Labs and exam stable.  Continues with Temodar 225 mg daily + XRT. He will complete on 1/6/2025  Continue with PCP for hyperglycemia   Repeat MRI scheduled for 1/15/2025  Continue Bactrim DS once daily for PJP prophylaxis while on temodar   Steroid taper per rad/onc, currently on 1mg  He does have HH twice weekly  Lab only in 2 weeks.   RTC in four weeks with MD for FU/lab/scan review/treatment planning.  -CBC CMP    I spent a total of 40 minutes on the day of the visit.This includes face to face time and non-face to face time preparing to see the patient (eg, review of tests), obtaining and/or reviewing separately obtained history, documenting clinical information in the electronic or other health record, independently interpreting results and communicating results to the patient/family/caregiver, or care coordinator.    Fawn Power, KARENP-C  Oncology/Hematology   Cancer Center [facility]

## 2025-01-07 DIAGNOSIS — C71.9 ASTROCYTOMA BRAIN TUMOR: Primary | ICD-10-CM

## 2025-01-07 RX ORDER — TEMOZOLOMIDE 180 MG/1
180 CAPSULE ORAL DAILY
Qty: 5 CAPSULE | Refills: 0 | Status: ACTIVE | OUTPATIENT
Start: 2025-01-07 | End: 2025-01-12

## 2025-01-07 RX ORDER — TEMOZOLOMIDE 250 MG/1
250 CAPSULE ORAL DAILY
Qty: 5 CAPSULE | Refills: 0 | Status: ACTIVE | OUTPATIENT
Start: 2025-01-07 | End: 2025-01-12

## 2025-01-07 RX ORDER — TEMOZOLOMIDE 5 MG/1
15 CAPSULE ORAL DAILY
Qty: 15 CAPSULE | Refills: 0 | Status: ACTIVE | OUTPATIENT
Start: 2025-01-07 | End: 2025-01-12

## 2025-01-13 ENCOUNTER — TELEPHONE (OUTPATIENT)
Dept: HEMATOLOGY/ONCOLOGY | Facility: CLINIC | Age: 35
End: 2025-01-13
Payer: MEDICAID

## 2025-01-13 NOTE — TELEPHONE ENCOUNTER
----- Message from Nurse Rodríguez sent at 1/13/2025  9:30 AM CST -----  Regarding: stopping treatment  Patient contacted clinic this AM to inform that he no longer wishes to proceed with treatment with Temozolamide. He stated he is emotional tired and can no longer proceed with treatment. He has a follow up MRI Brain on 1/15 and neurology follow up 1/22. He states he still wishes to be seen with our clinic on 1/30 with Dr. Lejeune

## 2025-01-15 ENCOUNTER — HOSPITAL ENCOUNTER (OUTPATIENT)
Dept: RADIOLOGY | Facility: HOSPITAL | Age: 35
Discharge: HOME | End: 2025-01-15
Payer: MEDICAID

## 2025-01-15 DIAGNOSIS — G93.89 BRAIN MASS: ICD-10-CM

## 2025-01-15 PROCEDURE — A9577 INJ MULTIHANCE: HCPCS

## 2025-01-15 PROCEDURE — 70553 MRI BRAIN STEM W/O & W/DYE: CPT | Mod: TC

## 2025-01-15 PROCEDURE — 25500020 PHARM REV CODE 255

## 2025-01-15 RX ADMIN — GADOBENATE DIMEGLUMINE 20 ML: 529 INJECTION, SOLUTION INTRAVENOUS at 10:01

## 2025-01-20 ENCOUNTER — HOSPITAL ENCOUNTER (INPATIENT)
Facility: HOSPITAL | Age: 35
LOS: 2 days | Discharge: HOME | DRG: 175 | End: 2025-01-22
Attending: INTERNAL MEDICINE | Admitting: FAMILY MEDICINE
Payer: MEDICAID

## 2025-01-20 DIAGNOSIS — D69.6 THROMBOCYTOPENIA: ICD-10-CM

## 2025-01-20 DIAGNOSIS — R06.02 SOB (SHORTNESS OF BREATH): ICD-10-CM

## 2025-01-20 DIAGNOSIS — R06.02 SHORTNESS OF BREATH: ICD-10-CM

## 2025-01-20 DIAGNOSIS — C71.9 ASTROCYTOMA BRAIN TUMOR: ICD-10-CM

## 2025-01-20 DIAGNOSIS — F41.9 ANXIETY: ICD-10-CM

## 2025-01-20 DIAGNOSIS — I26.99 PULMONARY EMBOLUS AND INFARCTION: Primary | ICD-10-CM

## 2025-01-20 DIAGNOSIS — I26.02 SADDLE EMBOLUS OF PULMONARY ARTERY WITH ACUTE COR PULMONALE, UNSPECIFIED CHRONICITY: ICD-10-CM

## 2025-01-20 DIAGNOSIS — R07.9 CHEST PAIN: ICD-10-CM

## 2025-01-20 LAB
ALBUMIN SERPL-MCNC: 4.1 G/DL (ref 3.4–5)
ALBUMIN/GLOB SERPL: 1.4 RATIO
ALP SERPL-CCNC: 46 UNIT/L (ref 50–144)
ALT SERPL-CCNC: 226 UNIT/L (ref 1–45)
ANION GAP SERPL CALC-SCNC: 5 MEQ/L (ref 2–13)
AST SERPL-CCNC: 157 UNIT/L (ref 17–59)
BASE EXCESS BLD CALC-SCNC: 0.9 MMOL/L (ref -2–2)
BASOPHILS # BLD AUTO: 0 X10(3)/MCL (ref 0.01–0.08)
BASOPHILS NFR BLD AUTO: 0 % (ref 0.1–1.2)
BILIRUB SERPL-MCNC: 0.8 MG/DL (ref 0–1)
BLOOD GAS SAMPLE TYPE (OHS): ABNORMAL
BNP BLD-MCNC: 274 PG/ML (ref 0–124.9)
BUN SERPL-MCNC: 25 MG/DL (ref 7–20)
CALCIUM SERPL-MCNC: 8.8 MG/DL (ref 8.4–10.2)
CHLORIDE SERPL-SCNC: 98 MMOL/L (ref 98–110)
CO2 SERPL-SCNC: 27 MMOL/L (ref 21–32)
COHGB MFR BLDA: 1.7 % (ref 0–1.5)
CREAT SERPL-MCNC: 0.95 MG/DL (ref 0.66–1.25)
CREAT/UREA NIT SERPL: 26 (ref 12–20)
D DIMER PPP IA.FEU-MCNC: 5.02 MG/L (ref 0.19–0.5)
EOSINOPHIL # BLD AUTO: 0.01 X10(3)/MCL (ref 0.04–0.54)
EOSINOPHIL NFR BLD AUTO: 0.4 % (ref 0.7–7)
ERYTHROCYTE [DISTWIDTH] IN BLOOD BY AUTOMATED COUNT: 14.4 %
GFR SERPLBLD CREATININE-BSD FMLA CKD-EPI: >90 ML/MIN/1.73/M2
GLOBULIN SER-MCNC: 2.9 GM/DL (ref 2–3.9)
GLUCOSE SERPL-MCNC: 169 MG/DL (ref 70–115)
HCO3 BLDA-SCNC: 25.1 MMOL/L (ref 22–26)
HCT VFR BLD AUTO: 36.2 % (ref 36–52)
HGB BLD-MCNC: 13 G/DL (ref 13–18)
IMM GRANULOCYTES # BLD AUTO: 0.03 X10(3)/MCL (ref 0–0.03)
IMM GRANULOCYTES NFR BLD AUTO: 1.2 % (ref 0–0.5)
INFLUENZA A (OHS): NEGATIVE
INFLUENZA B (OHS): NEGATIVE
INHALED O2 CONCENTRATION: 100 %
INR PPP: 1
IP (OHS): 0 CMH2O
LACTATE SERPL-SCNC: 2.3 MMOL/L (ref 0.4–2)
LPM (OHS): 15
LYMPHOCYTES # BLD AUTO: 0.28 X10(3)/MCL (ref 1.32–3.57)
LYMPHOCYTES NFR BLD AUTO: 10.8 % (ref 20–55)
MAGNESIUM SERPL-MCNC: 2.1 MG/DL (ref 1.8–2.4)
MCH RBC QN AUTO: 35.3 PG (ref 27–34)
MCHC RBC AUTO-ENTMCNC: 35.9 G/DL (ref 31–37)
MCV RBC AUTO: 98.4 FL (ref 79–99)
METHGB MFR BLDA: 0.4 % (ref 0–1.5)
MONOCYTES # BLD AUTO: 0.18 X10(3)/MCL (ref 0.3–0.82)
MONOCYTES NFR BLD AUTO: 6.9 % (ref 4.7–12.5)
NEUTROPHILS # BLD AUTO: 2.1 X10(3)/MCL (ref 1.78–5.38)
NEUTROPHILS NFR BLD AUTO: 80.7 % (ref 37–73)
NRBC BLD AUTO-RTO: 0.8 %
OXYGEN DEVICE BLOOD GAS (OHS): ABNORMAL
PAW @ PEAK INSP FLOW SETTING VENT: 0 CMH20
PCO2 BLDA: 45.7 MMHG (ref 35–45)
PH BLDA: 7.37 [PH] (ref 7.35–7.45)
PLATELET # BLD AUTO: 76 X10(3)/MCL (ref 140–371)
PMV BLD AUTO: 9.8 FL (ref 9.4–12.4)
PO2 BLDA: 64.2 MMHG (ref 80–105)
POTASSIUM SERPL-SCNC: 4.4 MMOL/L (ref 3.5–5.1)
PROT SERPL-MCNC: 7 GM/DL (ref 6.3–8.2)
PROTHROMBIN TIME: 10 SECONDS (ref 9.3–11.9)
RBC # BLD AUTO: 3.68 X10(6)/MCL (ref 4–6)
SAO2 % BLDA: 91.6 % (ref 95–100)
SARS-COV-2 RDRP RESP QL NAA+PROBE: NEGATIVE
SODIUM SERPL-SCNC: 130 MMOL/L (ref 136–145)
TROPONIN I SERPL-MCNC: 0.09 NG/ML (ref 0–0.03)
WBC # BLD AUTO: 2.6 X10(3)/MCL (ref 4–11.5)

## 2025-01-20 PROCEDURE — 80053 COMPREHEN METABOLIC PANEL: CPT | Performed by: INTERNAL MEDICINE

## 2025-01-20 PROCEDURE — 85379 FIBRIN DEGRADATION QUANT: CPT | Performed by: INTERNAL MEDICINE

## 2025-01-20 PROCEDURE — 94660 CPAP INITIATION&MGMT: CPT

## 2025-01-20 PROCEDURE — 63600175 PHARM REV CODE 636 W HCPCS: Performed by: INTERNAL MEDICINE

## 2025-01-20 PROCEDURE — 25000242 PHARM REV CODE 250 ALT 637 W/ HCPCS: Performed by: INTERNAL MEDICINE

## 2025-01-20 PROCEDURE — 5A09357 ASSISTANCE WITH RESPIRATORY VENTILATION, LESS THAN 24 CONSECUTIVE HOURS, CONTINUOUS POSITIVE AIRWAY PRESSURE: ICD-10-PCS | Performed by: FAMILY MEDICINE

## 2025-01-20 PROCEDURE — 83605 ASSAY OF LACTIC ACID: CPT | Performed by: INTERNAL MEDICINE

## 2025-01-20 PROCEDURE — 96375 TX/PRO/DX INJ NEW DRUG ADDON: CPT

## 2025-01-20 PROCEDURE — 21400001 HC TELEMETRY ROOM

## 2025-01-20 PROCEDURE — 87040 BLOOD CULTURE FOR BACTERIA: CPT | Performed by: INTERNAL MEDICINE

## 2025-01-20 PROCEDURE — 94640 AIRWAY INHALATION TREATMENT: CPT

## 2025-01-20 PROCEDURE — 36600 WITHDRAWAL OF ARTERIAL BLOOD: CPT

## 2025-01-20 PROCEDURE — 85025 COMPLETE CBC W/AUTO DIFF WBC: CPT | Performed by: INTERNAL MEDICINE

## 2025-01-20 PROCEDURE — 99285 EMERGENCY DEPT VISIT HI MDM: CPT | Mod: 25

## 2025-01-20 PROCEDURE — 99900031 HC PATIENT EDUCATION (STAT)

## 2025-01-20 PROCEDURE — 83880 ASSAY OF NATRIURETIC PEPTIDE: CPT | Performed by: INTERNAL MEDICINE

## 2025-01-20 PROCEDURE — 27100171 HC OXYGEN HIGH FLOW UP TO 24 HOURS

## 2025-01-20 PROCEDURE — 63600175 PHARM REV CODE 636 W HCPCS: Mod: JZ,TB | Performed by: INTERNAL MEDICINE

## 2025-01-20 PROCEDURE — 94761 N-INVAS EAR/PLS OXIMETRY MLT: CPT | Mod: XB

## 2025-01-20 PROCEDURE — 93010 ELECTROCARDIOGRAM REPORT: CPT | Mod: ,,, | Performed by: INTERNAL MEDICINE

## 2025-01-20 PROCEDURE — 25000003 PHARM REV CODE 250: Performed by: INTERNAL MEDICINE

## 2025-01-20 PROCEDURE — 96365 THER/PROPH/DIAG IV INF INIT: CPT

## 2025-01-20 PROCEDURE — 84484 ASSAY OF TROPONIN QUANT: CPT | Performed by: INTERNAL MEDICINE

## 2025-01-20 PROCEDURE — U0002 COVID-19 LAB TEST NON-CDC: HCPCS | Performed by: INTERNAL MEDICINE

## 2025-01-20 PROCEDURE — 27000190 HC CPAP FULL FACE MASK W/VALVE

## 2025-01-20 PROCEDURE — 99900035 HC TECH TIME PER 15 MIN (STAT)

## 2025-01-20 PROCEDURE — 87400 INFLUENZA A/B EACH AG IA: CPT | Performed by: INTERNAL MEDICINE

## 2025-01-20 PROCEDURE — 83735 ASSAY OF MAGNESIUM: CPT | Performed by: INTERNAL MEDICINE

## 2025-01-20 PROCEDURE — 93005 ELECTROCARDIOGRAM TRACING: CPT

## 2025-01-20 PROCEDURE — 82803 BLOOD GASES ANY COMBINATION: CPT

## 2025-01-20 PROCEDURE — 85610 PROTHROMBIN TIME: CPT | Performed by: INTERNAL MEDICINE

## 2025-01-20 RX ORDER — METHYLPREDNISOLONE SOD SUCC 125 MG
125 VIAL (EA) INJECTION
Status: COMPLETED | OUTPATIENT
Start: 2025-01-20 | End: 2025-01-20

## 2025-01-20 RX ORDER — BUDESONIDE 0.5 MG/2ML
0.5 INHALANT ORAL ONCE
Status: COMPLETED | OUTPATIENT
Start: 2025-01-20 | End: 2025-01-20

## 2025-01-20 RX ORDER — IPRATROPIUM BROMIDE AND ALBUTEROL SULFATE 2.5; .5 MG/3ML; MG/3ML
3 SOLUTION RESPIRATORY (INHALATION)
Status: COMPLETED | OUTPATIENT
Start: 2025-01-20 | End: 2025-01-20

## 2025-01-20 RX ADMIN — IPRATROPIUM BROMIDE AND ALBUTEROL SULFATE 3 ML: .5; 3 SOLUTION RESPIRATORY (INHALATION) at 10:01

## 2025-01-20 RX ADMIN — BUDESONIDE INHALATION 0.5 MG: 0.5 SUSPENSION RESPIRATORY (INHALATION) at 10:01

## 2025-01-20 RX ADMIN — PIPERACILLIN AND TAZOBACTAM 4.5 G: 4; .5 INJECTION, POWDER, FOR SOLUTION INTRAVENOUS; PARENTERAL at 11:01

## 2025-01-20 RX ADMIN — METHYLPREDNISOLONE SODIUM SUCCINATE 125 MG: 125 INJECTION, POWDER, FOR SOLUTION INTRAMUSCULAR; INTRAVENOUS at 10:01

## 2025-01-21 PROBLEM — I26.92 ACUTE SADDLE PULMONARY EMBOLISM WITHOUT ACUTE COR PULMONALE: Status: ACTIVE | Noted: 2025-01-21

## 2025-01-21 LAB
APTT PPP: 118 SECONDS (ref 23–29.4)
APTT PPP: 60 SECONDS
APTT PPP: 86.1 SECONDS (ref 23–29.4)
BASOPHILS # BLD AUTO: 0 X10(3)/MCL (ref 0.01–0.08)
BASOPHILS NFR BLD AUTO: 0 % (ref 0.1–1.2)
EOSINOPHIL # BLD AUTO: 0 X10(3)/MCL (ref 0.04–0.54)
EOSINOPHIL NFR BLD AUTO: 0 % (ref 0.7–7)
ERYTHROCYTE [DISTWIDTH] IN BLOOD BY AUTOMATED COUNT: 14.4 %
HCT VFR BLD AUTO: 34.9 % (ref 36–52)
HGB BLD-MCNC: 12.4 G/DL (ref 13–18)
IMM GRANULOCYTES # BLD AUTO: 0.02 X10(3)/MCL (ref 0–0.03)
IMM GRANULOCYTES NFR BLD AUTO: 0.5 % (ref 0–0.5)
INR PPP: 1
LACTATE SERPL-SCNC: 1.2 MMOL/L (ref 0.4–2)
LACTATE SERPL-SCNC: 2.2 MMOL/L (ref 0.4–2)
LYMPHOCYTES # BLD AUTO: 0.21 X10(3)/MCL (ref 1.32–3.57)
LYMPHOCYTES NFR BLD AUTO: 5.1 % (ref 20–55)
MCH RBC QN AUTO: 35.2 PG (ref 27–34)
MCHC RBC AUTO-ENTMCNC: 35.5 G/DL (ref 31–37)
MCV RBC AUTO: 99.1 FL (ref 79–99)
MONOCYTES # BLD AUTO: 0.16 X10(3)/MCL (ref 0.3–0.82)
MONOCYTES NFR BLD AUTO: 3.9 % (ref 4.7–12.5)
NEUTROPHILS # BLD AUTO: 3.74 X10(3)/MCL (ref 1.78–5.38)
NEUTROPHILS NFR BLD AUTO: 90.5 % (ref 37–73)
NRBC BLD AUTO-RTO: 0.5 %
PLATELET # BLD AUTO: 59 X10(3)/MCL (ref 140–371)
PLATELET # BLD EST: ABNORMAL 10*3/UL
PMV BLD AUTO: 8.9 FL (ref 9.4–12.4)
POCT GLUCOSE: 181 MG/DL (ref 70–110)
POCT GLUCOSE: 181 MG/DL (ref 70–110)
PROTHROMBIN TIME: 10.6 SECONDS (ref 9.3–11.9)
RBC # BLD AUTO: 3.52 X10(6)/MCL (ref 4–6)
TROPONIN I SERPL-MCNC: 0.11 NG/ML (ref 0–0.03)
TROPONIN I SERPL-MCNC: 0.11 NG/ML (ref 0–0.03)
WBC # BLD AUTO: 4.13 X10(3)/MCL (ref 4–11.5)

## 2025-01-21 PROCEDURE — 83605 ASSAY OF LACTIC ACID: CPT | Performed by: INTERNAL MEDICINE

## 2025-01-21 PROCEDURE — 63600175 PHARM REV CODE 636 W HCPCS: Performed by: INTERNAL MEDICINE

## 2025-01-21 PROCEDURE — 94761 N-INVAS EAR/PLS OXIMETRY MLT: CPT

## 2025-01-21 PROCEDURE — 85025 COMPLETE CBC W/AUTO DIFF WBC: CPT | Performed by: INTERNAL MEDICINE

## 2025-01-21 PROCEDURE — 11000001 HC ACUTE MED/SURG PRIVATE ROOM

## 2025-01-21 PROCEDURE — 25000003 PHARM REV CODE 250: Performed by: EMERGENCY MEDICINE

## 2025-01-21 PROCEDURE — 36415 COLL VENOUS BLD VENIPUNCTURE: CPT | Performed by: FAMILY MEDICINE

## 2025-01-21 PROCEDURE — 63600175 PHARM REV CODE 636 W HCPCS: Performed by: EMERGENCY MEDICINE

## 2025-01-21 PROCEDURE — 85610 PROTHROMBIN TIME: CPT | Performed by: INTERNAL MEDICINE

## 2025-01-21 PROCEDURE — 99900035 HC TECH TIME PER 15 MIN (STAT)

## 2025-01-21 PROCEDURE — 21400001 HC TELEMETRY ROOM

## 2025-01-21 PROCEDURE — 27000221 HC OXYGEN, UP TO 24 HOURS

## 2025-01-21 PROCEDURE — 84484 ASSAY OF TROPONIN QUANT: CPT | Performed by: EMERGENCY MEDICINE

## 2025-01-21 PROCEDURE — 85730 THROMBOPLASTIN TIME PARTIAL: CPT | Performed by: FAMILY MEDICINE

## 2025-01-21 PROCEDURE — 25500020 PHARM REV CODE 255: Performed by: INTERNAL MEDICINE

## 2025-01-21 PROCEDURE — 83605 ASSAY OF LACTIC ACID: CPT | Performed by: EMERGENCY MEDICINE

## 2025-01-21 PROCEDURE — 94660 CPAP INITIATION&MGMT: CPT

## 2025-01-21 PROCEDURE — 25000003 PHARM REV CODE 250: Performed by: FAMILY MEDICINE

## 2025-01-21 PROCEDURE — 84484 ASSAY OF TROPONIN QUANT: CPT | Performed by: INTERNAL MEDICINE

## 2025-01-21 PROCEDURE — 96375 TX/PRO/DX INJ NEW DRUG ADDON: CPT

## 2025-01-21 PROCEDURE — 85730 THROMBOPLASTIN TIME PARTIAL: CPT | Performed by: INTERNAL MEDICINE

## 2025-01-21 RX ORDER — SODIUM CHLORIDE 0.9 % (FLUSH) 0.9 %
10 SYRINGE (ML) INJECTION
Status: DISCONTINUED | OUTPATIENT
Start: 2025-01-21 | End: 2025-01-22 | Stop reason: HOSPADM

## 2025-01-21 RX ORDER — DULOXETIN HYDROCHLORIDE 20 MG/1
20 CAPSULE, DELAYED RELEASE ORAL DAILY
COMMUNITY
End: 2025-01-30

## 2025-01-21 RX ORDER — INSULIN GLARGINE 100 [IU]/ML
20 INJECTION, SOLUTION SUBCUTANEOUS NIGHTLY
Status: DISCONTINUED | OUTPATIENT
Start: 2025-01-22 | End: 2025-01-22 | Stop reason: HOSPADM

## 2025-01-21 RX ORDER — INSULIN ASPART 100 [IU]/ML
0-5 INJECTION, SOLUTION INTRAVENOUS; SUBCUTANEOUS
Status: DISCONTINUED | OUTPATIENT
Start: 2025-01-21 | End: 2025-01-22 | Stop reason: HOSPADM

## 2025-01-21 RX ORDER — QUETIAPINE FUMARATE 100 MG/1
200 TABLET, FILM COATED ORAL NIGHTLY
Status: DISCONTINUED | OUTPATIENT
Start: 2025-01-21 | End: 2025-01-22 | Stop reason: HOSPADM

## 2025-01-21 RX ORDER — ONDANSETRON 4 MG/1
8 TABLET, ORALLY DISINTEGRATING ORAL EVERY 6 HOURS PRN
Status: DISCONTINUED | OUTPATIENT
Start: 2025-01-21 | End: 2025-01-22 | Stop reason: HOSPADM

## 2025-01-21 RX ORDER — DEXAMETHASONE 4 MG/1
20 TABLET ORAL DAILY
Status: DISCONTINUED | OUTPATIENT
Start: 2025-01-21 | End: 2025-01-21

## 2025-01-21 RX ORDER — HEPARIN SODIUM 200 [USP'U]/100ML
1000 INJECTION, SOLUTION INTRAVENOUS
Status: DISCONTINUED | OUTPATIENT
Start: 2025-01-21 | End: 2025-01-21

## 2025-01-21 RX ORDER — DIPHENHYDRAMINE HCL 12.5MG/5ML
12.5 ELIXIR ORAL 4 TIMES DAILY PRN
Status: DISCONTINUED | OUTPATIENT
Start: 2025-01-21 | End: 2025-01-22 | Stop reason: HOSPADM

## 2025-01-21 RX ORDER — DULOXETIN HYDROCHLORIDE 30 MG/1
30 CAPSULE, DELAYED RELEASE ORAL DAILY
Status: DISCONTINUED | OUTPATIENT
Start: 2025-01-21 | End: 2025-01-22 | Stop reason: HOSPADM

## 2025-01-21 RX ORDER — NYSTATIN 100000 [USP'U]/ML
500000 SUSPENSION ORAL
Status: DISCONTINUED | OUTPATIENT
Start: 2025-01-21 | End: 2025-01-22 | Stop reason: HOSPADM

## 2025-01-21 RX ORDER — BUSPIRONE HYDROCHLORIDE 15 MG/1
15 TABLET ORAL 3 TIMES DAILY
Status: DISCONTINUED | OUTPATIENT
Start: 2025-01-21 | End: 2025-01-21

## 2025-01-21 RX ORDER — ONDANSETRON HYDROCHLORIDE 2 MG/ML
4 INJECTION, SOLUTION INTRAVENOUS EVERY 8 HOURS PRN
Status: DISCONTINUED | OUTPATIENT
Start: 2025-01-21 | End: 2025-01-22 | Stop reason: HOSPADM

## 2025-01-21 RX ORDER — ONDANSETRON HYDROCHLORIDE 2 MG/ML
4 INJECTION, SOLUTION INTRAVENOUS ONCE
Status: COMPLETED | OUTPATIENT
Start: 2025-01-21 | End: 2025-01-21

## 2025-01-21 RX ORDER — BUSPIRONE HYDROCHLORIDE 15 MG/1
15 TABLET ORAL 3 TIMES DAILY
Status: DISCONTINUED | OUTPATIENT
Start: 2025-01-21 | End: 2025-01-22 | Stop reason: HOSPADM

## 2025-01-21 RX ORDER — IBUPROFEN 200 MG
24 TABLET ORAL
Status: DISCONTINUED | OUTPATIENT
Start: 2025-01-21 | End: 2025-01-22 | Stop reason: HOSPADM

## 2025-01-21 RX ORDER — HEPARIN SODIUM 5000 [USP'U]/ML
5000 INJECTION, SOLUTION INTRAVENOUS; SUBCUTANEOUS
Status: DISCONTINUED | OUTPATIENT
Start: 2025-01-21 | End: 2025-01-21

## 2025-01-21 RX ORDER — INSULIN GLARGINE 100 [IU]/ML
20 INJECTION, SOLUTION SUBCUTANEOUS DAILY
Status: DISCONTINUED | OUTPATIENT
Start: 2025-01-21 | End: 2025-01-21

## 2025-01-21 RX ORDER — MORPHINE SULFATE 2 MG/ML
2 INJECTION, SOLUTION INTRAMUSCULAR; INTRAVENOUS EVERY 4 HOURS PRN
Status: DISCONTINUED | OUTPATIENT
Start: 2025-01-21 | End: 2025-01-22 | Stop reason: HOSPADM

## 2025-01-21 RX ORDER — GLUCAGON 1 MG
1 KIT INJECTION
Status: DISCONTINUED | OUTPATIENT
Start: 2025-01-21 | End: 2025-01-22 | Stop reason: HOSPADM

## 2025-01-21 RX ORDER — DULOXETIN HYDROCHLORIDE 30 MG/1
60 CAPSULE, DELAYED RELEASE ORAL DAILY
Status: DISCONTINUED | OUTPATIENT
Start: 2025-01-21 | End: 2025-01-22 | Stop reason: HOSPADM

## 2025-01-21 RX ORDER — ALBUTEROL SULFATE 0.83 MG/ML
2.5 SOLUTION RESPIRATORY (INHALATION) EVERY 4 HOURS PRN
Status: DISCONTINUED | OUTPATIENT
Start: 2025-01-21 | End: 2025-01-22 | Stop reason: HOSPADM

## 2025-01-21 RX ORDER — HEPARIN SODIUM,PORCINE/D5W 25000/250
0-40 INTRAVENOUS SOLUTION INTRAVENOUS CONTINUOUS
Status: DISCONTINUED | OUTPATIENT
Start: 2025-01-21 | End: 2025-01-22 | Stop reason: HOSPADM

## 2025-01-21 RX ORDER — ALPRAZOLAM 0.5 MG/1
0.5 TABLET ORAL 3 TIMES DAILY PRN
Status: DISCONTINUED | OUTPATIENT
Start: 2025-01-21 | End: 2025-01-22 | Stop reason: HOSPADM

## 2025-01-21 RX ORDER — HYDROXYZINE PAMOATE 50 MG/1
50 CAPSULE ORAL EVERY 8 HOURS PRN
Status: DISCONTINUED | OUTPATIENT
Start: 2025-01-21 | End: 2025-01-22 | Stop reason: HOSPADM

## 2025-01-21 RX ORDER — IBUPROFEN 200 MG
16 TABLET ORAL
Status: DISCONTINUED | OUTPATIENT
Start: 2025-01-21 | End: 2025-01-22 | Stop reason: HOSPADM

## 2025-01-21 RX ORDER — METOPROLOL TARTRATE 25 MG/1
25 TABLET, FILM COATED ORAL 2 TIMES DAILY
Status: DISCONTINUED | OUTPATIENT
Start: 2025-01-21 | End: 2025-01-22 | Stop reason: HOSPADM

## 2025-01-21 RX ORDER — LEVETIRACETAM 500 MG/1
1500 TABLET ORAL 2 TIMES DAILY
Status: DISCONTINUED | OUTPATIENT
Start: 2025-01-21 | End: 2025-01-22 | Stop reason: HOSPADM

## 2025-01-21 RX ORDER — INSULIN GLARGINE 100 [IU]/ML
20 INJECTION, SOLUTION SUBCUTANEOUS DAILY
COMMUNITY

## 2025-01-21 RX ORDER — LIDOCAINE HYDROCHLORIDE 20 MG/ML
10 SOLUTION OROPHARYNGEAL EVERY 4 HOURS PRN
Status: DISCONTINUED | OUTPATIENT
Start: 2025-01-21 | End: 2025-01-22 | Stop reason: HOSPADM

## 2025-01-21 RX ORDER — HYDROCODONE BITARTRATE AND ACETAMINOPHEN 5; 325 MG/1; MG/1
1 TABLET ORAL EVERY 4 HOURS PRN
Status: DISCONTINUED | OUTPATIENT
Start: 2025-01-21 | End: 2025-01-22 | Stop reason: HOSPADM

## 2025-01-21 RX ADMIN — BUSPIRONE HYDROCHLORIDE 15 MG: 7.5 TABLET ORAL at 09:01

## 2025-01-21 RX ADMIN — BUSPIRONE HYDROCHLORIDE 15 MG: 15 TABLET ORAL at 02:01

## 2025-01-21 RX ADMIN — PIPERACILLIN AND TAZOBACTAM 4.5 G: 4; .5 INJECTION, POWDER, FOR SOLUTION INTRAVENOUS; PARENTERAL at 09:01

## 2025-01-21 RX ADMIN — ONDANSETRON 4 MG: 2 INJECTION INTRAMUSCULAR; INTRAVENOUS at 08:01

## 2025-01-21 RX ADMIN — HEPARIN SODIUM 18 UNITS/KG/HR: 10000 INJECTION, SOLUTION INTRAVENOUS at 03:01

## 2025-01-21 RX ADMIN — LISINOPRIL: 10 TABLET ORAL at 02:01

## 2025-01-21 RX ADMIN — NYSTATIN 500000 UNITS: 100000 SUSPENSION ORAL at 04:01

## 2025-01-21 RX ADMIN — QUETIAPINE FUMARATE 200 MG: 100 TABLET ORAL at 09:01

## 2025-01-21 RX ADMIN — DULOXETINE HYDROCHLORIDE 60 MG: 30 CAPSULE, DELAYED RELEASE ORAL at 02:01

## 2025-01-21 RX ADMIN — IOHEXOL 100 ML: 350 INJECTION, SOLUTION INTRAVENOUS at 12:01

## 2025-01-21 RX ADMIN — DEXAMETHASONE 20 MG: 4 TABLET ORAL at 09:01

## 2025-01-21 RX ADMIN — ALPRAZOLAM 0.5 MG: 0.5 TABLET ORAL at 03:01

## 2025-01-21 RX ADMIN — HEPARIN SODIUM 18 UNITS/KG/HR: 10000 INJECTION, SOLUTION INTRAVENOUS at 11:01

## 2025-01-21 RX ADMIN — ALPRAZOLAM 0.5 MG: 0.5 TABLET ORAL at 09:01

## 2025-01-21 RX ADMIN — HYDROXYZINE PAMOATE 50 MG: 50 CAPSULE ORAL at 09:01

## 2025-01-21 RX ADMIN — NYSTATIN 500000 UNITS: 100000 SUSPENSION ORAL at 09:01

## 2025-01-21 RX ADMIN — METOPROLOL TARTRATE 25 MG: 25 TABLET, FILM COATED ORAL at 09:01

## 2025-01-21 RX ADMIN — LEVETIRACETAM 1500 MG: 500 TABLET, FILM COATED ORAL at 09:01

## 2025-01-21 RX ADMIN — BUSPIRONE HYDROCHLORIDE 15 MG: 15 TABLET ORAL at 09:01

## 2025-01-21 RX ADMIN — DULOXETINE HYDROCHLORIDE 30 MG: 30 CAPSULE, DELAYED RELEASE ORAL at 02:01

## 2025-01-21 NOTE — ED NOTES
0118   Transfer Order Received     Ordered By: Dieter Abernathy DO   Callback: [phone]   Request created via order by Dieter Abernathy DO  Reason for transfer: Higher Level of Care  Diagnosis: Saddle pulmonary embolus  Patient class: IP- Inpatient  Is this a behavorial health placement that has been medically screened and is ready for transfer? (Labs should be resulted at this point): No  Service: Cardiovascular Surgery  Contact information: [phone]  Referring location: [facility]  Referring provider: Dieter Abernathy DO  Request type: Physician Order  Please explain request for higher level of care: Saddle pulmonary embolus, right heart strain, thrombocytopenia  Requested Patient Class: IP- Inpatient  Requested specialty: Cardiovascular Surgery  Is a specialist or procedure needed?: Yes     Patient admitted to [facility] EMERGENCY DEPARTMENT in room 01 ER/01 ER.
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
## 2025-01-21 NOTE — ED NOTES
VIVIEN WITH RESPIRATORY AT BEDSIDE AT PRESENT; INITIATING BIPAP AT PRESENT  PT. ARRIVED ON 15 L PER NONREBREATHER,  SATS 89-94%.  RESPIRATORY NOTIFIED FOR TREATMENTS AND ABG ORDERS  PT. HAS AUDIBLY WET CRACKLY  BREATH SOUNDS
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
## 2025-01-21 NOTE — ED NOTES
0132   Call from Cruz Redmond MD   [No callback number listed]   Contact Role: Accepting Provider   Entered By: VIDYA Pozo Dr. presented to Dr. Redmond, recommends starting hep gtt, if ct head is negative for bleed.  recommends discussing with family DNI request would be pointless if patient codes.compressions would be pointless. Dr. Abernathy will further discuss code status and call back after CT head is done      0127   Considering [facility] as Transfer Destination     Transfer type set to ED to ED by Jason Velazco RN

## 2025-01-21 NOTE — ED NOTES
PT. TRANSFERRED VIA HOVERMAT TO HOSPITAL BED AT PRESENT. PT. UPDATED ON PLAN OF CARE, R/T DEFERRED TRANSPORT. PT VERBALIZED UNDERSTANDING.
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
## 2025-01-21 NOTE — PLAN OF CARE
Problem: Adult Inpatient Plan of Care  Goal: Plan of Care Review  1/21/2025 1344 by Evangelina Navarro RN  Outcome: Progressing  1/21/2025 1344 by Evangelina Navarro RN  Outcome: Progressing  Goal: Patient-Specific Goal (Individualized)  1/21/2025 1344 by Evangelina Navarro RN  Outcome: Progressing  1/21/2025 1344 by Evangelina Navarro RN  Outcome: Progressing  Goal: Absence of Hospital-Acquired Illness or Injury  1/21/2025 1344 by Evangelina Navarro RN  Outcome: Progressing  1/21/2025 1344 by Evangelina Navarro RN  Outcome: Progressing  Goal: Optimal Comfort and Wellbeing  1/21/2025 1344 by Evangelina Navarro RN  Outcome: Progressing  1/21/2025 1344 by Evangelina Navarro RN  Outcome: Progressing  Goal: Readiness for Transition of Care  1/21/2025 1344 by Evangelina Navarro RN  Outcome: Progressing  1/21/2025 1344 by Evangelina Navarro RN  Outcome: Progressing     Problem: Bariatric Environmental Safety  Goal: Safety Maintained with Care  1/21/2025 1344 by Evangelina Navarro RN  Outcome: Progressing  1/21/2025 1344 by Evangelina Navarro RN  Outcome: Progressing     Problem: Wound  Goal: Optimal Coping  1/21/2025 1344 by Evangelina Navarro RN  Outcome: Progressing  1/21/2025 1344 by Evangelina Navarro RN  Outcome: Progressing  Goal: Optimal Functional Ability  1/21/2025 1344 by Evangelina Navarro RN  Outcome: Progressing  1/21/2025 1344 by Evangelina Navarro RN  Outcome: Progressing  Goal: Absence of Infection Signs and Symptoms  1/21/2025 1344 by Evangelina Navarro RN  Outcome: Progressing  1/21/2025 1344 by Evangelina Navarro RN  Outcome: Progressing  Goal: Improved Oral Intake  1/21/2025 1344 by Evangelina Navarro RN  Outcome: Progressing  1/21/2025 1344 by Evangelina Navarro RN  Outcome: Progressing  Goal: Optimal Pain Control and Function  1/21/2025 1344 by Evangelina Navarro RN  Outcome: Progressing  1/21/2025 1344 by Evangelina Navarro RN  Outcome: Progressing  Goal: Skin Health and Integrity  1/21/2025 1344 by Ramon, Evangelina, RN  Outcome: Progressing  1/21/2025 1344 by  Evangelina Navarro, RN  Outcome: Progressing  Goal: Optimal Wound Healing  1/21/2025 1344 by Evangelina Navarro, RN  Outcome: Progressing  1/21/2025 1344 by Evangelina Navarro, RN  Outcome: Progressing

## 2025-01-21 NOTE — ED NOTES
DR. MEZA ON THE TELECARDIOLOGY CART WITH [facility] AT PRESENT    RIO WITH TRANSFER CENTER ON THE PHONE PRESENTLY AS WELL, PT. HAS ACCEPTANCE     ACCEPTING FACILITY: [facility] ED  ACCEPTING MD: DR. PIMENTEL  # FOR REPORT: [phone]
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
## 2025-01-21 NOTE — ED PROVIDER NOTES
Encounter Date: 1/20/2025       History     Chief Complaint   Patient presents with    Shortness of Breath     SOB  STARTED 2 HRS AGO  HX BRAIN CANCER  MODERATE RESP DISTRESS UPON ARRIVAL     34-year-old male patient came into the emergency room with history of having shortness of breath for the last 2 hours prior to arrival.  Patient is in respiratory distress on arrival.  However he denies chest pain or palpitations.  Speaking in full sentences.  Patient does have underlying history of brain cancer/astrocytoma stage III and he had craniotomy and chemotherapy.  Currently patient is on do not intubate code status.  Patient states he can get chest compressions if needed but does not want to get intubated or put on ventilator.  This has been confirmed by the patient and his mother at the bedside who is the power of .        Review of patient's allergies indicates:   Allergen Reactions    Tree nut      Past Medical History:   Diagnosis Date    Anxiety     Bipolar disorder 03/04/2022    Brain mass     Depression     Fatty liver     High blood sugar     Insomnia     Posttraumatic stress disorder 03/19/2019    Seizures     Sleep apnea      Past Surgical History:   Procedure Laterality Date    CRANIOTOMY, WITH NEOPLASM EXCISION USING COMPUTER-ASSISTED NAVIGATION Right 10/15/2024    Procedure: CRANIOTOMY, WITH NEOPLASM EXCISION USING COMPUTER-ASSISTED NAVIGATION;  Surgeon: Cole Smith MD;  Location: [facility] OR;  Service: Neurosurgery;  Laterality: Right;  right temporal crani for excision of tumor  Stealth // ULTRASOUND // TIVA SETUP  NTI (meps, sseps, eeg, surbcortical stim - need grid)     Family History   Problem Relation Name Age of Onset    Mental illness Mother      Mental illness Father      Heart disease Father       Social History     Tobacco Use    Smoking status: Every Day     Current packs/day: 0.00     Types: Vaping with nicotine, Cigarettes     Last attempt to quit: 10/2024     Years since  quittin.3    Smokeless tobacco: Never   Substance Use Topics    Alcohol use: Yes     Comment: beer occ    Drug use: Yes     Types: Marijuana     Comment: medical marijuana     Review of Systems   Constitutional:  Positive for fatigue.   HENT:  Negative for ear pain and facial swelling.    Eyes:  Negative for pain.   Respiratory:  Positive for cough and shortness of breath.    Cardiovascular:  Negative for chest pain and palpitations.   Gastrointestinal:  Negative for diarrhea.   Endocrine: Negative for polydipsia, polyphagia and polyuria.   Genitourinary:  Negative for dysuria.   Musculoskeletal:  Negative for arthralgias and back pain.   Skin:  Negative for rash.   Neurological:  Negative for seizures, facial asymmetry, light-headedness, numbness and headaches.   Psychiatric/Behavioral:  Negative for behavioral problems.    All other systems reviewed and are negative.      Physical Exam     Initial Vitals   BP Pulse Resp Temp SpO2   25 2155 25 2155 25 2155 25 2156 25 2155   (!) 103/59 (!) 129 (!) 25 96 °F (35.6 °C) (!) 91 %      MAP       --                Physical Exam    Nursing note and vitals reviewed.  Constitutional: He appears well-developed and well-nourished.   HENT:   Head: Normocephalic and atraumatic.   Eyes: Conjunctivae and EOM are normal. Pupils are equal, round, and reactive to light.   Neck: Neck supple.   Normal range of motion.  Cardiovascular:  Normal heart sounds.           Pulmonary/Chest: He has wheezes.   Abdominal: Abdomen is soft. Bowel sounds are normal.   Musculoskeletal:         General: Normal range of motion.      Cervical back: Normal range of motion and neck supple.     Neurological: He is alert.   Skin: Skin is warm. Capillary refill takes less than 2 seconds.         ED Course   Procedures  Labs Reviewed   COMPREHENSIVE METABOLIC PANEL - Abnormal       Result Value    Sodium 130 (*)     Potassium 4.4      Chloride 98      CO2 27      Glucose 169  (*)     Blood Urea Nitrogen 25 (*)     Creatinine 0.95      Calcium 8.8      Protein Total 7.0      Albumin 4.1      Globulin 2.9      Albumin/Globulin Ratio 1.4      Bilirubin Total 0.8      ALP 46 (*)      (*)      (*)     eGFR >90      Anion Gap 5.0      BUN/Creatinine Ratio 26 (*)    TROPONIN I - Abnormal    Troponin-I 0.090 (*)    NT-PRO NATRIURETIC PEPTIDE - Abnormal    ProBNP 274.0 (*)    CBC WITH DIFFERENTIAL - Abnormal    WBC 2.60 (*)     RBC 3.68 (*)     Hgb 13.0      Hct 36.2      MCV 98.4      MCH 35.3 (*)     MCHC 35.9      RDW 14.4      Platelet 76 (*)     MPV 9.8      Neut % 80.7 (*)     Lymph % 10.8 (*)     Mono % 6.9      Eos % 0.4 (*)     Basophil % 0.0 (*)     Lymph # 0.28 (*)     Neut # 2.10      Mono # 0.18 (*)     Eos # 0.01 (*)     Baso # 0.00 (*)     Imm Gran # 0.03      Imm Grans % 1.2 (*)     NRBC% 0.8     D DIMER, QUANTITATIVE - Abnormal    D-Dimer 5.02 (*)    BLOOD GAS - Abnormal    Sample Type Arterial Blood      pH, Blood gas 7.374      pCO2, Blood gas 45.7 (*)     pO2, Blood gas 64.2 (*)     CO Hgb 1.7 (*)     Met Hgb 0.4      sO2, Blood gas 91.6 (*)     HCO3, Blood gas 25.1      Base Excess, Blood gas 0.90      FIO2, Blood gas 100.0      LPM 15.0      Oxygen Device, Blood gas NRB      IP 0.0      PIP 0     LACTIC ACID, PLASMA - Abnormal    Lactic Acid Level 2.3 (*)    TROPONIN I - Abnormal    Troponin-I 0.108 (*)    LACTIC ACID, PLASMA - Abnormal    Lactic Acid Level 2.2 (*)    RAPID INFLUENZA A/B - Normal    Influenza A Negative      Influenza B Negative     MAGNESIUM - Normal    Magnesium Level 2.10     PROTIME-INR - Normal    PT 10.0      INR 1.0      Narrative:     Protimes are used to monitor anticoagulant agents such as warfarin. PT INR values are based on the current patient normal mean and the KIRSTEN value for the specific instrument reagent used.  **Routine theraputic target values for the INR are 2.0-3.0**   SARS-COV-2 RNA AMPLIFICATION, QUAL - Normal    SARS  COV-2 Molecular Negative      Narrative:     The IDNOW COVID-19 assay is a rapid molecular in vitro diagnostic test utilizing an isothermal nucleic acid amplification technology intended for the qualitative detection of nucleic acid from the SARS-CoV-2 viral RNA in direct nasal, nasopharyngeal or throat swabs from individuals who are suspected of COVID-19 by their healthcare provider.   BLOOD CULTURE [facility]   BLOOD CULTURE [facility]   CBC W/ AUTO DIFFERENTIAL    Narrative:     The following orders were created for panel order CBC auto differential.  Procedure                               Abnormality         Status                     ---------                               -----------         ------                     CBC with Differential[[id number]]       Abnormal            Final result                 Please view results for these tests on the individual orders.   APTT   APTT   PROTIME-INR   CBC W/ AUTO DIFFERENTIAL    Narrative:     The following orders were created for panel order CBC auto differential.  Procedure                               Abnormality         Status                     ---------                               -----------         ------                     CBC with Differential[[id number]]                                                        Please view results for these tests on the individual orders.   CBC W/ AUTO DIFFERENTIAL    Narrative:     The following orders were created for panel order CBC auto differential.  Procedure                               Abnormality         Status                     ---------                               -----------         ------                     CBC with Differential[[id number]]                                                        Please view results for these tests on the individual orders.   CBC WITH DIFFERENTIAL   CBC WITH DIFFERENTIAL     EKG Readings: (Independently Interpreted)   Sinus tachycardia with occasional PVCs with heart rate of 130, OK  interval 152 QRS duration 82  milliseconds.  No ST depressions or ST elevations       Imaging Results              CT Head Without Contrast (Preliminary result)  Result time 01/21/25 02:29:28      Preliminary result by Efra Mata MD (01/21/25 02:29:28)                   Narrative:    START OF REPORT:  Technique: CT of the head was performed without intravenous contrast with axial as well as coronal and sagittal images.    Comparison: None.    Dosage Information: Automated exposure control was utilized.    Clinical history: Hx of brain cancer and sx.    Findings:  Hemorrhage: No acute intracranial hemorrhage is seen.  CSF spaces: The ventricles sulci and basal cisterns are within normal limits.  Brain parenchyma: There is a heterogenous ill defined area of hypodensity measuring 3 x 3 cm in the right temporal lobe with peripheral calcifications seen on series 8 image 26 with perilesional edema causing effacement of the adjacent sulcal spaces, compression on the right mid brain and uncal herniation. This may reflect metastases versus a neoplastic lesion.  Cerebellum: Unremarkable.  Sella and skull base: The sella appears to be within normal limits for age.  Intracranial calcifications: Incidental note is made of bilateral choroid plexus calcification. Incidental note is made of some pineal region calcification.  Calvarium: Note is made of right frontoparietal craniotomy defects with bone fixation devices in place.    Maxillofacial Structures:  Paranasal sinuses: The visualized paranasal sinuses appear clear with no mucoperiosteal thickening or air fluid levels identified.  Orbits: The orbits appear unremarkable.  Zygomatic arches: The zygomatic arches are intact and unremarkable.  Temporal bones and mastoids: The temporal bones and mastoids appear unremarkable.  TMJ: The mandibular condyles appear normally placed with respect to the mandibular fossa.      Impression:  1. There is a heterogenous ill defined  area of hypodensity measuring 3 x 3 cm in the right temporal lobe with peripheral calcifications seen on series 8 image 26 with perilesional edema causing effacement of the adjacent sulcal spaces, compression on the right mid brain and uncal herniation. This may reflect metastases versus a neoplastic lesion. Comparison with prior exams would be beneficial.  2. Details and other findings as noted above.                                         CTA Chest Non-Coronary (PE Studies) (Preliminary result)  Result time 01/21/25 01:04:14      Preliminary result by Efra Mata MD (01/21/25 01:04:14)                   Narrative:    START OF REPORT:  Technique: CT Scan of the chest was performed with intravenous contrast with direct axial images as well as sagittal and coronal reconstruction images pulmonary embolus protocol.    Dosage Information: Automated Exposure Control was utilized.    Comparison: None.    Clinical History: Sob r/o pe elevated d dimer.    Findings:  Soft Tissues: Unremarkable.  Lines and Tubes: None.  Neck: The visualized soft tissues of the neck appear unremarkable. The thyroid gland appear unremarkable.  Mediastinum: The mediastinal structures are within normal limits.  Heart: The heart appears unremarkable.  Aorta: Unremarkable appearing aorta. No aortic dissection or aneurysm is seen.  Pulmonary Arteries: There is a large pulmonary embolism with a saddle embolus component present in both main pulmonary arteries, as well as in the lobar and segmental divisions of the bilateral pulmonary arteries. There is suggestion of some mild flattening of the intraventricular septum which could reflect an element of right heart strain.  Lungs: There is mild non specific dependent change at the lung bases. There are areas of consolidation in the right middle lobe, which may indicate an infarct.  Pleura: No effusions or pneumothorax are identified.  Bony Structures:  Spine: Mild multilevel spondylolytic changes are  seen in the thoracic spine.  Ribs: No rib fractures are identified. The bilateral ribs appear unremarkable.  Abdomen: Moderate hepatic steatosis is observed. The rest of the visualized upper abdominal organs appear unremarkable.    Notifications: The results were discussed with the emergency room physician prior to dictation at 2025-01-21 01:03:38 CST.      Impression:  1. There are areas of consolidation in the right middle lobe, which may indicate an infarct. Correlate with clinical findings as regards additional evaluation and follow-up.  2. There is a large pulmonary embolism with a saddle embolus component present in both main pulmonary arteries, as well as in the lobar and segmental divisions of the bilateral pulmonary arteries. There is suggestion of some mild flattening of the intraventricular septum which could reflect an element of right heart strain. Correlate with clinical findings as regards additional evaluation and follow-up.  3. Details and other findings as discussed above.                                         X-Ray Chest 1 View (Final result)  Result time 01/20/25 22:23:32      Final result by Monroe Cabrera MD (01/20/25 22:23:32)                   Impression:      No acute disease is demonstrated.    Limited study      Electronically signed by: Monroe Cabrera MD  Date:    01/20/2025  Time:    22:23               Narrative:    EXAMINATION:  XR CHEST 1 VIEW    CLINICAL HISTORY:  Shortness of breath    TECHNIQUE:  Single frontal view of the chest was performed.    COMPARISON:  None    FINDINGS:  No infiltrates are seen.  Heart size is within normal limits.  Costophrenic angles are clear.                                    X-Rays:   Independently Interpreted Readings:   Other Readings:  X-ray chest shows no acute cardiopulmonary process.    CT angio chest shows:      Impression:  1. There are areas of consolidation in the right middle lobe, which may indicate an infarct. Correlate with clinical  findings as regards additional evaluation and follow-up.  2. There is a large pulmonary embolism with a saddle embolus component present in both main pulmonary arteries, as well as in the lobar and segmental divisions of the bilateral pulmonary arteries. There is suggestion of some mild flattening of the intraventricular septum which could reflect an element of right heart strain. Correlate with clinical findings as regards additional evaluation and follow-up.  3. Details and other findings as discussed above.         Medications   heparin 25,000 units in dextrose 5% 250 mL (100 units/mL) infusion HIGH INTENSITY nomogram - [facility] (18 Units/kg/hr × 119.4 kg (Adjusted) Intravenous New Bag 1/21/25 0339)   heparin 25,000 units in dextrose 5% (100 units/ml) IV bolus from bag HIGH INTENSITY NOMOGRAM - [facility] (has no administration in time range)   heparin 25,000 units in dextrose 5% (100 units/ml) IV bolus from bag HIGH INTENSITY NOMOGRAM - [facility] (has no administration in time range)   albuterol-ipratropium 2.5 mg-0.5 mg/3 mL nebulizer solution 3 mL (3 mLs Nebulization Given 1/20/25 2211)   budesonide nebulizer solution 0.5 mg (0.5 mg Nebulization Given 1/20/25 2212)   methylPREDNISolone sodium succinate injection 125 mg (125 mg Intravenous Given 1/20/25 2210)   piperacillin-tazobactam (ZOSYN) 4.5 g in D5W 100 mL IVPB (MB+) (0 g Intravenous Stopped 1/20/25 2340)   iohexoL (OMNIPAQUE 350) injection 100 mL (100 mLs Intravenous Given 1/21/25 0025)   heparin 25,000 units in dextrose 5% (100 units/ml) IV bolus from bag HIGH INTENSITY NOMOGRAM - [facility] (9,552 Units Intravenous Bolus from Bag 1/21/25 0330)     Medical Decision Making  34-year-old male patient came into the emergency room with history of having shortness of breath for the last 2 hours prior to arrival.  Patient is in respiratory distress on arrival.  However he denies chest pain or palpitations.  Speaking in full sentences.  Patient does have underlying history of brain  cancer/astrocytoma stage III and he had craniotomy and chemotherapy.  Currently patient is on do not intubate code status.  Patient states he can get chest compressions if needed but does not want to get intubated or put on ventilator.  This has been confirmed by the patient and his mother at the bedside who is the power of .    Patient's 1st set of troponin is elevated.  Has elevated D-dimer.  Patient's platelet count is only 76.  I did not do any aspirin since patient has thrombocytopenia.  However I have ordered CT angio chest to rule out pulmonary embolus.  For the 2nd set of troponin.  Patient has leukopenia and elevated lactic acid.  Given Zosyn.  Not do IV fluids due to risk of fluid overload with this patient who has shortness of breath.    Patient is currently on BiPAP.    CT angio chest shows:   Impression:  1. There are areas of consolidation in the right middle lobe, which may indicate an infarct. Correlate with clinical findings as regards additional evaluation and follow-up.  2. There is a large pulmonary embolism with a saddle embolus component present in both main pulmonary arteries, as well as in the lobar and segmental divisions of the bilateral pulmonary arteries. There is suggestion of some mild flattening of the intraventricular septum which could reflect an element of right heart strain. Correlate with clinical findings as regards additional evaluation and follow-up.  3. Details and other findings as discussed above.     Initiate the transfer process for this patient.  I could not do any anticoagulation due to thrombocytopenia with platelet count of 76.    Differential diagnosis: 1. CHF exacerbation 2.  Pulmonary embolus 3. Pneumonia 4. COPD 5. Sepsis 6. Underlying astrocytoma metastasis  7 thrombocytopenia    At approximately 2:00 a.m. today January 21, 2025 me and one of the staff nurses have detailed discussion with the patient and patient's mother who is power of  and who is  at the bedside about the patient's code status.  Patient and patient's mother finally decided to go with do not resuscitate (DNR) code status even though they were only do not intubate code (DNI) status before this.  Now they understand that they do not want any chest compressions or intubation or ventilator placement if patient is in cardiopulmonary arrest.  Therefore from now we consider this patient as under do not resuscitate (DNR) code status.  This was witnessed by the staff nurse in the emergency room in front of me.    Also I have discussed that patient has saddle pulmonary embolus with right heart strain, tachycardia, elevated troponin which could put the patient at life risk and the need of anticoagulant.  Also notified that patient has thrombocytopenia which can worsen with heparin and could  put the patient at bleeding risk and also death from that point of view.  Explained the risks and benefits of anticoagulation in detail to both patient and patient's mother at the bedside and finally they have agreed to go with anticoagulation of heparin.  I explained the risks of severe bleeding including intracranial bleed and death from heparin infusion to both patient and the mother at the bedside.  They agreed the risk and want to get anticoagulation done.    I also have notified [facility] cardiologist for evaluation in the risks and benefits of heparin drip on this patient with saddle embolus.    Ordered CT scan of the head to rule out brain bleed in the patient with astrocytoma who had brain surgery recently.    Differential diagnosis: 1. CHF exacerbation 2.  Pulmonary embolus 3. Pneumonia 4. COPD 5. Sepsis 6. Underlying astrocytoma metastasis  7 thrombocytopenia    CT head shows:Impression:  1. There is a heterogenous ill defined area of hypodensity measuring 3 x 3 cm in the right temporal lobe with peripheral calcifications seen on series 8 image 26 with perilesional edema causing effacement of the adjacent sulcal  spaces, compression on the right mid brain and uncal herniation. This may reflect metastases versus a neoplastic lesion. Comparison with prior exams would be beneficial.  2. Details and other findings as noted above.      [facility]/cardiologist has evaluated the patient and discussed the benefits and risks with the patient about starting the heparin in this patient with thrombocytopenia and recent brain surgery for astrocytoma and a decision to start heparin bolus followed by heparin drip has been made since patient has saddle embolus with right heart strain and elevated troponin.  As recommended by [facility] and also the accepting physician Dr. Howard at [facility] I am starting this patient on heparin.      Patient has been accepted for transfer at [facility] by Dr. Howard for higher level care.      At 6.00 AM patient care transferred to Dr. Welsh    Amount and/or Complexity of Data Reviewed  Labs: ordered.  Radiology: ordered.    Risk  Prescription drug management.                                      Clinical Impression:  Final diagnoses:  [R07.9] Chest pain  [R06.02] Shortness of breath  [I26.99] Pulmonary embolus and infarction (Primary)  [I26.02] Saddle embolus of pulmonary artery with acute cor pulmonale, unspecified chronicity  [D69.6] Thrombocytopenia  [C71.9] Astrocytoma brain tumor          ED Disposition Condition    Transfer to Another Facility Stable                Dieter Abernathy, DO  01/21/25 0310       Dieter Abernathy, DO  01/21/25 0315       Dieter Abernathy, DO  01/21/25 0323       Dieter Abernathy, DO  01/21/25 0327       Dieter Abernathy, DO  01/22/25 0020

## 2025-01-21 NOTE — SUBJECTIVE & OBJECTIVE
Past Medical History:   Diagnosis Date    Anxiety     Bipolar disorder 03/04/2022    Brain mass     Depression     Fatty liver     High blood sugar     Insomnia     Posttraumatic stress disorder 03/19/2019    Seizures     Sleep apnea        Past Surgical History:   Procedure Laterality Date    CRANIOTOMY, WITH NEOPLASM EXCISION USING COMPUTER-ASSISTED NAVIGATION Right 10/15/2024    Procedure: CRANIOTOMY, WITH NEOPLASM EXCISION USING COMPUTER-ASSISTED NAVIGATION;  Surgeon: Cole Smith MD;  Location: [facility] OR;  Service: Neurosurgery;  Laterality: Right;  right temporal crani for excision of tumor  Stealth // ULTRASOUND // TIVA SETUP  NTI (meps, sseps, eeg, surbcortical stim - need grid)       Review of patient's allergies indicates:   Allergen Reactions    Tree nut        No current facility-administered medications on file prior to encounter.     Current Outpatient Medications on File Prior to Encounter   Medication Sig    dexAMETHasone (DECADRON) 2 MG tablet Take 3 tablets by mouth every morning.    DULoxetine (CYMBALTA) 20 MG capsule Take 20 mg by mouth once daily.    DULoxetine (CYMBALTA) 30 MG capsule TAKE ONE CAPSULE BY MOUTH EVERY DAY with 60mg    DULoxetine (CYMBALTA) 60 MG capsule Take 60 mg by mouth once daily. Take with 30mg capsule    insulin glargine U-100, Lantus, 100 unit/mL injection Inject 20 Units into the skin once daily.    JARDIANCE 25 mg tablet Take 1 tablet by mouth once daily.    levETIRAcetam (KEPPRA) 750 MG Tab Take 2 tablets (1,500 mg total) by mouth 2 (two) times daily.    LINZESS 72 mcg Cap capsule Take 1 capsule every day by oral route for 30 days.    lisdexamfetamine (VYVANSE) 50 MG capsule TAKE ONE CAPSULE BY MOUTH ONCE EVERY DAY    lurasidone (LATUDA) 40 mg Tab tablet Take 40 mg by mouth once daily.    metFORMIN (GLUCOPHAGE) 850 MG tablet Take 1 tablet (850 mg total) by mouth 2 (two) times daily with meals.    MYRBETRIQ 25 mg Tb24 ER tablet Take 25 mg by mouth once daily.     QUEtiapine (SEROQUEL) 100 MG Tab Take 200 mg by mouth every evening.    sulfamethoxazole-trimethoprim 800-160mg (BACTRIM DS) 800-160 mg Tab Take 1 tablet by mouth once daily.    TRUE METRIX GLUCOSE METER Misc USE TO check glucose BEFORE meals AND AT BEDTIME    TRUE METRIX GLUCOSE TEST STRIP Strp 4 (four) times daily.    TRUEPLUS LANCETS 30 gauge Misc USE TO check glucose BEFORE meals AND AT BEDTIME    albuterol (PROVENTIL/VENTOLIN HFA) 90 mcg/actuation inhaler inhale TWO puffs EVERY 4 TO 6 HOURS AS NEEDED SHORTNESS OF BREATH wheezing    aluminum & magnesium hydroxide-simethicone (MYLANTA MAX STRENGTH) 400-400-40 mg/5 mL suspension Take 5 mLs by mouth 4 (four) times daily as needed (mouth sores).    busPIRone (BUSPAR) 15 MG tablet Take 1 tablet by mouth 3 (three) times daily.    diphenhydrAMINE (BENADRYL) 12.5 mg/5 mL elixir Take 5 mLs (12.5 mg total) by mouth 4 (four) times daily as needed (mouth sores).    guanFACINE (INTUNIV ER) 2 mg Tb24 Take 1 tablet by mouth once daily.    HYDROcodone-acetaminophen (NORCO) 5-325 mg per tablet Take 1 tablet by mouth every 4 (four) hours as needed.    hydrOXYzine pamoate (VISTARIL) 50 MG Cap Take 50 mg by mouth every 8 (eight) hours as needed. Take one to two capsules by mouth three times daily as needed for anxiety.    ibuprofen (ADVIL,MOTRIN) 800 MG tablet Take 800 mg by mouth 3 (three) times daily.    LIDOcaine viscous HCl 2% (LIDOCAINE VISCOUS) 2 % Soln by Mucous Membrane route 4 (four) times daily as needed (mouth sores). Swish and spit 20 ml (5 ml benadryl, 5 ml lidocaine, 5 ml mylanta, 5 ml nystatin) four times daily as needed for mouth sores    lisinopriL-hydrochlorothiazide (PRINZIDE,ZESTORETIC) 20-12.5 mg per tablet Take 1 tablet by mouth once daily.    nebivoloL (BYSTOLIC) 5 MG Tab Take 1 tablet by mouth once daily.    nystatin (MYCOSTATIN) 100,000 unit/mL suspension Take 5 mL 4 times a day by oral route, for thrush.    ondansetron (ZOFRAN-ODT) 4 MG TbDL Take 2 tablets  (8 mg total) by mouth every 6 (six) hours as needed (nausea/vomiting).    XANAX 0.5 mg tablet Take 1 tablet 3 times a day by oral route as needed, for Anxiety.     Family History       Problem Relation (Age of Onset)    Heart disease Father    Mental illness Mother, Father          Tobacco Use    Smoking status: Every Day     Current packs/day: 0.00     Types: Vaping with nicotine, Cigarettes     Last attempt to quit: 10/2024     Years since quittin.3    Smokeless tobacco: Never   Substance and Sexual Activity    Alcohol use: Yes     Comment: beer occ    Drug use: Yes     Types: Marijuana     Comment: medical marijuana    Sexual activity: Not on file     Review of Systems   Constitutional:  Negative for appetite change, fatigue and fever.   Respiratory:  Positive for chest tightness and shortness of breath. Negative for cough and wheezing.    Cardiovascular:  Negative for chest pain and leg swelling.   Gastrointestinal:  Negative for abdominal distention, abdominal pain, constipation, diarrhea, nausea and vomiting.   Skin:  Negative for color change, pallor, rash and wound.   Neurological:  Negative for tremors, syncope and headaches.   Psychiatric/Behavioral:  Negative for agitation and behavioral problems.      Objective:     Vital Signs (Most Recent):  Temp: 98.4 °F (36.9 °C) (25 1328)  Pulse: 105 (25 1328)  Resp: 20 (25 1328)  BP: 116/85 (25 1328)  SpO2: 95 % (25 1328) Vital Signs (24h Range):  Temp:  [96 °F (35.6 °C)-98.4 °F (36.9 °C)] 98.4 °F (36.9 °C)  Pulse:  [] 105  Resp:  [12-37] 20  SpO2:  [91 %-100 %] 95 %  BP: ()/() 116/85     Weight: (!) 174.7 kg (385 lb 2.3 oz)  Body mass index is 53.72 kg/m².     Physical Exam  Vitals and nursing note reviewed. Exam conducted with a chaperone present.   Constitutional:       General: He is not in acute distress.     Appearance: Normal appearance. He is obese. He is not ill-appearing.   HENT:      Head: Normocephalic  and atraumatic.      Nose: Nose normal.   Eyes:      General: No scleral icterus.     Conjunctiva/sclera: Conjunctivae normal.   Cardiovascular:      Rate and Rhythm: Normal rate and regular rhythm.      Pulses: Normal pulses.      Heart sounds: Normal heart sounds.   Pulmonary:      Effort: Pulmonary effort is normal.      Breath sounds: Normal breath sounds.   Abdominal:      General: Abdomen is flat. Bowel sounds are normal.      Palpations: Abdomen is soft.   Musculoskeletal:      Right lower leg: No edema.      Left lower leg: No edema.   Skin:     General: Skin is warm and dry.      Findings: No erythema or rash.   Neurological:      General: No focal deficit present.      Mental Status: He is alert and oriented to person, place, and time.   Psychiatric:         Mood and Affect: Mood normal.         Behavior: Behavior normal.         Thought Content: Thought content normal.                Significant Labs: All pertinent labs within the past 24 hours have been reviewed.  CBC:   Recent Labs   Lab 01/20/25  2203 01/21/25  0801   WBC 2.60* 4.13   HGB 13.0 12.4*   HCT 36.2 34.9*   PLT 76* 59*     CMP:   Recent Labs   Lab 01/20/25  2203   *   K 4.4   CL 98   CO2 27   BUN 25*   CREATININE 0.95   CALCIUM 8.8   ALBUMIN 4.1   BILITOT 0.8   ALKPHOS 46*   *   *       Significant Imaging: I have reviewed all pertinent imaging results/findings within the past 24 hours.

## 2025-01-22 VITALS
HEART RATE: 103 BPM | DIASTOLIC BLOOD PRESSURE: 56 MMHG | WEIGHT: 315 LBS | TEMPERATURE: 99 F | RESPIRATION RATE: 20 BRPM | OXYGEN SATURATION: 98 % | SYSTOLIC BLOOD PRESSURE: 92 MMHG | HEIGHT: 71 IN | BODY MASS INDEX: 44.1 KG/M2

## 2025-01-22 LAB
APTT PPP: 55.9 SECONDS
BASOPHILS # BLD AUTO: 0.01 X10(3)/MCL (ref 0.01–0.08)
BASOPHILS NFR BLD AUTO: 0.3 % (ref 0.1–1.2)
EOSINOPHIL # BLD AUTO: 0 X10(3)/MCL (ref 0.04–0.54)
EOSINOPHIL NFR BLD AUTO: 0 % (ref 0.7–7)
ERYTHROCYTE [DISTWIDTH] IN BLOOD BY AUTOMATED COUNT: 14.5 %
HCT VFR BLD AUTO: 34.7 % (ref 36–52)
HGB BLD-MCNC: 12.3 G/DL (ref 13–18)
IMM GRANULOCYTES # BLD AUTO: 0.03 X10(3)/MCL (ref 0–0.03)
IMM GRANULOCYTES NFR BLD AUTO: 0.8 % (ref 0–0.5)
LYMPHOCYTES # BLD AUTO: 0.33 X10(3)/MCL (ref 1.32–3.57)
LYMPHOCYTES NFR BLD AUTO: 8.6 % (ref 20–55)
MCH RBC QN AUTO: 35.3 PG (ref 27–34)
MCHC RBC AUTO-ENTMCNC: 35.4 G/DL (ref 31–37)
MCV RBC AUTO: 99.7 FL (ref 79–99)
MONOCYTES # BLD AUTO: 0.17 X10(3)/MCL (ref 0.3–0.82)
MONOCYTES NFR BLD AUTO: 4.4 % (ref 4.7–12.5)
NEUTROPHILS # BLD AUTO: 3.31 X10(3)/MCL (ref 1.78–5.38)
NEUTROPHILS NFR BLD AUTO: 85.9 % (ref 37–73)
NRBC BLD AUTO-RTO: 1.3 %
PLATELET # BLD AUTO: 68 X10(3)/MCL (ref 140–371)
PMV BLD AUTO: 9.5 FL (ref 9.4–12.4)
POCT GLUCOSE: 192 MG/DL (ref 70–110)
POCT GLUCOSE: 359 MG/DL (ref 70–110)
RBC # BLD AUTO: 3.48 X10(6)/MCL (ref 4–6)
WBC # BLD AUTO: 3.85 X10(3)/MCL (ref 4–11.5)

## 2025-01-22 PROCEDURE — 63600175 PHARM REV CODE 636 W HCPCS: Performed by: INTERNAL MEDICINE

## 2025-01-22 PROCEDURE — 25000003 PHARM REV CODE 250: Performed by: EMERGENCY MEDICINE

## 2025-01-22 PROCEDURE — 99900035 HC TECH TIME PER 15 MIN (STAT)

## 2025-01-22 PROCEDURE — 85730 THROMBOPLASTIN TIME PARTIAL: CPT | Performed by: FAMILY MEDICINE

## 2025-01-22 PROCEDURE — 85025 COMPLETE CBC W/AUTO DIFF WBC: CPT | Performed by: INTERNAL MEDICINE

## 2025-01-22 PROCEDURE — 94660 CPAP INITIATION&MGMT: CPT

## 2025-01-22 PROCEDURE — 27000221 HC OXYGEN, UP TO 24 HOURS

## 2025-01-22 PROCEDURE — 94761 N-INVAS EAR/PLS OXIMETRY MLT: CPT

## 2025-01-22 PROCEDURE — 36415 COLL VENOUS BLD VENIPUNCTURE: CPT | Performed by: INTERNAL MEDICINE

## 2025-01-22 PROCEDURE — 63600175 PHARM REV CODE 636 W HCPCS: Performed by: FAMILY MEDICINE

## 2025-01-22 PROCEDURE — 25000003 PHARM REV CODE 250: Performed by: FAMILY MEDICINE

## 2025-01-22 RX ORDER — ALPRAZOLAM 0.5 MG/1
0.5 TABLET ORAL 3 TIMES DAILY PRN
Qty: 20 TABLET | Refills: 0 | Status: SHIPPED | OUTPATIENT
Start: 2025-01-22 | End: 2025-01-29

## 2025-01-22 RX ADMIN — NYSTATIN 500000 UNITS: 100000 SUSPENSION ORAL at 12:01

## 2025-01-22 RX ADMIN — LISINOPRIL: 10 TABLET ORAL at 08:01

## 2025-01-22 RX ADMIN — HEPARIN SODIUM 16 UNITS/KG/HR: 10000 INJECTION, SOLUTION INTRAVENOUS at 02:01

## 2025-01-22 RX ADMIN — NYSTATIN 500000 UNITS: 100000 SUSPENSION ORAL at 08:01

## 2025-01-22 RX ADMIN — INSULIN ASPART 5 UNITS: 100 INJECTION, SOLUTION INTRAVENOUS; SUBCUTANEOUS at 10:01

## 2025-01-22 RX ADMIN — ENOXAPARIN SODIUM 170 MG: 120 INJECTION SUBCUTANEOUS at 02:01

## 2025-01-22 RX ADMIN — EMPAGLIFLOZIN 25 MG: 25 TABLET, FILM COATED ORAL at 08:01

## 2025-01-22 RX ADMIN — METOPROLOL TARTRATE 25 MG: 25 TABLET, FILM COATED ORAL at 08:01

## 2025-01-22 RX ADMIN — DULOXETINE HYDROCHLORIDE 60 MG: 30 CAPSULE, DELAYED RELEASE ORAL at 08:01

## 2025-01-22 RX ADMIN — DULOXETINE HYDROCHLORIDE 30 MG: 30 CAPSULE, DELAYED RELEASE ORAL at 08:01

## 2025-01-22 RX ADMIN — LEVETIRACETAM 1500 MG: 500 TABLET, FILM COATED ORAL at 08:01

## 2025-01-22 RX ADMIN — BUSPIRONE HYDROCHLORIDE 15 MG: 15 TABLET ORAL at 08:01

## 2025-01-22 RX ADMIN — ALPRAZOLAM 0.5 MG: 0.5 TABLET ORAL at 08:01

## 2025-01-22 NOTE — CONSULTS
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  Patient admitted to the hospital yesterday with shortness of breath and after evaluation revealed he had saddle pulmonary embolus and significant bilateral pulmonary embolism clot load.  Due to the cerebral malignancy he was not a candidate for tPA.  Hemodynamically he has been stable and he is oxygenating well.  This morning he denies chest pain and has only mild shortness of breath.  He is saturating at 100% on 2 L nasal cannula.      Patient has a terminal illness and is anxious to go home with hospice.    Past Medical History:   Diagnosis Date    Anxiety     Bipolar disorder 03/04/2022    Brain mass     Depression     Fatty liver     High blood sugar     Insomnia     Posttraumatic stress disorder 03/19/2019    Seizures     Sleep apnea        Past Surgical History:   Procedure Laterality Date    CRANIOTOMY, WITH NEOPLASM EXCISION USING COMPUTER-ASSISTED NAVIGATION Right 10/15/2024    Procedure: CRANIOTOMY, WITH NEOPLASM EXCISION USING COMPUTER-ASSISTED NAVIGATION;  Surgeon: Cole Smith MD;  Location: [facility];  Service: Neurosurgery;  Laterality: Right;  right temporal crani for excision of tumor  Stealth // ULTRASOUND // TIVA SETUP  NTI (meps, sseps, eeg, surbcortical stim - need grid)       Review of patient's allergies indicates:   Allergen Reactions    Tree nut        Family History       Problem Relation (Age of Onset)    Heart disease Father    Mental illness Mother, Father          Tobacco Use    Smoking status: Every Day     Current packs/day: 0.00     Types:  Vaping with nicotine, Cigarettes     Last attempt to quit: 10/2024     Years since quittin.3    Smokeless tobacco: Never   Substance and Sexual Activity    Alcohol use: Yes     Comment: beer occ    Drug use: Yes     Types: Marijuana     Comment: medical marijuana    Sexual activity: Not on file        Review of Systems  Objective:     Vital Signs (Most Recent):  Temp: 98.6 °F (37 °C) (25 1117)  Pulse: 103 (25 1117)  Resp: 20 (25 1117)  BP: (!) 92/56 (25 1117)  SpO2: 100 % (25 1117) Vital Signs (24h Range):  Temp:  [97.5 °F (36.4 °C)-98.6 °F (37 °C)] 98.6 °F (37 °C)  Pulse:  [] 103  Resp:  [20-25] 20  SpO2:  [92 %-100 %] 100 %  BP: ()/(56-92) 92/56     Body mass index is 53.72 kg/m².      Intake/Output Summary (Last 24 hours) at 2025 1154  Last data filed at 2025 0400  Gross per 24 hour   Intake 480 ml   Output 2075 ml   Net -1595 ml       Physical Exam  Telemedicine visit  Vents:  Oxygen Concentration (%): 28 (25 0440)    Lines/Drains/Airways       Drain  Duration             Male External Urinary Catheter 25 2130 <1 day              Peripheral Intravenous Line  Duration                  Peripheral IV - Single Lumen 25 18 G Anterior;Distal;Right Upper Arm 2 days         Peripheral IV - Single Lumen 25 2300 20 G Yes Posterior;Right Hand 1 day                    Significant Labs:    Lab Results   Component Value Date    WBC 3.85 (L) 2025    HGB 12.3 (L) 2025    HCT 34.7 (L) 2025    MCV 99.7 (H) 2025    PLT 68 (L) 2025         BMP  Lab Results   Component Value Date     (L) 2025    K 4.4 2025    CL 98 2025    CO2 27 2025    BUN 25 (H) 2025    CREATININE 0.95 2025    CALCIUM 8.8 2025    ESTGFRAFRICA 104 2022    EGFRNONAA 89 2022       ABG  Recent Labs   Lab 25  2201   PH 7.374   PO2 64.2*   PCO2 45.7*   HCO3 25.1         All pertinent labs within  the past 24 hours have been reviewed.    Significant Imaging:   I have reviewed all pertinent imaging results/findings within the past 24 hours.  I have reviewed and interpreted all pertinent imaging results/findings within the past 24 hours.  Chest x-ray and CT scans were reviewed revealing saddle pulmonary embolus and significant clot burden bilaterally in distal pulmonary arteries.  Assessment/Plan:     Saddle pulmonary embolus (patient hemodynamically stable with adequate oxygenation  Astrocytoma currently followed by Dr. Lejeune.  Plan:  Patient would like to go home ASA.  I would suggest transitioning him to [facility] tomorrow and then if stable could potentially go home Friday with hospice.  Patient will need to be on lifelong anticoagulation due to underlying malignancy diagnosis.      Thank you for your consult. I will sign off. Please contact us if you have any additional questions.     Mk Olivier MD  Pulmonology  [facility]-Med/S[facility]

## 2025-01-22 NOTE — NURSING
Discharged to home via wc accompanied by staff and family to personal vehicle. No distress noted.

## 2025-01-22 NOTE — NURSING
PT INSISTS ON GETTING OOB. STATES HE FEELS CLAUSTROPHOBIC AND WANTS TO JUST STAND AT BEDSIDE. STOOD AT BEDSIDE W/WALKER TO STEADY PT. SAT BRIEFLY ON BEDSIDE CHAIR. GAIT FAIRLY STEADY. RETURNED TO BED AFTER TAKING AT FEW STEPS AT BEDSIDE. MOTHER AT SIDE. CB NEAR. SR UP X4.

## 2025-01-22 NOTE — PLAN OF CARE
Problem: Adult Inpatient Plan of Care  Goal: Plan of Care Review  Outcome: Progressing  Goal: Patient-Specific Goal (Individualized)  Outcome: Progressing  Goal: Absence of Hospital-Acquired Illness or Injury  Outcome: Progressing  Goal: Optimal Comfort and Wellbeing  Outcome: Progressing  Goal: Readiness for Transition of Care  Outcome: Progressing     Problem: Bariatric Environmental Safety  Goal: Safety Maintained with Care  Outcome: Progressing     Problem: Wound  Goal: Optimal Coping  Outcome: Progressing  Goal: Optimal Functional Ability  Outcome: Progressing  Goal: Absence of Infection Signs and Symptoms  Outcome: Progressing  Goal: Improved Oral Intake  Outcome: Progressing  Goal: Optimal Pain Control and Function  Outcome: Progressing  Goal: Skin Health and Integrity  Outcome: Progressing  Goal: Optimal Wound Healing  Outcome: Progressing     Problem: Fall Injury Risk  Goal: Absence of Fall and Fall-Related Injury  Outcome: Progressing     Problem: Fatigue  Goal: Improved Activity Tolerance  Outcome: Progressing     Problem: Sensory Impairment  Goal: Optimal Sensory Management  Outcome: Progressing     Problem: Anxiety  Goal: Anxiety Reduction or Resolution  Outcome: Progressing     Problem: Adult Inpatient Plan of Care  Goal: Plan of Care Review  Outcome: Progressing  Goal: Patient-Specific Goal (Individualized)  Outcome: Progressing  Goal: Absence of Hospital-Acquired Illness or Injury  Outcome: Progressing  Goal: Optimal Comfort and Wellbeing  Outcome: Progressing  Goal: Readiness for Transition of Care  Outcome: Progressing     Problem: Bariatric Environmental Safety  Goal: Safety Maintained with Care  Outcome: Progressing     Problem: Wound  Goal: Optimal Coping  Outcome: Progressing  Goal: Optimal Functional Ability  Outcome: Progressing  Goal: Absence of Infection Signs and Symptoms  Outcome: Progressing  Goal: Improved Oral Intake  Outcome: Progressing  Goal: Optimal Pain Control and  Function  Outcome: Progressing  Goal: Skin Health and Integrity  Outcome: Progressing  Goal: Optimal Wound Healing  Outcome: Progressing     Problem: Fall Injury Risk  Goal: Absence of Fall and Fall-Related Injury  Outcome: Progressing     Problem: Fatigue  Goal: Improved Activity Tolerance  Outcome: Progressing     Problem: Sensory Impairment  Goal: Optimal Sensory Management  Outcome: Progressing     Problem: Anxiety  Goal: Anxiety Reduction or Resolution  Outcome: Progressing

## 2025-01-22 NOTE — PLAN OF CARE
01/22/25 1507   Final Note   Assessment Type Final Discharge Note   Anticipated Discharge Disposition HospiceHome  ([facility])   What phone number can be called within the next 1-3 days to see how you are doing after discharge? [phone]   Post-Acute Status   Post-Acute Authorization Hospice   Hospice Status Set-up Complete/Auth obtained   Coverage [facility] Community Plan   Discharge Delays None known at this time

## 2025-01-22 NOTE — PLAN OF CARE
01/22/25 1500   Discharge Assessment   Assessment Type Discharge Planning Assessment   Source of Information health record   When was your last doctors appointment? 01/02/25   Communicated SELVIN with patient/caregiver Yes   Reason For Admission Saddle Pulmonary Embolus   People in Home parent(s)   Facility Arrived From: Home   Do you expect to return to your current living situation? Yes   Do you have help at home or someone to help you manage your care at home? Yes   Who are your caregiver(s) and their phone number(s)? Tena Gomes-Mother  [phone]   Prior to hospitilization cognitive status: Alert/Oriented   Current cognitive status: Alert/Oriented   Walking or Climbing Stairs Difficulty no   Dressing/Bathing Difficulty no   Equipment Currently Used at Home none   Do you take prescription medications? Yes   Do you have prescription coverage? Yes   Coverage [facility] Community Plan   Do you have any problems affording any of your prescribed medications? No   Is the patient taking medications as prescribed? yes   Who is going to help you get home at discharge? Parents   How do you get to doctors appointments? family or friend will provide   Are you on dialysis? No   Do you take coumadin? No   Discharge Plan A Hospice/home   DME Needed Upon Discharge  none   Discharge Plan discussed with: Patient;Parent(s)   Transition of Care Barriers None   OTHER   Name(s) of People in Home Tena Gomes

## 2025-01-22 NOTE — PLAN OF CARE
Problem: Adult Inpatient Plan of Care  Goal: Plan of Care Review  1/22/2025 1402 by Evangelina Navarro RN  Outcome: Met  1/22/2025 1147 by Evangelina Navarro RN  Outcome: Progressing  Goal: Patient-Specific Goal (Individualized)  1/22/2025 1402 by Evangelina Navarro RN  Outcome: Met  1/22/2025 1147 by Evangelina Navarro RN  Outcome: Progressing  Goal: Absence of Hospital-Acquired Illness or Injury  1/22/2025 1402 by Evangelina Navarro RN  Outcome: Met  1/22/2025 1147 by Evangelina Navarro RN  Outcome: Progressing  Goal: Optimal Comfort and Wellbeing  1/22/2025 1402 by Evangelina Navarro RN  Outcome: Met  1/22/2025 1147 by Evangelina Navarro RN  Outcome: Progressing  Goal: Readiness for Transition of Care  1/22/2025 1402 by Evangelina Navarro RN  Outcome: Met  1/22/2025 1147 by Evangelina Navarro RN  Outcome: Progressing
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
## 2025-01-22 NOTE — PLAN OF CARE
Problem: Adult Inpatient Plan of Care  Goal: Plan of Care Review  Outcome: Progressing  Goal: Patient-Specific Goal (Individualized)  Outcome: Progressing  Goal: Absence of Hospital-Acquired Illness or Injury  Outcome: Progressing  Goal: Optimal Comfort and Wellbeing  Outcome: Progressing  Goal: Readiness for Transition of Care  Outcome: Progressing     Problem: Bariatric Environmental Safety  Goal: Safety Maintained with Care  Outcome: Progressing     Problem: Wound  Goal: Optimal Coping  Outcome: Progressing  Goal: Optimal Functional Ability  Outcome: Progressing  Goal: Absence of Infection Signs and Symptoms  Outcome: Progressing  Goal: Improved Oral Intake  Outcome: Progressing  Goal: Optimal Pain Control and Function  Outcome: Progressing  Goal: Skin Health and Integrity  Outcome: Progressing  Goal: Optimal Wound Healing  Outcome: Progressing     Problem: Sensory Impairment  Goal: Optimal Sensory Management  Outcome: Progressing     Problem: Fatigue  Goal: Improved Activity Tolerance  Outcome: Progressing     Problem: Anxiety  Goal: Anxiety Reduction or Resolution  Outcome: Progressing

## 2025-01-22 NOTE — PLAN OF CARE
[facility] contacted , office closed d/t weather , informed by answering service the office will be open tomorrow . Pt's info faxed.

## 2025-01-22 NOTE — HOSPITAL COURSE
01/22/2025 DISCHARGE SUMMARY: pt admitted with saddle PE.  Pt with end stage brain cancer no longer receiving chemo or XRT.  Pt intiially did not desire admission, but agreed after discussion with his mom.  He has tolerated heparin drip, but wants to go home with hospice to assume care tomorrow am.  We will give him a one time dose of lovenox and he will be d/c terrance on eliquis 10mg bid x 7 d then 5mg bid after that.  I filled his xanax also to be filled tomorrow am.

## 2025-01-24 ENCOUNTER — TELEPHONE (OUTPATIENT)
Dept: NEUROSURGERY | Facility: CLINIC | Age: 35
End: 2025-01-24
Payer: MEDICAID

## 2025-01-24 LAB
OHS QRS DURATION: 82 MS
OHS QTC CALCULATION: 420 MS

## 2025-01-24 NOTE — TELEPHONE ENCOUNTER
I called and rescheduled patient appointment with Azul NP. Patient asked can appointment be virtual again. I rescheduled patient virtual appointment on 1/27/25 at 10:30 with Azul NP. Patient verbalized understanding of appointment date and time.

## 2025-01-25 LAB
BACTERIA BLD CULT: NORMAL
BACTERIA BLD CULT: NORMAL

## 2025-01-27 ENCOUNTER — OFFICE VISIT (OUTPATIENT)
Dept: NEUROSURGERY | Facility: CLINIC | Age: 35
End: 2025-01-27
Payer: MEDICAID

## 2025-01-27 VITALS — BODY MASS INDEX: 44.1 KG/M2 | HEIGHT: 71 IN | WEIGHT: 315 LBS

## 2025-01-27 DIAGNOSIS — C71.9 ASTROCYTOMA BRAIN TUMOR: Primary | ICD-10-CM

## 2025-01-27 RX ORDER — SEMAGLUTIDE 0.68 MG/ML
INJECTION, SOLUTION SUBCUTANEOUS
COMMUNITY
Start: 2025-01-10 | End: 2025-01-30 | Stop reason: ALTCHOICE

## 2025-01-27 NOTE — PROGRESS NOTES
[facility]  History & Physical  Neurosurgery      Luis Gomes   [id number]   1990         This is a telemedicine note.  The patient was treated using telemedicine with real-time audio and video, according to [facility] protocols.  I, a distant provider, conducted the visit from my office.  The patient participated in the visit at their home in [place].  I am licensed in the state where the patient stated they are located.  The patient (or patient's representative) stated that they understood and accepted the privacy and security risks today information other location.       SUBJECTIVE:     Chief Complaint:  Follow up after completing treatment    History of Present Illness:   Luis Gomes is a 34 y.o. male is seen today for a follow up after completing treatment.  He is s/p right temporal craniotomy for tumor excision by Dr. Serna on 10/15/2024.      Prior to surgery the patient reported he coughed, strained, or leaned over, he would experience pressure inside his head, ringing in both ears, and dizziness.  He also complained of blurry vision in both eyes. He had numbness in the right temporal region by his right eye. He experienced paresthesia of the lips and inside the cheeks of his mouth with slurred speech.  During his seizure activity, patient would zoned off and would not make any sense when he smoked.  He had unstable gait and had multiple falls.  He complained of having incontinent every other day when he wakes up in the morning.     Patient is accompanied by his mother and hospice nurse on this telemedicine visit today. The patient returns today reporting his symptoms are unchanged since the last visit in neurosurgery clinic on 11/1/2024. He is complaining of some pain around the surgical incision site. He has to take tylenol and ibuprofen the past few days. He is rating his pain 5/10 on a pain scale today. He experiences dizziness when going from sitting to standing position. He still has  intermittent word finding difficulty. He denies of any seizure activity. Patient was recently hospitalized after experiencing shortness of breath and was found to have a saddle pulmonary embolism on 2025 and discharged home on 2025. At that hospital visit, patient decided to change his code status to DNR and go home on hospice. He denies any new onset of weakness or numbness.     Past Medical History:   Diagnosis Date    Anxiety     Bipolar disorder 2022    Brain mass     Depression     Fatty liver     High blood sugar     Insomnia     Posttraumatic stress disorder 2019    Seizures     Sleep apnea         Past Surgical History:   Procedure Laterality Date    CRANIOTOMY, WITH NEOPLASM EXCISION USING COMPUTER-ASSISTED NAVIGATION Right 10/15/2024    Procedure: CRANIOTOMY, WITH NEOPLASM EXCISION USING COMPUTER-ASSISTED NAVIGATION;  Surgeon: Cole Smith MD;  Location: [facility] OR;  Service: Neurosurgery;  Laterality: Right;  right temporal crani for excision of tumor  Stealth // ULTRASOUND // TIVA SETUP  NTI (meps, sseps, eeg, surbcortical stim - need grid)        Social History     Tobacco Use    Smoking status: Every Day     Current packs/day: 0.00     Types: Vaping with nicotine, Cigarettes     Last attempt to quit: 10/2024     Years since quittin.3    Smokeless tobacco: Never   Substance Use Topics    Alcohol use: Yes     Comment: beer occ        Family History   Problem Relation Name Age of Onset    Mental illness Mother      Mental illness Father      Heart disease Father          Review of patient's allergies indicates:   Allergen Reactions    Tree nut         Current Outpatient Medications   Medication Instructions    albuterol (PROVENTIL/VENTOLIN HFA) 90 mcg/actuation inhaler inhale TWO puffs EVERY 4 TO 6 HOURS AS NEEDED SHORTNESS OF BREATH wheezing    aluminum & magnesium hydroxide-simethicone (MYLANTA MAX STRENGTH) 400-400-40 mg/5 mL suspension 5 mLs, Oral, 4 times daily PRN     apixaban (ELIQUIS) 5 mg, Oral, 2 times daily, Take 2 tabs bid x 7 days then 1 tab bid    busPIRone (BUSPAR) 15 MG tablet 1 tablet, 3 times daily    diphenhydrAMINE (BENADRYL) 12.5 mg, Oral, 4 times daily PRN    DULoxetine (CYMBALTA) 30 MG capsule TAKE ONE CAPSULE BY MOUTH EVERY DAY with 60mg    DULoxetine (CYMBALTA) 60 mg, Daily    guanFACINE (INTUNIV ER) 2 mg Tb24 1 tablet, Daily    HYDROcodone-acetaminophen (NORCO) 5-325 mg per tablet 1 tablet, Every 4 hours PRN    hydrOXYzine pamoate (VISTARIL) 50 mg, Every 8 hours PRN    ibuprofen (ADVIL,MOTRIN) 800 mg, 3 times daily    insulin glargine U-100 (Lantus) 20 Units, Daily    JARDIANCE 25 mg tablet 1 tablet, Daily    levETIRAcetam (KEPPRA) 1,500 mg, Oral, 2 times daily    LIDOcaine viscous HCl 2% (LIDOCAINE VISCOUS) 2 % Soln Mucous Membrane, 4 times daily PRN, Swish and spit 20 ml (5 ml benadryl, 5 ml lidocaine, 5 ml mylanta, 5 ml nystatin) four times daily as needed for mouth sores    LINZESS 72 mcg Cap capsule Take 1 capsule every day by oral route for 30 days.    lisdexamfetamine (VYVANSE) 50 MG capsule TAKE ONE CAPSULE BY MOUTH ONCE EVERY DAY    lisinopriL-hydrochlorothiazide (PRINZIDE,ZESTORETIC) 20-12.5 mg per tablet 1 tablet, Daily    lurasidone (LATUDA) 40 mg, Daily    metFORMIN (GLUCOPHAGE) 850 mg, Oral, 2 times daily with meals    MYRBETRIQ 25 mg, Daily    nebivoloL (BYSTOLIC) 5 MG Tab 1 tablet, Daily    nystatin (MYCOSTATIN) 100,000 unit/mL suspension Take 5 mL 4 times a day by oral route, for thrush.    ondansetron (ZOFRAN-ODT) 8 mg, Oral, Every 6 hours PRN    QUEtiapine (SEROQUEL) 200 mg, Nightly    TRUE METRIX GLUCOSE METER Misc USE TO check glucose BEFORE meals AND AT BEDTIME    TRUE METRIX GLUCOSE TEST STRIP Strp 4 times daily    TRUEPLUS LANCETS 30 gauge Misc USE TO check glucose BEFORE meals AND AT BEDTIME    XANAX 0.5 mg, Oral, 3 times daily PRN          Review of Systems   Constitutional: Negative.    HENT: Negative.     Eyes: Negative.   "  Respiratory: Negative.     Cardiovascular: Negative.    Gastrointestinal: Negative.    Genitourinary: Negative.    Musculoskeletal: Negative.    Skin: Negative.    Neurological:  Positive for dizziness, speech change and headaches. Negative for seizures.   Endo/Heme/Allergies: Negative.    Psychiatric/Behavioral: Negative.         OBJECTIVE:       Visit Vitals  Ht 5' 11" (1.803 m)   Wt (!) 174.6 kg (385 lb)   BMI 53.70 kg/m²            Physical Exam    Telemedicine visit. Limited physical examination    Neuro:  Awake, alert, and oriented to all 4 spheres.  Speech is clear.    Imaging:  All pertinent neuroimaging independently reviewed. Discussed these findings in detail with the patient.    MRI brain w wo contrast on 1/15/2025 showed a slight increase in size of the right temporal lobe. Other tumors appears to be stable.     ASSESSMENT:       ICD-10-CM ICD-9-CM   1. Astrocytoma brain tumor  C71.9 191.9        PLAN:     Luis Gomes returns today for a follow up visit after completing his treatment. Patient is declining any further treatments. He has decided to go hospice. We recommend to the hospice nurse to increase his decadron intake. He will come back to our office as needed. He verbalized understanding. He knows he can reach out to our office with any questions or concerns.        Follow up if symptoms worsen or fail to improve.      E/M Level Based On Time:   10 minutes spent on reviewing chart, which includes interpreting lab results and diagnostic tests.   20 minutes spent with the patient performing a history and physical exam, counseling or educating the patient/caregiver, prescribing medications, ordering labwork/diagnostic tests, or placing referrals.   5 minutes spent collaborating plan of care with physician.   10 minutes spent documenting all relevant clinical informationin the electronic health record.     Total Time Spent: 45 minutes           Azul Del Tor[facility]  Neurosurgery  [facility]    Disclaimer:  This note is prepared using voice recognition software and as such is likely to have errors despite attempts at proofreading. Please contact me for questions.

## 2025-02-06 ENCOUNTER — DOCUMENT SCAN (OUTPATIENT)
Dept: ADMINISTRATIVE | Facility: HOSPITAL | Age: 35
End: 2025-02-06
Payer: MEDICAID

## (undated) DEVICE — STAPLER SKIN PROXIMATE WIDE

## (undated) DEVICE — SPONGE SURGIFOAM 100 8.5X12X10

## (undated) DEVICE — COTTON BALL SILK SMALL .5IN

## (undated) DEVICE — DISH PETRI MED 3.5IN

## (undated) DEVICE — PERFORATOR SURG CRAN 14X11MM

## (undated) DEVICE — TRAY CATH FOL SIL URIMTR 16FR

## (undated) DEVICE — DRAPE BAG ISOLATION 20 X 20

## (undated) DEVICE — HOOK LONE STAR RETRCT 12MM

## (undated) DEVICE — SUT VICRYL 2-0 8-18 CP-2

## (undated) DEVICE — BANDAGE BULKEE LITE 3INX4.1YD

## (undated) DEVICE — GLOVE PROTEXIS BLUE LATEX 7.5

## (undated) DEVICE — ROUTER TAPERED 2.3MM

## (undated) DEVICE — GLOVE PROTEXIS PI SYN SURG 7

## (undated) DEVICE — SPHERE NDI PASSIVE

## (undated) DEVICE — ELECTRODE PATIENT RETURN DISP

## (undated) DEVICE — GLOVE PROTEXIS PI SYN SURG 7.5

## (undated) DEVICE — POSITIONER HEAD ADULT

## (undated) DEVICE — HEMOSTAT SURGICEL FIBRLR 2X4IN

## (undated) DEVICE — SOL NACL IRR 1000ML BTL

## (undated) DEVICE — GOWN X-LG STERILE BACK

## (undated) DEVICE — GLOVE PROTEXIS BLUE LATEX 8

## (undated) DEVICE — Device

## (undated) DEVICE — TUBING SILICON CLR 3/16IN 10FT

## (undated) DEVICE — BAND RUBBER STERILE 1/4X3.5IN

## (undated) DEVICE — TUBING IRR BIPOLAR CORD 12FT

## (undated) DEVICE — DRAPE CRANIOTOMY T SURG STRL

## (undated) DEVICE — TUBE SUCTION MEDI-VAC STERILE

## (undated) DEVICE — DRESSING SURGICAL 1X3

## (undated) DEVICE — DRESSING SURGICAL 3/4X3/4

## (undated) DEVICE — SPONGE PATTY SURGICAL .5X3IN

## (undated) DEVICE — CASSETTE SONOPET IQ IRRIGATION

## (undated) DEVICE — ROUTER STRAIGHT RED 1.1MM

## (undated) DEVICE — POSITIONER HEEL FOAM CONVOLTD

## (undated) DEVICE — KIT SURGIFLO HEMOSTATIC MATRIX

## (undated) DEVICE — ZOR AXS BATTERY PACK

## (undated) DEVICE — APPLICATOR CHLORAPREP ORN 26ML

## (undated) DEVICE — SUT 4/0 18IN NUROLON BLK B

## (undated) DEVICE — DRESSING TELFA N ADH 3X8

## (undated) DEVICE — BANDAGE GAUZE COT STRL 4.5X4.1

## (undated) DEVICE — CONTAINER SPECIMEN OR STER 4OZ

## (undated) DEVICE — PROBE SIMULATOR KRAFF

## (undated) DEVICE — MARKER WRITESITE SKIN CHLRAPRP

## (undated) DEVICE — DRESSING XEROFORM NONADH 1X8IN

## (undated) DEVICE — ELECTRODE BLADE INSULATED 1 IN

## (undated) DEVICE — COVER HD BACK TABLE 6FT

## (undated) DEVICE — TIP SONAPET IQ STANDARD 12CM

## (undated) DEVICE — NDL HYPO 22GX1 1/2 SYR 10ML LL

## (undated) DEVICE — SPONGE COTTON TRAY 4X4IN

## (undated) DEVICE — SOL .9NACL PF 100 ML

## (undated) DEVICE — DRAPE OPMI STERILE

## (undated) DEVICE — CLIPS RANEY SCALP FFS/ASSY

## (undated) DEVICE — KIT SURGICAL TURNOVER